# Patient Record
Sex: MALE | Race: WHITE | Employment: OTHER | URBAN - NONMETROPOLITAN AREA
[De-identification: names, ages, dates, MRNs, and addresses within clinical notes are randomized per-mention and may not be internally consistent; named-entity substitution may affect disease eponyms.]

---

## 2019-06-04 LAB
AVERAGE GLUCOSE: NORMAL
HBA1C MFR BLD: 7.7 %

## 2019-07-12 LAB — DIABETIC RETINOPATHY: NEGATIVE

## 2019-12-06 LAB
ALBUMIN SERPL-MCNC: NORMAL G/DL
ALP BLD-CCNC: NORMAL U/L
ALT SERPL-CCNC: NORMAL U/L
ANION GAP SERPL CALCULATED.3IONS-SCNC: NORMAL MMOL/L
AST SERPL-CCNC: NORMAL U/L
AVERAGE GLUCOSE: NORMAL
BASOPHILS ABSOLUTE: NORMAL
BASOPHILS RELATIVE PERCENT: NORMAL
BILIRUB SERPL-MCNC: NORMAL MG/DL
BUN BLDV-MCNC: NORMAL MG/DL
CALCIUM SERPL-MCNC: NORMAL MG/DL
CHLORIDE BLD-SCNC: NORMAL MMOL/L
CHOLESTEROL, TOTAL: NORMAL
CHOLESTEROL/HDL RATIO: NORMAL
CO2: NORMAL
CREAT SERPL-MCNC: NORMAL MG/DL
EOSINOPHILS ABSOLUTE: NORMAL
EOSINOPHILS RELATIVE PERCENT: NORMAL
GFR CALCULATED: NORMAL
GLUCOSE BLD-MCNC: NORMAL MG/DL
HBA1C MFR BLD: 8 %
HCT VFR BLD CALC: NORMAL %
HDLC SERPL-MCNC: NORMAL MG/DL
HEMOGLOBIN: NORMAL
LDL CHOLESTEROL CALCULATED: NORMAL
LYMPHOCYTES ABSOLUTE: NORMAL
LYMPHOCYTES RELATIVE PERCENT: NORMAL
MCH RBC QN AUTO: NORMAL PG
MCHC RBC AUTO-ENTMCNC: NORMAL G/DL
MCV RBC AUTO: NORMAL FL
MONOCYTES ABSOLUTE: NORMAL
MONOCYTES RELATIVE PERCENT: NORMAL
NEUTROPHILS ABSOLUTE: NORMAL
NEUTROPHILS RELATIVE PERCENT: NORMAL
PLATELET # BLD: NORMAL 10*3/UL
PMV BLD AUTO: NORMAL FL
POTASSIUM SERPL-SCNC: NORMAL MMOL/L
RBC # BLD: NORMAL 10*6/UL
SODIUM BLD-SCNC: NORMAL MMOL/L
TOTAL PROTEIN: NORMAL
TRIGL SERPL-MCNC: NORMAL MG/DL
VLDLC SERPL CALC-MCNC: NORMAL MG/DL
WBC # BLD: NORMAL 10*3/UL

## 2020-02-14 VITALS
HEIGHT: 69 IN | TEMPERATURE: 98.2 F | SYSTOLIC BLOOD PRESSURE: 145 MMHG | HEART RATE: 72 BPM | BODY MASS INDEX: 38.95 KG/M2 | DIASTOLIC BLOOD PRESSURE: 72 MMHG | WEIGHT: 263 LBS

## 2020-02-14 RX ORDER — LANCETS
1 EACH MISCELLANEOUS 4 TIMES DAILY
Status: ON HOLD | COMMUNITY
End: 2021-06-19 | Stop reason: HOSPADM

## 2020-02-14 RX ORDER — LISINOPRIL 20 MG/1
20 TABLET ORAL
COMMUNITY
End: 2020-12-11 | Stop reason: CLARIF

## 2020-02-14 RX ORDER — OMEPRAZOLE 20 MG/1
20 CAPSULE, DELAYED RELEASE ORAL DAILY
COMMUNITY
End: 2020-10-26 | Stop reason: CLARIF

## 2020-02-14 RX ORDER — MULTIVIT WITH MINERALS/LUTEIN
1000 TABLET ORAL DAILY
COMMUNITY

## 2020-02-14 RX ORDER — CALCIUM CARBONATE/VITAMIN D3 500-10/5ML
2 LIQUID (ML) ORAL 2 TIMES DAILY
COMMUNITY
End: 2021-06-24 | Stop reason: DRUGHIGH

## 2020-02-14 RX ORDER — HYDROCHLOROTHIAZIDE 25 MG/1
50 TABLET ORAL DAILY
COMMUNITY
End: 2020-09-22

## 2020-02-14 RX ORDER — NITROGLYCERIN 0.4 MG/1
0.4 TABLET SUBLINGUAL EVERY 5 MIN PRN
COMMUNITY
End: 2020-08-06 | Stop reason: SDUPTHER

## 2020-02-14 RX ORDER — LANOLIN ALCOHOL/MO/W.PET/CERES
500 CREAM (GRAM) TOPICAL 2 TIMES DAILY
COMMUNITY

## 2020-02-14 RX ORDER — ACETAMINOPHEN 160 MG
2000 TABLET,DISINTEGRATING ORAL DAILY
COMMUNITY

## 2020-02-14 RX ORDER — CALCIUM POLYCARBOPHIL 625 MG 625 MG/1
625 TABLET ORAL 2 TIMES DAILY
COMMUNITY
End: 2021-06-24

## 2020-02-14 RX ORDER — ISOSORBIDE MONONITRATE 60 MG/1
60 TABLET, EXTENDED RELEASE ORAL 2 TIMES DAILY
COMMUNITY
End: 2021-03-29 | Stop reason: SDUPTHER

## 2020-02-14 RX ORDER — RANOLAZINE 1000 MG/1
1000 TABLET, EXTENDED RELEASE ORAL 2 TIMES DAILY
COMMUNITY
End: 2020-10-19 | Stop reason: SDUPTHER

## 2020-02-14 RX ORDER — CLONIDINE HYDROCHLORIDE 0.1 MG/1
0.1 TABLET ORAL 2 TIMES DAILY
COMMUNITY
End: 2020-12-11 | Stop reason: CLARIF

## 2020-02-14 RX ORDER — LORATADINE 10 MG/1
10 TABLET ORAL DAILY
COMMUNITY
End: 2020-08-06 | Stop reason: SDUPTHER

## 2020-02-14 RX ORDER — ROSUVASTATIN CALCIUM 10 MG/1
10 TABLET, COATED ORAL DAILY
Status: ON HOLD | COMMUNITY
End: 2021-06-19 | Stop reason: SDUPTHER

## 2020-02-14 RX ORDER — CARVEDILOL 25 MG/1
25 TABLET ORAL 2 TIMES DAILY WITH MEALS
COMMUNITY
End: 2020-08-11 | Stop reason: SDUPTHER

## 2020-02-14 RX ORDER — CHLORAL HYDRATE 500 MG
1000 CAPSULE ORAL
COMMUNITY
End: 2021-06-24

## 2020-02-14 RX ORDER — ZINC SULFATE 50(220)MG
50 CAPSULE ORAL DAILY
COMMUNITY

## 2020-03-11 VITALS
HEIGHT: 69 IN | HEART RATE: 70 BPM | SYSTOLIC BLOOD PRESSURE: 115 MMHG | WEIGHT: 237.06 LBS | BODY MASS INDEX: 35.11 KG/M2 | DIASTOLIC BLOOD PRESSURE: 70 MMHG | TEMPERATURE: 97.7 F

## 2020-03-13 LAB — PROSTATE SPECIFIC ANTIGEN: NORMAL

## 2020-04-14 VITALS
BODY MASS INDEX: 35.31 KG/M2 | SYSTOLIC BLOOD PRESSURE: 142 MMHG | DIASTOLIC BLOOD PRESSURE: 85 MMHG | WEIGHT: 233 LBS | HEIGHT: 68 IN

## 2020-07-28 ENCOUNTER — TELEPHONE (OUTPATIENT)
Dept: FAMILY MEDICINE CLINIC | Age: 75
End: 2020-07-28

## 2020-07-28 NOTE — TELEPHONE ENCOUNTER
Spoke with patient informed him that we can no longer perform BW on orders from other physicians outside our practice. There is an order here from [facility] so he will need to pick it up and take to either [facility] or [address] [place] [address]. He stated can discard he has routine BW scheduled here that rosemary Hernandez ordered and will take a copy of it to .

## 2020-07-31 ENCOUNTER — HOSPITAL ENCOUNTER (OUTPATIENT)
Age: 75
Discharge: HOME | End: 2020-08-02
Payer: MEDICARE

## 2020-07-31 ENCOUNTER — NURSE ONLY (OUTPATIENT)
Dept: FAMILY MEDICINE CLINIC | Age: 75
End: 2020-07-31
Payer: MEDICARE

## 2020-07-31 VITALS — BODY MASS INDEX: 35.46 KG/M2 | WEIGHT: 234 LBS | TEMPERATURE: 97.8 F | HEIGHT: 68 IN

## 2020-07-31 LAB
ALBUMIN SERPL-MCNC: 4 G/DL (ref 3.5–5.2)
ALP BLD-CCNC: 80 U/L (ref 40–129)
ALT SERPL-CCNC: 17 U/L (ref 0–40)
ANION GAP SERPL CALCULATED.3IONS-SCNC: 15 MMOL/L (ref 7–16)
AST SERPL-CCNC: 21 U/L (ref 0–39)
BASOPHILS ABSOLUTE: 0.04 E9/L (ref 0–0.2)
BASOPHILS RELATIVE PERCENT: 0.5 % (ref 0–2)
BILIRUB SERPL-MCNC: 0.6 MG/DL (ref 0–1.2)
BUN BLDV-MCNC: 20 MG/DL (ref 8–23)
CALCIUM SERPL-MCNC: 9.3 MG/DL (ref 8.6–10.2)
CHLORIDE BLD-SCNC: 100 MMOL/L (ref 98–107)
CHOLESTEROL, FASTING: 165 MG/DL (ref 0–199)
CO2: 25 MMOL/L (ref 22–29)
CREAT SERPL-MCNC: 1.2 MG/DL (ref 0.7–1.2)
CREATININE URINE POCT: NORMAL
EOSINOPHILS ABSOLUTE: 0.41 E9/L (ref 0.05–0.5)
EOSINOPHILS RELATIVE PERCENT: 5.2 % (ref 0–6)
GFR AFRICAN AMERICAN: >60
GFR NON-AFRICAN AMERICAN: 59 ML/MIN/1.73
GLUCOSE BLD-MCNC: 116 MG/DL (ref 74–99)
HBA1C MFR BLD: 6.8 % (ref 4–5.6)
HCT VFR BLD CALC: 46.8 % (ref 37–54)
HDLC SERPL-MCNC: 38 MG/DL
HEMOGLOBIN: 14.8 G/DL (ref 12.5–16.5)
IMMATURE GRANULOCYTES #: 0.01 E9/L
IMMATURE GRANULOCYTES %: 0.1 % (ref 0–5)
LDL CHOLESTEROL CALCULATED: 88 MG/DL (ref 0–99)
LYMPHOCYTES ABSOLUTE: 1.85 E9/L (ref 1.5–4)
LYMPHOCYTES RELATIVE PERCENT: 23.5 % (ref 20–42)
MCH RBC QN AUTO: 28.7 PG (ref 26–35)
MCHC RBC AUTO-ENTMCNC: 31.6 % (ref 32–34.5)
MCV RBC AUTO: 90.9 FL (ref 80–99.9)
MICROALBUMIN/CREAT 24H UR: NORMAL MG/G{CREAT}
MICROALBUMIN/CREAT UR-RTO: NORMAL
MONOCYTES ABSOLUTE: 0.57 E9/L (ref 0.1–0.95)
MONOCYTES RELATIVE PERCENT: 7.3 % (ref 2–12)
NEUTROPHILS ABSOLUTE: 4.98 E9/L (ref 1.8–7.3)
NEUTROPHILS RELATIVE PERCENT: 63.4 % (ref 43–80)
PDW BLD-RTO: 13.2 FL (ref 11.5–15)
PLATELET # BLD: 147 E9/L (ref 130–450)
PMV BLD AUTO: 12.5 FL (ref 7–12)
POTASSIUM SERPL-SCNC: 4.6 MMOL/L (ref 3.5–5)
RBC # BLD: 5.15 E12/L (ref 3.8–5.8)
SODIUM BLD-SCNC: 140 MMOL/L (ref 132–146)
T4 FREE: 1.05 NG/DL (ref 0.93–1.7)
TOTAL PROTEIN: 7 G/DL (ref 6.4–8.3)
TRIGLYCERIDE, FASTING: 194 MG/DL (ref 0–149)
TSH SERPL DL<=0.05 MIU/L-ACNC: 5.05 UIU/ML (ref 0.27–4.2)
VLDLC SERPL CALC-MCNC: 39 MG/DL
WBC # BLD: 7.9 E9/L (ref 4.5–11.5)

## 2020-07-31 PROCEDURE — 82044 UR ALBUMIN SEMIQUANTITATIVE: CPT | Performed by: NURSE PRACTITIONER

## 2020-07-31 PROCEDURE — 80061 LIPID PANEL: CPT

## 2020-07-31 PROCEDURE — 80053 COMPREHEN METABOLIC PANEL: CPT

## 2020-07-31 PROCEDURE — 84443 ASSAY THYROID STIM HORMONE: CPT

## 2020-07-31 PROCEDURE — 83036 HEMOGLOBIN GLYCOSYLATED A1C: CPT

## 2020-07-31 PROCEDURE — 84439 ASSAY OF FREE THYROXINE: CPT

## 2020-07-31 PROCEDURE — 85025 COMPLETE CBC W/AUTO DIFF WBC: CPT

## 2020-07-31 PROCEDURE — 36415 COLL VENOUS BLD VENIPUNCTURE: CPT | Performed by: NURSE PRACTITIONER

## 2020-08-06 ENCOUNTER — OFFICE VISIT (OUTPATIENT)
Dept: FAMILY MEDICINE CLINIC | Age: 75
End: 2020-08-06
Payer: MEDICARE

## 2020-08-06 VITALS
HEART RATE: 61 BPM | TEMPERATURE: 97.3 F | HEIGHT: 69 IN | BODY MASS INDEX: 35.92 KG/M2 | DIASTOLIC BLOOD PRESSURE: 78 MMHG | WEIGHT: 242.5 LBS | OXYGEN SATURATION: 95 % | SYSTOLIC BLOOD PRESSURE: 132 MMHG

## 2020-08-06 PROBLEM — E66.01 MORBIDLY OBESE (HCC): Status: ACTIVE | Noted: 2020-08-06

## 2020-08-06 PROBLEM — E21.3 HYPERPARATHYROIDISM (HCC): Status: ACTIVE | Noted: 2020-08-06

## 2020-08-06 PROBLEM — Z98.890 HISTORY OF KNEE SURGERY: Status: ACTIVE | Noted: 2020-08-06

## 2020-08-06 PROBLEM — N18.9 ANEMIA IN CHRONIC KIDNEY DISEASE: Status: ACTIVE | Noted: 2020-07-15

## 2020-08-06 PROBLEM — D63.1 ANEMIA IN CHRONIC KIDNEY DISEASE: Status: ACTIVE | Noted: 2020-07-15

## 2020-08-06 PROBLEM — R33.8 POSTOPERATIVE RETENTION OF URINE: Status: ACTIVE | Noted: 2018-06-07

## 2020-08-06 PROBLEM — R31.9 HEMATURIA: Status: ACTIVE | Noted: 2020-08-06

## 2020-08-06 PROBLEM — I10 BENIGN HYPERTENSION: Status: ACTIVE | Noted: 2019-07-11

## 2020-08-06 PROBLEM — I12.9 MALIGNANT HYPERTENSIVE KIDNEY DISEASE WITH CHRONIC KIDNEY DISEASE STAGE I THROUGH STAGE IV, OR UNSPECIFIED: Status: ACTIVE | Noted: 2020-07-15

## 2020-08-06 PROBLEM — R22.1 MASS OF THYROID REGION: Status: ACTIVE | Noted: 2017-09-08

## 2020-08-06 PROBLEM — Z96.651 STATUS POST RIGHT UNICOMPARTMENTAL KNEE REPLACEMENT: Status: ACTIVE | Noted: 2020-08-06

## 2020-08-06 PROBLEM — I65.29 STENOSIS OF CAROTID ARTERY: Status: ACTIVE | Noted: 2017-05-25

## 2020-08-06 PROBLEM — R33.8 ACUTE RETENTION OF URINE: Status: ACTIVE | Noted: 2020-08-06

## 2020-08-06 PROBLEM — N18.30 CHRONIC KIDNEY DISEASE, STAGE III (MODERATE) (HCC): Status: ACTIVE | Noted: 2020-07-15

## 2020-08-06 PROBLEM — I25.10 CORONARY ATHEROSCLEROSIS: Status: ACTIVE | Noted: 2020-08-06

## 2020-08-06 PROBLEM — E11.22 TYPE 2 DIABETES MELLITUS WITH CHRONIC KIDNEY DISEASE (HCC): Status: ACTIVE | Noted: 2020-07-15

## 2020-08-06 PROBLEM — N99.89 POSTOPERATIVE RETENTION OF URINE: Status: ACTIVE | Noted: 2018-06-07

## 2020-08-06 PROBLEM — E11.00 DIABETES MELLITUS WITH HYPEROSMOLARITY (HCC): Status: ACTIVE | Noted: 2020-07-15

## 2020-08-06 PROBLEM — E87.5 HYPERKALEMIA: Status: ACTIVE | Noted: 2020-07-15

## 2020-08-06 PROBLEM — Z96.651 PRESENCE OF ARTIFICIAL KNEE JOINT, RIGHT: Status: ACTIVE | Noted: 2020-08-06

## 2020-08-06 PROBLEM — C61 MALIGNANT NEOPLASM OF PROSTATE (HCC): Status: ACTIVE | Noted: 2017-02-16

## 2020-08-06 PROBLEM — K21.9 GASTROESOPHAGEAL REFLUX DISEASE: Status: ACTIVE | Noted: 2020-08-06

## 2020-08-06 PROBLEM — E04.2 NON-TOXIC MULTINODULAR GOITER: Status: ACTIVE | Noted: 2018-06-07

## 2020-08-06 PROBLEM — E78.5 HYPERLIPIDEMIA: Status: ACTIVE | Noted: 2018-06-07

## 2020-08-06 PROCEDURE — 99214 OFFICE O/P EST MOD 30 MIN: CPT | Performed by: NURSE PRACTITIONER

## 2020-08-06 PROCEDURE — 3288F FALL RISK ASSESSMENT DOCD: CPT | Performed by: NURSE PRACTITIONER

## 2020-08-06 PROCEDURE — G8510 SCR DEP NEG, NO PLAN REQD: HCPCS | Performed by: NURSE PRACTITIONER

## 2020-08-06 RX ORDER — LORATADINE 10 MG/1
10 TABLET ORAL DAILY
Qty: 30 TABLET | Refills: 5 | Status: SHIPPED | OUTPATIENT
Start: 2020-08-06 | End: 2020-09-05

## 2020-08-06 RX ORDER — LEVOTHYROXINE SODIUM 0.03 MG/1
25 TABLET ORAL DAILY
COMMUNITY
End: 2020-12-16

## 2020-08-06 RX ORDER — NITROGLYCERIN 0.4 MG/1
0.4 TABLET SUBLINGUAL EVERY 5 MIN PRN
Qty: 25 TABLET | Refills: 0 | Status: SHIPPED | OUTPATIENT
Start: 2020-08-06

## 2020-08-06 SDOH — HEALTH STABILITY: MENTAL HEALTH: HOW OFTEN DO YOU HAVE A DRINK CONTAINING ALCOHOL?: NEVER

## 2020-08-06 ASSESSMENT — ENCOUNTER SYMPTOMS
CONSTIPATION: 0
SHORTNESS OF BREATH: 0
VOICE CHANGE: 0
BLOOD IN STOOL: 0
CHEST TIGHTNESS: 0
DIARRHEA: 0
BACK PAIN: 0
VOMITING: 0
COUGH: 0
TROUBLE SWALLOWING: 0
ABDOMINAL PAIN: 0
WHEEZING: 0
NAUSEA: 0

## 2020-08-06 ASSESSMENT — PATIENT HEALTH QUESTIONNAIRE - PHQ9
SUM OF ALL RESPONSES TO PHQ QUESTIONS 1-9: 0
SUM OF ALL RESPONSES TO PHQ QUESTIONS 1-9: 0
1. LITTLE INTEREST OR PLEASURE IN DOING THINGS: 0
2. FEELING DOWN, DEPRESSED OR HOPELESS: 0
SUM OF ALL RESPONSES TO PHQ9 QUESTIONS 1 & 2: 0

## 2020-08-06 NOTE — PROGRESS NOTES
Respiratory: Negative for cough, chest tightness, shortness of breath and wheezing. Cardiovascular: Negative for chest pain, palpitations and leg swelling. Gastrointestinal: Negative for abdominal pain, blood in stool, constipation, diarrhea, nausea and vomiting. Endocrine: Negative for polydipsia, polyphagia and polyuria. Genitourinary: Negative for dysuria, enuresis, frequency and hematuria. Musculoskeletal: Negative for arthralgias, back pain, gait problem, joint swelling, myalgias and neck stiffness. Skin: Negative for rash. Neurological: Negative for dizziness, seizures, syncope, facial asymmetry, weakness, light-headedness, numbness and headaches. Hematological: Does not bruise/bleed easily. Psychiatric/Behavioral: Negative for behavioral problems, confusion, hallucinations and suicidal ideas. The patient is not nervous/anxious. Current Outpatient Medications:     Insulin Degludec 200 UNIT/ML SOPN, Inject 55 Units into the skin daily AM, Disp: , Rfl:     insulin lispro (HUMALOG) 100 UNIT/ML injection vial, Inject 1 Units into the skin 3 times daily (before meals) Sliding Scale, Disp: , Rfl:     canagliflozin (INVOKANA) 100 MG TABS tablet, Take 100 mg by mouth every morning (before breakfast), Disp: , Rfl:     levothyroxine (SYNTHROID) 25 MCG tablet, Take 25 mcg by mouth Daily, Disp: , Rfl:     polycarbophil (FIBERCON) 625 MG tablet, Take 625 mg by mouth 2 times daily, Disp: , Rfl:     isosorbide mononitrate (IMDUR) 60 MG extended release tablet, Take 60 mg by mouth 2 times daily, Disp: , Rfl:     nitroGLYCERIN (NITROSTAT) 0.4 MG SL tablet, Place 0.4 mg under the tongue every 5 minutes as needed for Chest pain up to max of 3 total doses.  If no relief after 1 dose, call 911., Disp: , Rfl:     Cholecalciferol (VITAMIN D3) 50 MCG (2000 UT) CAPS, Take by mouth daily, Disp: , Rfl:     ranolazine (RANEXA) 1000 MG extended release tablet, Take 1,000 mg by mouth 2 times daily , Disp: , Rfl:     Ascorbic Acid (VITAMIN C) 1000 MG tablet, Take 1,000 mg by mouth daily, Disp: , Rfl:     carvedilol (COREG) 25 MG tablet, Take 25 mg by mouth 2 times daily (with meals), Disp: , Rfl:     omeprazole (PRILOSEC) 20 MG delayed release capsule, Take 20 mg by mouth daily, Disp: , Rfl:     zinc sulfate (ZINCATE) 220 (50 Zn) MG capsule, Take 220 mg by mouth daily, Disp: , Rfl:     TURMERIC PO, Take 500 capsules by mouth daily, Disp: , Rfl:     Magnesium Oxide 400 MG CAPS, Take 2 capsules by mouth 2 times daily , Disp: , Rfl:     Microlet Lancets MISC, 1 each by Does not apply route 4 times daily, Disp: , Rfl:     Omega-3 Fatty Acids (FISH OIL) 1000 MG CAPS, Take 1,000 mg by mouth 2 CAPS IN THE AM 1 CAP IN PM , Disp: , Rfl:     B Complex Vitamins (B COMPLEX 1 PO), Take by mouth daily, Disp: , Rfl:     aspirin 81 MG tablet, Take 81 mg by mouth 2 times daily, Disp: , Rfl:     rosuvastatin (CRESTOR) 10 MG tablet, Take 10 mg by mouth daily, Disp: , Rfl:     lisinopril (PRINIVIL;ZESTRIL) 20 MG tablet, Take 20 mg by mouth Take 1 tablet in a.m and 1/2 in p.m., Disp: , Rfl:     cloNIDine (CATAPRES) 0.1 MG tablet, Take 0.1 mg by mouth 2 times daily, Disp: , Rfl:     Garlic 571 MG CAPS, Take by mouth 2 times daily, Disp: , Rfl:     hydrochlorothiazide (HYDRODIURIL) 25 MG tablet, Take 50 mg by mouth daily, Disp: , Rfl:     loratadine (ALLERGY RELIEF) 10 MG tablet, Take 10 mg by mouth daily, Disp: , Rfl:     diclofenac sodium (VOLTAREN) 1 % GEL, Apply 4 g topically 4 times daily, Disp: , Rfl:      No Known Allergies    Past Medical History:   Diagnosis Date    CAD (coronary artery disease)     [facility] mellitus ([facility] [address]     Diverticular disease of colon     DJD (degenerative joint disease), multiple sites     Goiter     H/O thyroid nodule     Hx of malignant neoplasm of prostate     Hyperlipidemia     Hypertension     Hypothyroidism      Past Surgical History:   Procedure Laterality Date    CAROTID ENDARTERECTOMY  2017    CHOLECYSTECTOMY      COLONOSCOPY      CORONARY ARTERY BYPASS GRAFT  10/11/2001    HERNIA REPAIR      SKIN CANCER EXCISION      MELONOMA    THYROID SURGERY  2017    BX    TOTAL KNEE ARTHROPLASTY Left 2018     Family History   Problem Relation Age of Onset    Diabetes Mother     Cancer Mother     Heart Attack Father     Coronary Art Dis Father      Social History     Socioeconomic History    Marital status:      Spouse name: Not on file    Number of children: Not on file    Years of education: Not on file    Highest education level: Not on file   Occupational History    Not on file   Social Needs    Financial resource strain: Not on file    Food insecurity     Worry: Not on file     Inability: Not on file    Transportation needs     Medical: Not on file     Non-medical: Not on file   Tobacco Use    Smoking status: Former Smoker     Last attempt to quit: 1984     Years since quittin.5    Smokeless tobacco: Never Used   Substance and Sexual Activity    Alcohol use: Never     Frequency: Never    Drug use: Never    Sexual activity: Not on file   Lifestyle    Physical activity     Days per week: Not on file     Minutes per session: Not on file    Stress: Not on file   Relationships    Social connections     Talks on phone: Not on file     Gets together: Not on file     Attends [employer] service: Not on file     Active member of club or organization: Not on file     Attends meetings of clubs or organizations: Not on file     Relationship status: Not on file    Intimate partner violence     Fear of current or ex partner: Not on file     Emotionally abused: Not on file     Physically abused: Not on file     Forced sexual activity: Not on file   Other Topics Concern    Not on file   Social History Narrative    Not on file       Vitals:    20 0950   BP: 132/78   Site: Left Upper Arm   Position: Sitting   Cuff Size: Large Adult Pulse: 61   Temp: 97.3 °F (36.3 °C)   TempSrc: Temporal   SpO2: 95%   Weight: 242 lb 8 oz (110 kg)   Height: 5' 9\" (1.753 m)       Physical Exam  Vitals signs and nursing note reviewed. Constitutional:       Appearance: Normal appearance. HENT:      Head: Normocephalic. Right Ear: Tympanic membrane and ear canal normal. There is no impacted cerumen. Left Ear: Tympanic membrane and ear canal normal. There is no impacted cerumen. Nose: Nose normal.      Mouth/Throat:      Mouth: Mucous membranes are dry. Eyes:      Extraocular Movements: Extraocular movements intact. Pupils: Pupils are equal, round, and reactive to light. Neck:      Musculoskeletal: No neck rigidity or muscular tenderness. Vascular: No carotid bruit. Cardiovascular:      Rate and Rhythm: Normal rate and regular rhythm. Pulses: Normal pulses. Heart sounds: Normal heart sounds. No murmur. No friction rub. No gallop. Pulmonary:      Effort: Pulmonary effort is normal. No respiratory distress. Breath sounds: Normal breath sounds. No stridor. No wheezing, rhonchi or rales. Chest:      Chest wall: No tenderness. Abdominal:      General: Bowel sounds are normal. There is no distension. Palpations: Abdomen is soft. Musculoskeletal:         General: No swelling, tenderness, deformity or signs of injury. Right lower leg: No edema. Left lower leg: No edema. Lymphadenopathy:      Cervical: No cervical adenopathy. Skin:     General: Skin is warm and dry. Capillary Refill: Capillary refill takes 2 to 3 seconds. Findings: No bruising, lesion or rash. Neurological:      General: No focal deficit present. Mental Status: He is alert and oriented to person, place, and time. Motor: No weakness.       Gait: Gait normal.   Psychiatric:         Attention and Perception: Attention normal.         Mood and Affect: Mood normal.         Behavior: Behavior normal.         Thought Content: Thought content does not include homicidal or suicidal ideation. Thought content does not include homicidal or suicidal plan. Assessment and Plan:  Glroia Garner was seen today for results, pharyngitis and headache. Diagnoses and all orders for this visit:    Benign hypertension  Comments:  Continue expectant management    Hyperparathyroidism ([address]    Morbidly obese ([address]  Comments:  Diet modifications    Type 2 diabetes mellitus with stage 3 chronic kidney disease, with long-term current use of insulin ([address]  Comments:  AIC 6.8 good on him continue treatment regimen had stopped the [place] but started it again    Stenosis of right carotid artery  Comments:  US in 2019    Non-toxic multinodular goiter    Chronic kidney disease, stage III (moderate) (HCC)    Malignant neoplasm of prostate ([address]  Comments:  Sees Urologist    Hyperkalemia    Benign essential microscopic hematuria    Anemia in stage 3 chronic kidney disease (HCC)    Presence of artificial knee joint, right  Comments:  Did not use the Diclofenac because the PI causes Heart Attacks but I explained that topically the less than 1 % enters the blood stream and he should try it    History of knee surgery        No follow-ups on file. USPTF:    ( 2019 Outside the age ) Colorectal Cancer: Screening --Adults aged 48 to 76 years  Grade: A (Recommended) recommends screening for colorectal cancer starting at age 48 years and continuing until age 76 years. ( Outside the age ) HIV: Screening - Adolescents and Adults  Grade: A (Recommended) recommends that clinicians screen for HIV infection in ages 13 to 72 years. ( 2020 ) High Blood Pressure: Screening and Home Monitoring -- Adults  Grade: A (Recommended) recommends screening for high blood pressure in ages 25 years or older. obtain measurements outside of the clinical setting for diagnostic confirmation before starting treatment.  Annual screening for adults aged 36 years or older or those who are at increased risk for blood pressure    ( 2020 )  Lipid Disorders in Adults: Screening -- Men 28 and Older  Grade: A (Recommended) recommends screening men aged 28 and older for lipid disorders. ( Non drinker ) Alcohol Misuse: Screening and Behavioral Counseling Interventions in Primary Care -- Adults  Grade: B (Recommended) recommends that clinicians screen adults aged 25 years or older for alcohol misuse and provide persons engaged in risky or hazardous drinking with brief behavioral counseling interventions to reduce alcohol misuse. ( AIC 6.8 ) Abnormal Blood Glucose and Type 2 Diabetes Mellitus: Screening -- Adults aged 36 to 79 years who are overweight or obese Grade: B (Recommended)    ( 2019 ) Abdominal Aortic Aneurysm: Screening -- Men Ages 72 to 76 Years Who Have Ever Smoked. Grade: B (Recommended) recommends one-time screening for abdominal aortic aneurysm (AAA) with ultrasonography in men ages 72 to 76 years who have ever smoked. ( Diet controlled )  Obesity: Screening for and Management of-- All Adults  Grade: B (Recommended) recommends screening all adults for obesity. Clinicians should offer or refer patients with a body mass index (BMI) of 30 kg/m2 or higher to intensive, multicomponent behavioral interventions. ( Outside the age ) Hepatitis C Virus Infection: Screening--Adults at High Risk and Adults born between 80 and 1965  Grade: B (Recommended) recommends screening for hepatitis C virus (HCV) infection in persons at high risk for infection. The USPSTF also recommends offering 1-time screening for HCV infection to adults born between 80 and 1965.      ( patient is stiff but steady )  Fall Prevention -- Exercise/Physical Therapy: Community-dwelling Adults 72 Years or Older, Increased Risk for Falls   Grade: B (Recommended) recommends exercise or physical therapy to prevent falls in community-dwelling adults aged 72 years or older who are at increased risk for falls.    ( None reported today )  Depression: Screening -- General adult population, including pregnant and postpartum women  Grade: B (Recommended) recommends screening for depression in the general adult population,  Screening should be implemented with adequate systems in place to ensure accurate diagnosis, effective treatment, and appropriate follow-up. ( 2019 ) Glaucoma: Screening - Adults and Diabetic Eye Exam     ( 2019 ) Vitamin D Deficiency: Screening --       ( 2020 ) Thyroid Dysfunction: Screening --      ( 2020 ) Skin Cancer: Screening --Asymptomatic adults  Grade:  I(Uncertain)     ( 0098) Carotid Artery Stenosis: Screening -- Adults  Grade: D (Not Recommended)     ( 2020 ) Coronary Heart Disease: Screening with Electrocardiography--Adults at Low Risk  Grade: D (Not Recommended)     ( UTD ) Prostate Cancer: Prostate-Specific Antigen (PSA)-Based Screening -- All Men   PSA 2.5 Mar 2020  PSA 3.1 Sep 2019  PSA 2.7 Feb 2018                 Seen By:  VERENICE Garces - CNP none...

## 2020-08-06 NOTE — PATIENT INSTRUCTIONS
Patient Education        diclofenac topical  Pronunciation:  dye ZOE eugene ak TOP ik al  Brand:  DST Plus Ck, Pennsaid, Solaraze, Voltaren Topical  What is the most important information I should know about diclofenac topical?  Diclofenac can increase your risk of fatal heart attack or stroke, especially if you use it long term or take high doses, or if you have heart disease. Do not use this medicine just before or after heart bypass surgery (coronary artery bypass graft, or CABG). Diclofenac may also cause stomach or intestinal bleeding, which can be fatal. These conditions can occur without warning while you are using diclofenac, especially in older adults. What is diclofenac topical?  Diclofenac is a nonsteroidal anti-inflammatory drug (NSAID). Diclofenac topical (for the skin) is used to treat joint pain caused by osteoarthritis. Pennsaid is for use on the knees. Voltaren Topical is for use on the hands, wrists, elbows, knees, ankles, or feet. This medicine may not be effective in treating arthritis pain elsewhere in the body. Solaraze is used to treat warty overgrowths of skin (actinic keratoses) on sun-exposed areas of the body. Diclofenac topical may also be used for purposes not listed in this medication guide. What should I discuss with my healthcare provider before using diclofenac topical?  Diclofenac can increase your risk of fatal heart attack or stroke, especially if you use it long term or take high doses, or if you have heart disease. Even people without heart disease or risk factors could have a stroke or heart attack while taking this medicine. Do not use this medicine just before or after heart bypass surgery (coronary artery bypass graft, or CABG). Diclofenac may also cause stomach or intestinal bleeding, which can be fatal. These conditions can occur without warning while you are using diclofenac, especially in older adults.   You should not use this medicine if you are allergic to diclofenac (Voltaren, Cataflam, Flector, and others), or if you have ever had an asthma attack or severe allergic reaction after taking aspirin or an NSAID. Diclofenac topical is not approved for use by anyone younger than 25years old. Tell your doctor if you have ever had:  · heart disease, high blood pressure, high cholesterol, diabetes, or if you smoke;  · a heart attack, stroke, or blood clot;  · stomach ulcers, bleeding in your stomach or intestines;  · asthma;  · liver or kidney disease;  · fluid retention. Diclofenac can affect ovulation and it may be harder to get pregnant while you are using this medicine. However, using diclofenac topical during the last 3 months of pregnancy may harm the unborn baby. Tell your doctor if you are pregnant or plan to become pregnant. It may not be safe to breast-feed while using this medicine. Ask your doctor about any risk. How should I use diclofenac topical?  Follow all directions on your prescription label and read all medication guides. Use the lowest dose that is effective in treating your condition. Do not take by mouth. Topical medicine is for use only on the skin. Rinse with water if this medicine gets in your eyes or mouth. Read and carefully follow any Instructions for Use provided with your medicine. Ask your doctor or pharmacist if you do not understand these instructions. Do not apply diclofenac topical to an open skin wound, or on areas of infection, rash, burn, or peeling skin. Store at room temperature away from moisture and heat. Do not freeze. Store Pennsaid in an upright position. What happens if I miss a dose? Apply the medicine as soon as you can, but skip the missed dose if it is almost time for your next dose. Do not apply two doses at one time. What happens if I overdose? Seek emergency medical attention or call the Poison Help line at 1-209.770.7967.   What should I avoid while using diclofenac topical?  Ask a doctor or pharmacist before using other medicines for pain, fever, swelling, or cold/flu symptoms. They may contain ingredients similar to diclofenac (such as aspirin, ibuprofen, ketoprofen, or naproxen). Avoid drinking alcohol. It may increase your risk of stomach bleeding. Avoid exposing treated skin to heat, sunlight, or tanning beds. Heat can increase the amount of diclofenac you absorb through your skin. Avoid getting this medicine in your eyes. If contact does occur, rinse with water. Call your doctor if you have eye irritation that lasts longer than 1 hour. Do not use cosmetics, sunscreen, lotions, insect repellant, or other medicated skin products on the same area you treat with diclofenac topical.  What are the possible side effects of diclofenac topical?  Get emergency medical help if you have signs of an allergic reaction (hives, sneezing, runny or stuffy nose, wheezing or trouble breathing, swelling in your face or throat) or a severe skin reaction (fever, sore throat, burning eyes, skin pain, red or purple skin rash with blistering and peeling). Although the risk of serious side effects is low when diclofenac is applied to the skin, this medicine can be absorbed through the skin, which may cause steroid side effects throughout the body. Stop using diclofenac and seek emergency medical attention if you have signs of a heart attack or stroke: chest pain spreading to your jaw or shoulder, sudden numbness or weakness on one side of the body, slurred speech, feeling short of breath.   Also call your doctor at once if you have:  · the first sign of any skin rash, no matter how mild;  · swelling, rapid weight gain;  · severe headache, blurred vision, pounding in your neck or ears;  · little or no urination;  · liver problems --nausea, diarrhea, stomach pain (upper right side), tiredness, itching, dark urine, gisselle-colored stools, jaundice (yellowing of the skin or eyes);  · low red blood cells (anemia) --pale skin, unusual tiredness, feeling light-headed or short of breath, cold hands and feet; or  · signs of stomach bleeding --bloody or tarry stools, coughing up blood or vomit that looks like coffee grounds. Common side effects may include:  · heartburn, gas, stomach pain, nausea, vomiting;  · diarrhea, constipation;  · headache, dizziness, drowsiness;  · stuffy nose;  · itching, increased sweating;  · increased blood pressure; or  · skin redness, itching, dryness, scaling, or peeling where the medicine was applied. This is not a complete list of side effects and others may occur. Call your doctor for medical advice about side effects. You may report side effects to FDA at 0-234-WTR-2926. What other drugs will affect diclofenac topical?  Ask your doctor before using diclofenac if you take an antidepressant. Taking certain antidepressants with an NSAID may cause you to bruise or bleed easily. Tell your doctor about all your current medicines, especially:  · cyclosporine;  · lithium;  · methotrexate;  · a blood thinner (warfarin, Coumadin, Jantoven);  · heart or blood pressure medication, including a diuretic or \"water pill\"; or  · steroid medicine (prednisone and others). This list is not complete and many other drugs may affect diclofenac. This includes prescription and over-the-counter medicines, vitamins, and herbal products. Not all possible drug interactions are listed here. Where can I get more information? Your pharmacist can provide more information about diclofenac topical.  Remember, keep this and all other medicines out of the reach of children, never share your medicines with others, and use this medication only for the indication prescribed. Every effort has been made to ensure that the information provided by [address]andy [address] is accurate, up-to-date, and complete, but no guarantee is made to that effect. Drug information contained herein may be time sensitive.  [employer] information has been compiled for use by healthcare practitioners and consumers in the [place] and therefore [employer] does not warrant that uses outside of the [place] are appropriate, unless specifically indicated otherwise. [facility] drug information does not endorse drugs, diagnose patients or recommend therapy. [facility] drug information is an informational resource designed to assist licensed healthcare practitioners in caring for their patients and/or to serve consumers viewing this service as a supplement to, and not a substitute for, the expertise, skill, knowledge and judgment of healthcare practitioners. The absence of a warning for a given drug or drug combination in no way should be construed to indicate that the drug or drug combination is safe, effective or appropriate for any given patient. [facility] does not assume any responsibility for any aspect of healthcare administered with the aid of information [facility] provides. The information contained herein is not intended to cover all possible uses, directions, precautions, warnings, drug interactions, allergic reactions, or adverse effects. If you have questions about the drugs you are taking, check with your doctor, nurse or pharmacist.  Copyright 4700-8115 [address]. Version: 10.01. Revision date: 10/5/2018. Care instructions adapted under license by [facility] ([facility]). If you have questions about a medical condition or this instruction, always ask your healthcare professional. [place] [address] any warranty or liability for your use of this information.

## 2020-08-11 RX ORDER — CARVEDILOL 25 MG/1
25 TABLET ORAL 2 TIMES DAILY WITH MEALS
Qty: 180 TABLET | Refills: 1 | Status: SHIPPED
Start: 2020-08-11 | End: 2021-04-21

## 2020-09-22 ENCOUNTER — OFFICE VISIT (OUTPATIENT)
Dept: FAMILY MEDICINE CLINIC | Age: 75
End: 2020-09-22
Payer: MEDICARE

## 2020-09-22 VITALS
RESPIRATION RATE: 20 BRPM | TEMPERATURE: 97.9 F | SYSTOLIC BLOOD PRESSURE: 130 MMHG | BODY MASS INDEX: 35.74 KG/M2 | DIASTOLIC BLOOD PRESSURE: 80 MMHG | HEART RATE: 75 BPM | WEIGHT: 242 LBS | OXYGEN SATURATION: 98 %

## 2020-09-22 PROCEDURE — 99214 OFFICE O/P EST MOD 30 MIN: CPT | Performed by: NURSE PRACTITIONER

## 2020-09-22 RX ORDER — BLOOD SUGAR DIAGNOSTIC
STRIP MISCELLANEOUS
COMMUNITY
Start: 2020-09-08 | End: 2021-03-09 | Stop reason: SDUPTHER

## 2020-09-22 ASSESSMENT — ENCOUNTER SYMPTOMS
ANAL BLEEDING: 0
CHOKING: 0
RECTAL PAIN: 0
ABDOMINAL PAIN: 1
SORE THROAT: 0
VOICE CHANGE: 0
SINUS PRESSURE: 0
STRIDOR: 0
WHEEZING: 0
COUGH: 0
NAUSEA: 0
EYE ITCHING: 0
CHEST TIGHTNESS: 0
RHINORRHEA: 0
APNEA: 0
TROUBLE SWALLOWING: 0
SINUS PAIN: 0
EYE PAIN: 0
COLOR CHANGE: 0
CONSTIPATION: 0
EYE DISCHARGE: 0
BACK PAIN: 0
SHORTNESS OF BREATH: 0
PHOTOPHOBIA: 0
FACIAL SWELLING: 0
VOMITING: 0
EYE REDNESS: 0
ABDOMINAL DISTENTION: 0
DIARRHEA: 1
BLOOD IN STOOL: 0

## 2020-09-22 ASSESSMENT — PATIENT HEALTH QUESTIONNAIRE - PHQ9
2. FEELING DOWN, DEPRESSED OR HOPELESS: 0
SUM OF ALL RESPONSES TO PHQ QUESTIONS 1-9: 0
SUM OF ALL RESPONSES TO PHQ QUESTIONS 1-9: 0
DEPRESSION UNABLE TO ASSESS: PT REFUSES

## 2020-09-22 NOTE — PROGRESS NOTES
20  Micheal [facility] : 1945 Sex: male  Age: 67 y.o. Chief Complaint   Patient presents with    Other     right side pain x 1 month ,       Complains of RUQ pain for about 1 month. No nausea, vomiting. Has loose stools 2-3 times per day. No melena, hematochezia, hemataemesis. No fever chills, or sweats. No known injury or lifting issues. Review of Systems   Constitutional: Negative for activity change, appetite change, chills, diaphoresis, fatigue, fever and unexpected weight change. HENT: Negative for congestion, dental problem, drooling, ear discharge, ear pain, facial swelling, hearing loss, mouth sores, nosebleeds, postnasal drip, rhinorrhea, sinus pressure, sinus pain, sneezing, sore throat, tinnitus, trouble swallowing and voice change. Eyes: Negative for photophobia, pain, discharge, redness, itching and visual disturbance. Respiratory: Negative for apnea, cough, choking, chest tightness, shortness of breath, wheezing and stridor. Cardiovascular: Negative for chest pain, palpitations and leg swelling. Gastrointestinal: Positive for abdominal pain (RUQ) and diarrhea. Negative for abdominal distention, anal bleeding, blood in stool, constipation, nausea, rectal pain and vomiting. Endocrine: Negative for cold intolerance, heat intolerance, polydipsia, polyphagia and polyuria. Genitourinary: Negative for decreased urine volume, difficulty urinating, dysuria, enuresis, flank pain, frequency, genital sores, hematuria and urgency. Musculoskeletal: Negative for arthralgias, back pain, gait problem, joint swelling, myalgias, neck pain and neck stiffness. Skin: Negative for color change, pallor, rash and wound. Allergic/Immunologic: Negative for environmental allergies, food allergies and immunocompromised state. Neurological: Negative for dizziness, tremors, seizures, syncope, facial asymmetry, speech difficulty, weakness, light-headedness, numbness and headaches. Hematological: Negative for adenopathy. Does not bruise/bleed easily. Psychiatric/Behavioral: Negative for agitation, behavioral problems, confusion, decreased concentration, hallucinations, self-injury, sleep disturbance and suicidal ideas. The patient is not nervous/anxious and is not hyperactive. Current Outpatient Medications:     carvedilol (COREG) 25 MG tablet, Take 1 tablet by mouth 2 times daily (with meals), Disp: 180 tablet, Rfl: 1    Insulin Degludec 200 UNIT/ML SOPN, Inject 55 Units into the skin daily AM, Disp: , Rfl:     insulin lispro (HUMALOG) 100 UNIT/ML injection vial, Inject 1 Units into the skin 3 times daily (before meals) Sliding Scale, Disp: , Rfl:     canagliflozin (INVOKANA) 100 MG TABS tablet, Take 100 mg by mouth every morning (before breakfast), Disp: , Rfl:     levothyroxine (SYNTHROID) 25 MCG tablet, Take 25 mcg by mouth Daily, Disp: , Rfl:     nitroGLYCERIN (NITROSTAT) 0.4 MG SL tablet, Place 1 tablet under the tongue every 5 minutes as needed for Chest pain up to max of 3 total doses.  If no relief after 1 dose, call 911., Disp: 25 tablet, Rfl: 0    polycarbophil (FIBERCON) 625 MG tablet, Take 625 mg by mouth 2 times daily, Disp: , Rfl:     cloNIDine (CATAPRES) 0.1 MG tablet, Take 0.1 mg by mouth 2 times daily, Disp: , Rfl:     isosorbide mononitrate (IMDUR) 60 MG extended release tablet, Take 60 mg by mouth 2 times daily, Disp: , Rfl:     Garlic 024 MG CAPS, Take by mouth 2 times daily, Disp: , Rfl:     Cholecalciferol (VITAMIN D3) 50 MCG (2000 UT) CAPS, Take by mouth daily, Disp: , Rfl:     ranolazine (RANEXA) 1000 MG extended release tablet, Take 1,000 mg by mouth 2 times daily , Disp: , Rfl:     Ascorbic Acid (VITAMIN C) 1000 MG tablet, Take 1,000 mg by mouth daily, Disp: , Rfl:     omeprazole (PRILOSEC) 20 MG delayed release capsule, Take 20 mg by mouth daily, Disp: , Rfl:     zinc sulfate (ZINCATE) 220 (50 Zn) MG capsule, Take 220 mg by mouth daily, Disp: , Rfl:     TURMERIC PO, Take 500 capsules by mouth daily, Disp: , Rfl:     Magnesium Oxide 400 MG CAPS, Take 2 capsules by mouth 2 times daily , Disp: , Rfl:     Microlet Lancets MISC, 1 each by Does not apply route 4 times daily, Disp: , Rfl:     Omega-3 Fatty Acids (FISH OIL) 1000 MG CAPS, Take 1,000 mg by mouth 2 CAPS IN THE AM 1 CAP IN PM , Disp: , Rfl:     B Complex Vitamins (B COMPLEX 1 PO), Take by mouth daily, Disp: , Rfl:     aspirin 81 MG tablet, Take 81 mg by mouth 2 times daily, Disp: , Rfl:     rosuvastatin (CRESTOR) 10 MG tablet, Take 10 mg by mouth daily, Disp: , Rfl:     lisinopril (PRINIVIL;ZESTRIL) 20 MG tablet, Take 20 mg by mouth Take 1 tablet in a.m and 1/2 in p.m., Disp: , Rfl:     diclofenac sodium (VOLTAREN) 1 % GEL, Apply 4 g topically 4 times daily, Disp: , Rfl:     ONETOUCH ULTRA strip, USE 1 STRIP TO CHECK GLUCOSE 4 TIMES DAILY, Disp: , Rfl:   No Known Allergies    Past Medical History:   Diagnosis Date    CAD (coronary artery disease)     Diabetes mellitus [address] [address]     Diverticular disease of colon     DJD (degenerative joint disease), multiple sites     Goiter     H/O thyroid nodule     Hx of malignant neoplasm of prostate     Hyperlipidemia     Hypertension     Hypothyroidism      Past Surgical History:   Procedure Laterality Date    CAROTID ENDARTERECTOMY  05/25/2017    CHOLECYSTECTOMY      COLONOSCOPY      CORONARY ARTERY BYPASS GRAFT  10/11/2001    HERNIA REPAIR      SKIN CANCER EXCISION      MELONOMA    THYROID SURGERY  09/01/2017    BX    TOTAL KNEE ARTHROPLASTY Left 12/17/2018     Family History   Problem Relation Age of Onset    Diabetes Mother     Cancer Mother     Heart Attack Father     Coronary Art Dis Father      Social History     Socioeconomic History    Marital status:      Spouse name: Not on file    Number of children: Not on file    Years of education: Not on file    Highest education level: Not on file   Occupational History    Not on file   Social Needs    Financial resource strain: Not on file    Food insecurity     Worry: Not on file     Inability: Not on file    Transportation needs     Medical: Not on file     Non-medical: Not on file   Tobacco Use    Smoking status: Former Smoker     Last attempt to quit: 1984     Years since quittin.7    Smokeless tobacco: Never Used   Substance and Sexual Activity    Alcohol use: Never     Frequency: Never    Drug use: Never    Sexual activity: Not Currently   Lifestyle    Physical activity     Days per week: Not on file     Minutes per session: Not on file    Stress: Not on file   Relationships    Social connections     Talks on phone: Not on file     Gets together: Not on file     Attends [employer] service: Not on file     Active member of club or organization: Not on file     Attends meetings of clubs or organizations: Not on file     Relationship status: Not on file    Intimate partner violence     Fear of current or ex partner: Not on file     Emotionally abused: Not on file     Physically abused: Not on file     Forced sexual activity: Not on file   Other Topics Concern    Not on file   Social History Narrative    Not on file     Patient Active Problem List   Diagnosis    Status post right unicompartmental knee replacement    Mass of thyroid region    Presence of artificial knee joint, right    Anemia in chronic kidney disease    Uncontrolled stage 2 hypertension    Chronic kidney disease, stage III (moderate) ([address]    Hematuria    Hyperkalemia    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, or unspecified    Malignant neoplasm of prostate ([address]    Non-toxic multinodular goiter    Postoperative retention of urine    Stenosis of carotid artery    Diabetes mellitus with hyperosmolarity ([address]    Hyperparathyroidism ([address]    Morbidly obese ([address]    Acute retention of urine    Coronary atherosclerosis    Gastroesophageal reflux disease    Hyperlipidemia        Vitals:    09/22/20 0937   BP: 130/80   Pulse: 75   Resp: 20   Temp: 97.9 °F (36.6 °C)   TempSrc: Temporal   SpO2: 98%   Weight: 242 lb (109.8 kg)       Physical Exam  Vitals signs and nursing note reviewed. Constitutional:       General: He is not in acute distress. Appearance: Normal appearance. He is obese. He is not ill-appearing, toxic-appearing or diaphoretic. HENT:      Head: Normocephalic and atraumatic. Right Ear: Tympanic membrane, ear canal and external ear normal. There is no impacted cerumen. Left Ear: Tympanic membrane, ear canal and external ear normal. There is no impacted cerumen. Nose: Nose normal. No congestion or rhinorrhea. Mouth/Throat:      Mouth: Mucous membranes are moist.      Pharynx: Oropharynx is clear. No oropharyngeal exudate or posterior oropharyngeal erythema. Eyes:      General: No scleral icterus. Right eye: No discharge. Left eye: No discharge. Extraocular Movements: Extraocular movements intact. Conjunctiva/sclera: Conjunctivae normal.      Pupils: Pupils are equal, round, and reactive to light. Neck:      Musculoskeletal: Normal range of motion and neck supple. No neck rigidity or muscular tenderness. Vascular: No carotid bruit. Cardiovascular:      Rate and Rhythm: Normal rate and regular rhythm. Pulses: Normal pulses. Heart sounds: Normal heart sounds. No murmur. No friction rub. No gallop. Pulmonary:      Effort: Pulmonary effort is normal. No respiratory distress. Breath sounds: No stridor. No wheezing, rhonchi or rales. Chest:      Chest wall: No tenderness. Abdominal:      General: Bowel sounds are normal. There is no distension. Palpations: Abdomen is soft. There is no mass. Tenderness: There is abdominal tenderness (RUQ). There is no right CVA tenderness, left CVA tenderness, guarding or rebound. Hernia: No hernia is present. Musculoskeletal: Normal range of motion. General: No swelling, tenderness, deformity or signs of injury. Right lower leg: No edema. Left lower leg: No edema. Lymphadenopathy:      Cervical: No cervical adenopathy. Skin:     General: Skin is warm and dry. Capillary Refill: Capillary refill takes less than 2 seconds. Coloration: Skin is not jaundiced or pale. Findings: No bruising, erythema, lesion or rash. Neurological:      General: No focal deficit present. Mental Status: He is alert and oriented to person, place, and time. Mental status is at baseline. Cranial Nerves: No cranial nerve deficit. Sensory: No sensory deficit. Motor: No weakness. Coordination: Coordination normal.      Gait: Gait normal.      Deep Tendon Reflexes: Reflexes normal.   Psychiatric:         Mood and Affect: Mood normal.         Behavior: Behavior normal.         Thought Content: Thought content normal.         Judgment: Judgment normal.         Assessment and Plan:  Irene Vanegas was seen today for other. Diagnoses and all orders for this visit:    Right upper quadrant abdominal pain  -     XR ABDOMEN (KUB) (SINGLE AP VIEW); Future    Uncontrolled stage 2 hypertension    Stenosis of right carotid artery    Atherosclerosis of native coronary artery of native heart without angina pectoris    Gastroesophageal reflux disease without esophagitis    Malignant neoplasm of prostate (HCC)    Chronic kidney disease, stage III (moderate) (HCC)    Constipation, unspecified constipation type        Discussions/Education provided to patients during visit:  [] Discussed the importance to stop smoking. [] Advised to monitor eating habits. [] Reviewed and discussed Imaging results. [] Reviewed and discussed Lab results. [] Discussed the importance of drinking plenty of fluids. [] Cut down on Salt and Caffeine.  [] Exercise 2-3 times weekly, if not more.   []

## 2020-09-30 PROCEDURE — 74018 RADEX ABDOMEN 1 VIEW: CPT | Performed by: NURSE PRACTITIONER

## 2020-10-19 RX ORDER — RANOLAZINE 1000 MG/1
1000 TABLET, EXTENDED RELEASE ORAL 2 TIMES DAILY
Qty: 180 TABLET | Refills: 1 | Status: SHIPPED
Start: 2020-10-19 | End: 2020-12-01 | Stop reason: SDUPTHER

## 2020-10-26 ENCOUNTER — OFFICE VISIT (OUTPATIENT)
Dept: FAMILY MEDICINE CLINIC | Age: 75
End: 2020-10-26
Payer: MEDICARE

## 2020-10-26 VITALS
HEIGHT: 69 IN | WEIGHT: 240 LBS | HEART RATE: 84 BPM | BODY MASS INDEX: 35.55 KG/M2 | OXYGEN SATURATION: 97 % | SYSTOLIC BLOOD PRESSURE: 122 MMHG | TEMPERATURE: 97.2 F | RESPIRATION RATE: 18 BRPM | DIASTOLIC BLOOD PRESSURE: 70 MMHG

## 2020-10-26 PROCEDURE — 99214 OFFICE O/P EST MOD 30 MIN: CPT | Performed by: FAMILY MEDICINE

## 2020-10-26 RX ORDER — TAMSULOSIN HYDROCHLORIDE 0.4 MG/1
0.4 CAPSULE ORAL 2 TIMES DAILY
COMMUNITY
Start: 2020-09-26

## 2020-10-26 RX ORDER — LISINOPRIL 10 MG/1
TABLET ORAL
COMMUNITY
Start: 2020-08-22 | End: 2020-12-11 | Stop reason: CLARIF

## 2020-10-26 ASSESSMENT — ENCOUNTER SYMPTOMS
PHOTOPHOBIA: 0
APNEA: 0
SINUS PAIN: 0
VOICE CHANGE: 0
FACIAL SWELLING: 0
ABDOMINAL PAIN: 1
COUGH: 0
CHEST TIGHTNESS: 0
RHINORRHEA: 0
SHORTNESS OF BREATH: 0
WHEEZING: 0
SORE THROAT: 0
ABDOMINAL DISTENTION: 0
NAUSEA: 0
DIARRHEA: 1
RECTAL PAIN: 0
CONSTIPATION: 0
VOMITING: 0
BACK PAIN: 0

## 2020-10-26 ASSESSMENT — PATIENT HEALTH QUESTIONNAIRE - PHQ9
SUM OF ALL RESPONSES TO PHQ QUESTIONS 1-9: 0
1. LITTLE INTEREST OR PLEASURE IN DOING THINGS: 0
2. FEELING DOWN, DEPRESSED OR HOPELESS: 0
SUM OF ALL RESPONSES TO PHQ QUESTIONS 1-9: 0
SUM OF ALL RESPONSES TO PHQ QUESTIONS 1-9: 0
SUM OF ALL RESPONSES TO PHQ9 QUESTIONS 1 & 2: 0

## 2020-10-26 NOTE — PROGRESS NOTES
Take 1,000 mg by mouth daily, Disp: , Rfl:     zinc sulfate (ZINCATE) 220 (50 Zn) MG capsule, Take 220 mg by mouth daily, Disp: , Rfl:     TURMERIC PO, Take 500 capsules by mouth daily, Disp: , Rfl:     Magnesium Oxide 400 MG CAPS, Take 2 capsules by mouth 2 times daily , Disp: , Rfl:     Microlet Lancets MISC, 1 each by Does not apply route 4 times daily, Disp: , Rfl:     Omega-3 Fatty Acids (FISH OIL) 1000 MG CAPS, Take 1,000 mg by mouth 2 CAPS IN THE AM 1 CAP IN PM , Disp: , Rfl:     B Complex Vitamins (B COMPLEX 1 PO), Take by mouth daily, Disp: , Rfl:     rosuvastatin (CRESTOR) 10 MG tablet, Take 10 mg by mouth daily, Disp: , Rfl:     lisinopril (PRINIVIL;ZESTRIL) 20 MG tablet, Take 20 mg by mouth Take 1 tablet in a.m and 1/2 in p.m., Disp: , Rfl:     diclofenac sodium (VOLTAREN) 1 % GEL, Apply 4 g topically 4 times daily, Disp: , Rfl:     amoxicillin-clavulanate (AUGMENTIN) 875-125 MG per tablet, Take 1 tablet by mouth 2 times daily for 10 days, Disp: 20 tablet, Rfl: 0    canagliflozin (INVOKANA) 100 MG TABS tablet, Take 100 mg by mouth every morning (before breakfast), Disp: , Rfl:     aspirin 81 MG tablet, Take 81 mg by mouth 2 times daily, Disp: , Rfl:   No Known Allergies    Review of Systems   Constitutional: Negative for appetite change, chills, diaphoresis, fatigue, fever and unexpected weight change. HENT: Negative for congestion, dental problem, ear discharge, ear pain, facial swelling, hearing loss, mouth sores, rhinorrhea, sinus pain, sore throat, tinnitus and voice change. Eyes: Negative for photophobia and visual disturbance (no visual changes). Respiratory: Negative for apnea, cough, chest tightness, shortness of breath and wheezing. Cardiovascular: Negative for chest pain and palpitations. Gastrointestinal: Positive for abdominal pain (upper r and lower r quadrants ) and diarrhea (with some black stool now clear ).  Negative for abdominal distention, constipation, nausea, rectal pain and vomiting. Endocrine: Negative for cold intolerance, heat intolerance and polyuria. Genitourinary: Negative for dysuria, frequency, hematuria and urgency. Musculoskeletal: Negative for arthralgias, back pain, gait problem, joint swelling, myalgias, neck pain and neck stiffness. Skin: Negative for rash and wound. Allergic/Immunologic: Negative for environmental allergies. Neurological: Negative for dizziness, tremors, seizures, syncope, facial asymmetry, speech difficulty, weakness, light-headedness, numbness and headaches. Hematological: Does not bruise/bleed easily. Psychiatric/Behavioral: Negative for agitation, hallucinations and suicidal ideas. The patient is not nervous/anxious.         Past Medical History:   Diagnosis Date    CAD (coronary artery disease)     Diabetes mellitus ([facility] [address]     Diverticular disease of colon     DJD (degenerative joint disease), multiple sites     Goiter     H/O thyroid nodule     Hx of malignant neoplasm of prostate     Hyperlipidemia     Hypertension     Hypothyroidism      Past Surgical History:   Procedure Laterality Date    CAROTID ENDARTERECTOMY  05/25/2017    CHOLECYSTECTOMY      COLONOSCOPY      CORONARY ARTERY BYPASS GRAFT  10/11/2001    HERNIA REPAIR      SKIN CANCER EXCISION      MELONOMA    THYROID SURGERY  09/01/2017    BX    TOTAL KNEE ARTHROPLASTY Left 12/17/2018     Family History   Problem Relation Age of Onset    Diabetes Mother     Cancer Mother     Heart Attack Father     Coronary Art Dis Father      Social History     Socioeconomic History    Marital status:      Spouse name: Not on file    Number of children: Not on file    Years of education: Not on file    Highest education level: Not on file   Occupational History    Not on file   Social Needs    Financial resource strain: Not on file    Food insecurity     Worry: Not on file     Inability: Not on file    Transportation needs     Medical: Not on file     Non-medical: Not on file   Tobacco Use    Smoking status: Former Smoker     Packs/day: 1.00     Years: 5.00     Pack years: 5.00     Last attempt to quit: 1984     Years since quittin.8    Smokeless tobacco: Never Used   Substance and Sexual Activity    Alcohol use: Never     Frequency: Never    Drug use: Never    Sexual activity: Not Currently   Lifestyle    Physical activity     Days per week: Not on file     Minutes per session: Not on file    Stress: Not on file   Relationships    Social connections     Talks on phone: Not on file     Gets together: Not on file     Attends Worship service: Not on file     Active member of club or organization: Not on file     Attends meetings of clubs or organizations: Not on file     Relationship status: Not on file    Intimate partner violence     Fear of current or ex partner: Not on file     Emotionally abused: Not on file     Physically abused: Not on file     Forced sexual activity: Not on file   Other Topics Concern    Not on file   Social History Narrative    Not on file       Patient Active Problem List   Diagnosis    History of knee surgery    Mass of thyroid region    Presence of artificial knee joint, right    Anemia in chronic kidney disease    Uncontrolled stage 2 hypertension    Chronic kidney disease, stage III (moderate)    Hematuria    Hyperkalemia    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 [address]    Acute retention of urine    Coronary atherosclerosis    Gastroesophageal reflux disease    Hyperlipidemia    Vitamin D deficiency        Vitals:    10/26/20 1040   BP: 122/70   Pulse: 84   Resp: 18   Temp: 97.2 °F (36.2 °C)   TempSrc: Temporal   SpO2: 97%   Weight: 240 lb (108.9 kg)   Height: 5' 9\" (1.753 m)       Physical Exam  Vitals signs reviewed. Constitutional:       General: He is not in acute distress. Appearance: He is not ill-appearing. HENT:      Head: Normocephalic. Right Ear: Tympanic membrane, ear canal and external ear normal. There is no impacted cerumen. Left Ear: Tympanic membrane, ear canal and external ear normal. There is no impacted cerumen. Nose: No rhinorrhea. Mouth/Throat:      Pharynx: No posterior oropharyngeal erythema. Eyes:      General:         Right eye: No discharge. Left eye: No discharge. Extraocular Movements: Extraocular movements intact. Pupils: Pupils are equal, round, and reactive to light. Neck:      Musculoskeletal: Neck supple. Vascular: No carotid bruit. Cardiovascular:      Rate and Rhythm: Regular rhythm. Heart sounds: Normal heart sounds. No murmur. No gallop. Pulmonary:      Breath sounds: Normal breath sounds. No wheezing, rhonchi or rales. Abdominal:      General: Bowel sounds are normal.      Palpations: Abdomen is soft. There is no mass. Tenderness: There is abdominal tenderness. There is guarding and rebound (to r side ). Hernia: No hernia is present. Musculoskeletal: Normal range of motion. Lymphadenopathy:      Cervical: No cervical adenopathy. Skin:     General: Skin is warm and dry. Findings: No bruising or rash. Neurological:      General: No focal deficit present. Mental Status: He is alert and oriented to person, place, and time. Cranial Nerves: No cranial nerve deficit. Motor: No weakness. Coordination: Coordination normal.      Gait: Gait normal.   Psychiatric:         Mood and Affect: Mood normal.         Behavior: Behavior normal.         Assessment and Plan:  Xiomara Hernadez was seen today for diarrhea and diabetes.     Diagnoses and all orders for this visit:    Diabetes mellitus with hyperosmolarity (HCC)  -     Insulin Degludec 200 UNIT/ML SOPN; Inject 55 Units into the skin daily AM    Lower abdominal pain  -     CT ABDOMEN PELVIS WO CONTRAST Additional Contrast? Oral; Future    Right upper quadrant abdominal pain  -     CT ABDOMEN PELVIS WO CONTRAST Additional Contrast? Oral; Future    Uncontrolled stage 2 hypertension    Non-toxic multinodular goiter    Diverticulosis          Discussions/Education provided to patients during visit:  [] Discussed the importance to stop smoking. [] Advised to monitor eating habits. [] Reviewed and discussed Imaging results. [x] Reviewed and discussed Lab results. [x] Discussed the importance of drinking plenty of fluids. [x] Cut down on Salt, Caffeine, and Sugar. [x] Continue Medications as Discussed. [x] Communicated with patient any concerns, to phone office. Return in about 2 weeks (around 11/9/2020). Ct scan ordered. follow up in 2 weeks  Seen By:  Royal Turk DO

## 2020-10-31 ENCOUNTER — TELEPHONE (OUTPATIENT)
Dept: FAMILY MEDICINE CLINIC | Age: 75
End: 2020-10-31

## 2020-10-31 ENCOUNTER — HOSPITAL ENCOUNTER (OUTPATIENT)
Dept: CT IMAGING | Age: 75
Discharge: HOME | End: 2020-11-02
Payer: MEDICARE

## 2020-10-31 PROCEDURE — 74176 CT ABD & PELVIS W/O CONTRAST: CPT

## 2020-10-31 PROCEDURE — 6360000004 HC RX CONTRAST MEDICATION: Performed by: RADIOLOGY

## 2020-10-31 RX ORDER — AMOXICILLIN AND CLAVULANATE POTASSIUM 875; 125 MG/1; MG/1
1 TABLET, FILM COATED ORAL 2 TIMES DAILY
Qty: 20 TABLET | Refills: 0 | Status: SHIPPED | OUTPATIENT
Start: 2020-10-31 | End: 2020-11-10

## 2020-10-31 RX ADMIN — IOHEXOL 50 ML: 240 INJECTION, SOLUTION INTRATHECAL; INTRAVASCULAR; INTRAVENOUS; ORAL at 08:31

## 2020-11-01 PROBLEM — E55.9 VITAMIN D DEFICIENCY: Status: ACTIVE | Noted: 2020-10-27

## 2020-11-04 ENCOUNTER — TELEPHONE (OUTPATIENT)
Dept: FAMILY MEDICINE CLINIC | Age: 75
End: 2020-11-04

## 2020-11-05 ENCOUNTER — TELEPHONE (OUTPATIENT)
Dept: FAMILY MEDICINE CLINIC | Age: 75
End: 2020-11-05

## 2020-11-16 ENCOUNTER — OFFICE VISIT (OUTPATIENT)
Dept: FAMILY MEDICINE CLINIC | Age: 75
End: 2020-11-16
Payer: MEDICARE

## 2020-11-16 VITALS
TEMPERATURE: 97.9 F | BODY MASS INDEX: 36.88 KG/M2 | DIASTOLIC BLOOD PRESSURE: 78 MMHG | HEART RATE: 59 BPM | OXYGEN SATURATION: 97 % | RESPIRATION RATE: 20 BRPM | SYSTOLIC BLOOD PRESSURE: 128 MMHG | WEIGHT: 249 LBS | HEIGHT: 69 IN

## 2020-11-16 PROCEDURE — 99214 OFFICE O/P EST MOD 30 MIN: CPT | Performed by: FAMILY MEDICINE

## 2020-11-16 ASSESSMENT — PATIENT HEALTH QUESTIONNAIRE - PHQ9
SUM OF ALL RESPONSES TO PHQ QUESTIONS 1-9: 0
1. LITTLE INTEREST OR PLEASURE IN DOING THINGS: 0
SUM OF ALL RESPONSES TO PHQ QUESTIONS 1-9: 0
2. FEELING DOWN, DEPRESSED OR HOPELESS: 0
SUM OF ALL RESPONSES TO PHQ9 QUESTIONS 1 & 2: 0
SUM OF ALL RESPONSES TO PHQ QUESTIONS 1-9: 0

## 2020-11-16 ASSESSMENT — ENCOUNTER SYMPTOMS
WHEEZING: 0
PHOTOPHOBIA: 0
RECTAL PAIN: 0
ABDOMINAL DISTENTION: 0
FACIAL SWELLING: 0
RHINORRHEA: 0
ABDOMINAL PAIN: 1
VOMITING: 0
NAUSEA: 0
CHEST TIGHTNESS: 0
CONSTIPATION: 0
BACK PAIN: 0
SINUS PAIN: 0
DIARRHEA: 1
VOICE CHANGE: 0
SORE THROAT: 0
COUGH: 0
SHORTNESS OF BREATH: 0
APNEA: 0

## 2020-11-16 NOTE — PROGRESS NOTES
20  Tiffanie Goldberg : 1945 Sex: male  Age: 67 y.o. Chief Complaint   Patient presents with   Daiva Favor     ct done in [facility] , done of upper and lower abdomen ,patient had dirrhea and black stools, patient is no longer having that problem after he stopped taking asprin, but did re start the asprin       Follow up diahrea settling down now   Was found to have diverticulitis and treated with antibiotics           Current Outpatient Medications:     tamsulosin (FLOMAX) 0.4 MG capsule, TAKE 1 CAPSULE BY MOUTH ONCE DAILY AT BEDTIME, Disp: , Rfl:     Insulin Degludec 200 UNIT/ML SOPN, Inject 55 Units into the skin daily AM, Disp: 3 pen, Rfl: 5    ranolazine (RANEXA) 1000 MG extended release tablet, Take 1 tablet by mouth 2 times daily, Disp: 180 tablet, Rfl: 1    ONETOUCH ULTRA strip, USE 1 STRIP TO CHECK GLUCOSE 4 TIMES DAILY, Disp: , Rfl:     carvedilol (COREG) 25 MG tablet, Take 1 tablet by mouth 2 times daily (with meals), Disp: 180 tablet, Rfl: 1    levothyroxine (SYNTHROID) 25 MCG tablet, Take 25 mcg by mouth Daily, Disp: , Rfl:     nitroGLYCERIN (NITROSTAT) 0.4 MG SL tablet, Place 1 tablet under the tongue every 5 minutes as needed for Chest pain up to max of 3 total doses.  If no relief after 1 dose, call 911., Disp: 25 tablet, Rfl: 0    polycarbophil (FIBERCON) 625 MG tablet, Take 625 mg by mouth 2 times daily, Disp: , Rfl:     cloNIDine (CATAPRES) 0.1 MG tablet, Take 0.1 mg by mouth 2 times daily, Disp: , Rfl:     isosorbide mononitrate (IMDUR) 60 MG extended release tablet, Take 60 mg by mouth 2 times daily, Disp: , Rfl:     Garlic 577 MG CAPS, Take by mouth 2 times daily, Disp: , Rfl:     Cholecalciferol (VITAMIN D3) 50 MCG (2000 UT) CAPS, Take by mouth daily, Disp: , Rfl:     Ascorbic Acid (VITAMIN C) 1000 MG tablet, Take 1,000 mg by mouth daily, Disp: , Rfl:     zinc sulfate (ZINCATE) 220 (50 Zn) MG capsule, Take 220 mg by mouth daily, Disp: , Rfl:     TURMERIC PO, Take 500 capsules by mouth daily, Disp: , Rfl:     Magnesium Oxide 400 MG CAPS, Take 2 capsules by mouth 2 times daily , Disp: , Rfl:     Microlet Lancets MISC, 1 each by Does not apply route 4 times daily, Disp: , Rfl:     Omega-3 Fatty Acids (FISH OIL) 1000 MG CAPS, Take 1,000 mg by mouth 2 CAPS IN THE AM 1 CAP IN PM , Disp: , Rfl:     B Complex Vitamins (B COMPLEX 1 PO), Take by mouth daily, Disp: , Rfl:     aspirin 81 MG tablet, Take 81 mg by mouth 2 times daily, Disp: , Rfl:     rosuvastatin (CRESTOR) 10 MG tablet, Take 10 mg by mouth daily, Disp: , Rfl:     diclofenac sodium (VOLTAREN) 1 % GEL, Apply 4 g topically 4 times daily, Disp: , Rfl:     lisinopril (PRINIVIL;ZESTRIL) 10 MG tablet, TAKE 2 TABLETS BY MOUTH ONCE DAILY IN THE EVENING, Disp: , Rfl:     insulin lispro (HUMALOG) 100 UNIT/ML injection vial, Inject 1 Units into the skin 3 times daily (before meals) Sliding Scale as needed, Disp: , Rfl:     lisinopril (PRINIVIL;ZESTRIL) 20 MG tablet, Take 20 mg by mouth Take 1 tablet in a.m and 1/2 in p.m., Disp: , Rfl:   No Known Allergies    Review of Systems   Constitutional: Negative for appetite change, chills, diaphoresis, fatigue, fever and unexpected weight change. HENT: Negative for congestion, dental problem, ear discharge, ear pain, facial swelling, hearing loss, mouth sores, rhinorrhea, sinus pain, sore throat, tinnitus and voice change. Eyes: Negative for photophobia and visual disturbance (no visual changes). Respiratory: Negative for apnea, cough, chest tightness, shortness of breath and wheezing. Cardiovascular: Negative for chest pain and palpitations. Gastrointestinal: Positive for abdominal pain (pain is gone  diahrea better ) and diarrhea (stopping and forming stool ). Negative for abdominal distention, constipation, nausea, rectal pain and vomiting. Endocrine: Negative for cold intolerance, heat intolerance and polyuria. Genitourinary: Positive for frequency. Negative for dysuria, hematuria and urgency. Musculoskeletal: Positive for arthralgias. Negative for back pain, gait problem, joint swelling, myalgias, neck pain and neck stiffness. Skin: Negative for rash and wound. Allergic/Immunologic: Negative for environmental allergies. Neurological: Negative for dizziness, tremors, seizures, syncope, facial asymmetry, speech difficulty, weakness, light-headedness, numbness and headaches. Hematological: Does not bruise/bleed easily. Psychiatric/Behavioral: Negative for agitation, hallucinations and suicidal ideas. The patient is not nervous/anxious.         Past Medical History:   Diagnosis Date    CAD (coronary artery disease)     Diabetes mellitus ([facility] [address]     Diverticular disease of colon     DJD (degenerative joint disease), multiple sites     Goiter     H/O thyroid nodule     Hx of malignant neoplasm of prostate     Hyperlipidemia     Hypertension     Hypothyroidism      Past Surgical History:   Procedure Laterality Date    CAROTID ENDARTERECTOMY  05/25/2017    CHOLECYSTECTOMY      COLONOSCOPY      CORONARY ARTERY BYPASS GRAFT  10/11/2001    HERNIA REPAIR      SKIN CANCER EXCISION      MELONOMA    THYROID SURGERY  09/01/2017    BX    TOTAL KNEE ARTHROPLASTY Left 12/17/2018     Family History   Problem Relation Age of Onset    Diabetes Mother     Cancer Mother     Heart Attack Father     Coronary Art Dis Father      Social History     Socioeconomic History    Marital status:      Spouse name: Not on file    Number of children: Not on file    Years of education: Not on file    Highest education level: Not on file   Occupational History    Not on file   Social Needs    Financial resource strain: Not on file    Food insecurity     Worry: Not on file     Inability: Not on file    Transportation needs     Medical: Not on file     Non-medical: Not on file   Tobacco Use    Smoking status: Former Smoker     Packs/day: 1.00     Years: 5.00     Pack years: 5.00     Last attempt to quit: 1984     Years since quittin.8    Smokeless tobacco: Never Used   Substance and Sexual Activity    Alcohol use: Never     Frequency: Never    Drug use: Never    Sexual activity: Not Currently   Lifestyle    Physical activity     Days per week: Not on file     Minutes per session: Not on file    Stress: Not on file   Relationships    Social connections     Talks on phone: Not on file     Gets together: Not on file     Attends Confucianist service: Not on file     Active member of club or organization: Not on file     Attends meetings of clubs or organizations: Not on file     Relationship status: Not on file    Intimate partner violence     Fear of current or ex partner: Not on file     Emotionally abused: Not on file     Physically abused: Not on file     Forced sexual activity: Not on file   Other Topics Concern    Not on file   Social History Narrative    Not on file       Patient Active Problem List   Diagnosis    History of knee surgery    Mass of thyroid region    Presence of artificial knee joint, right    Anemia in chronic kidney disease    Uncontrolled stage 2 hypertension    Chronic kidney disease, stage III (moderate)    Hematuria    Hyperkalemia    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, or unspecified    Malignant neoplasm of prostate ([address]    Non-toxic multinodular goiter    Postoperative retention of urine    Stenosis of carotid artery    Diabetes mellitus with hyperosmolarity ([address]    Hyperparathyroidism ([address]    Morbidly obese ([address]    Acute retention of urine    Coronary atherosclerosis    Gastroesophageal reflux disease    Hyperlipidemia    Vitamin D deficiency        Vitals:    20 1140   BP: 128/78   Pulse: 59   Resp: 20   Temp: 97.9 °F (36.6 °C)   TempSrc: Temporal   SpO2: 97%   Weight: 249 lb (112.9 kg)   Height: 5' 9\" (1.753 m)       Physical Exam  Vitals signs reviewed. Constitutional:       General: He is not in acute distress. Appearance: He is not ill-appearing. HENT:      Head: Normocephalic. Right Ear: Tympanic membrane, ear canal and external ear normal. There is no impacted cerumen. Left Ear: Tympanic membrane, ear canal and external ear normal. There is no impacted cerumen. Nose: No rhinorrhea. Mouth/Throat:      Pharynx: No posterior oropharyngeal erythema. Eyes:      General:         Right eye: No discharge. Left eye: No discharge. Extraocular Movements: Extraocular movements intact. Pupils: Pupils are equal, round, and reactive to light. Neck:      Musculoskeletal: Neck supple. Vascular: No carotid bruit. Cardiovascular:      Rate and Rhythm: Regular rhythm. Heart sounds: Normal heart sounds. No murmur. No gallop. Pulmonary:      Breath sounds: Normal breath sounds. No wheezing, rhonchi or rales. Abdominal:      Palpations: Abdomen is soft. There is no mass. Tenderness: There is no abdominal tenderness. There is no guarding or rebound. Hernia: No hernia is present. Musculoskeletal: Normal range of motion. Lymphadenopathy:      Cervical: No cervical adenopathy. Skin:     General: Skin is warm and dry. Findings: No bruising or rash. Neurological:      General: No focal deficit present. Mental Status: He is alert and oriented to person, place, and time. Cranial Nerves: No cranial nerve deficit. Motor: No weakness. Coordination: Coordination normal.      Gait: Gait normal.   Psychiatric:         Mood and Affect: Mood normal.         Behavior: Behavior normal.         Assessment and Plan:  Kate Powers was seen today for check-up. Diagnoses and all orders for this visit:    Left lower quadrant abdominal pain  -     XR ABDOMEN (KUB) (SINGLE AP VIEW);  Future    Diverticulitis    Gastroesophageal reflux disease without esophagitis    Non-toxic multinodular goiter          Discussions/Education provided to patients during visit:  [] Discussed the importance to stop smoking. [] Advised to monitor eating habits. [x] Reviewed and discussed Imaging results. [x] Reviewed and discussed Lab results. [x] Discussed the importance of drinking plenty of fluids. [x] Cut down on Salt, Caffeine, and Sugar. [x] Continue Medications as Discussed. [x] Communicated with patient any concerns, to phone office.  kub doesn't really tell us anything     Return in about 1 month (around 12/16/2020). Diverticulitis diet   referal back to dr Paul Zuluaga office gi was judy so refer to gi . Some type of metal was present so I did kub and frankly I don't see any type of metal in colon.   Seen By:  Nathan Miller, DO

## 2020-12-01 RX ORDER — RANOLAZINE 1000 MG/1
1000 TABLET, EXTENDED RELEASE ORAL 2 TIMES DAILY
Qty: 60 TABLET | Refills: 5 | Status: SHIPPED
Start: 2020-12-01 | End: 2021-06-03 | Stop reason: SDUPTHER

## 2020-12-09 LAB — PROSTATE SPECIFIC ANTIGEN: NORMAL

## 2020-12-11 ENCOUNTER — OFFICE VISIT (OUTPATIENT)
Dept: FAMILY MEDICINE CLINIC | Age: 75
End: 2020-12-11
Payer: MEDICARE

## 2020-12-11 VITALS
OXYGEN SATURATION: 97 % | TEMPERATURE: 97.8 F | WEIGHT: 253 LBS | HEIGHT: 69 IN | BODY MASS INDEX: 37.47 KG/M2 | HEART RATE: 61 BPM | DIASTOLIC BLOOD PRESSURE: 85 MMHG | RESPIRATION RATE: 20 BRPM | SYSTOLIC BLOOD PRESSURE: 130 MMHG

## 2020-12-11 PROCEDURE — 1036F TOBACCO NON-USER: CPT | Performed by: FAMILY MEDICINE

## 2020-12-11 PROCEDURE — 1123F ACP DISCUSS/DSCN MKR DOCD: CPT | Performed by: FAMILY MEDICINE

## 2020-12-11 PROCEDURE — G8427 DOCREV CUR MEDS BY ELIG CLIN: HCPCS | Performed by: FAMILY MEDICINE

## 2020-12-11 PROCEDURE — 4040F PNEUMOC VAC/ADMIN/RCVD: CPT | Performed by: FAMILY MEDICINE

## 2020-12-11 PROCEDURE — G8417 CALC BMI ABV UP PARAM F/U: HCPCS | Performed by: FAMILY MEDICINE

## 2020-12-11 PROCEDURE — G8484 FLU IMMUNIZE NO ADMIN: HCPCS | Performed by: FAMILY MEDICINE

## 2020-12-11 PROCEDURE — 99214 OFFICE O/P EST MOD 30 MIN: CPT | Performed by: FAMILY MEDICINE

## 2020-12-11 RX ORDER — CANAGLIFLOZIN 100 MG/1
TABLET, FILM COATED ORAL
COMMUNITY
End: 2021-06-24

## 2020-12-11 ASSESSMENT — ENCOUNTER SYMPTOMS
CHEST TIGHTNESS: 0
RECTAL PAIN: 0
RHINORRHEA: 0
CONSTIPATION: 0
VOMITING: 0
SHORTNESS OF BREATH: 0
SINUS PAIN: 0
PHOTOPHOBIA: 0
ABDOMINAL PAIN: 1
APNEA: 0
WHEEZING: 0
ABDOMINAL DISTENTION: 0
FACIAL SWELLING: 0
COUGH: 0
SORE THROAT: 0
BACK PAIN: 1
DIARRHEA: 1
NAUSEA: 0
VOICE CHANGE: 0

## 2020-12-11 ASSESSMENT — PATIENT HEALTH QUESTIONNAIRE - PHQ9
1. LITTLE INTEREST OR PLEASURE IN DOING THINGS: 0
SUM OF ALL RESPONSES TO PHQ QUESTIONS 1-9: 0
SUM OF ALL RESPONSES TO PHQ9 QUESTIONS 1 & 2: 0
SUM OF ALL RESPONSES TO PHQ QUESTIONS 1-9: 0
2. FEELING DOWN, DEPRESSED OR HOPELESS: 0
DEPRESSION UNABLE TO ASSESS: PT REFUSES
SUM OF ALL RESPONSES TO PHQ QUESTIONS 1-9: 0

## 2020-12-11 NOTE — PROGRESS NOTES
20  Ector Harrell : 1945 Sex: male  Age: 67 y.o. Chief Complaint   Patient presents with    Check-Up     1 month     Diarrhea     patient still having same problems with this        Overall situation and still has some diahrea no abdominal pain          Current Outpatient Medications:     ranolazine (RANEXA) 1000 MG extended release tablet, Take 1 tablet by mouth 2 times daily, Disp: 60 tablet, Rfl: 5    tamsulosin (FLOMAX) 0.4 MG capsule, TAKE 1 CAPSULE BY MOUTH ONCE DAILY AT BEDTIME, Disp: , Rfl:     ONETOUCH ULTRA strip, USE 1 STRIP TO CHECK GLUCOSE 4 TIMES DAILY, Disp: , Rfl:     carvedilol (COREG) 25 MG tablet, Take 1 tablet by mouth 2 times daily (with meals), Disp: 180 tablet, Rfl: 1    insulin lispro (HUMALOG) 100 UNIT/ML injection vial, Inject 1 Units into the skin 3 times daily (before meals) Sliding Scale as needed, Disp: , Rfl:     levothyroxine (SYNTHROID) 25 MCG tablet, Take 25 mcg by mouth Daily, Disp: , Rfl:     nitroGLYCERIN (NITROSTAT) 0.4 MG SL tablet, Place 1 tablet under the tongue every 5 minutes as needed for Chest pain up to max of 3 total doses.  If no relief after 1 dose, call 911., Disp: 25 tablet, Rfl: 0    polycarbophil (FIBERCON) 625 MG tablet, Take 625 mg by mouth 2 times daily, Disp: , Rfl:     isosorbide mononitrate (IMDUR) 60 MG extended release tablet, Take 60 mg by mouth 2 times daily, Disp: , Rfl:     Garlic 563 MG CAPS, Take by mouth 2 times daily, Disp: , Rfl:     Cholecalciferol (VITAMIN D3) 50 MCG (2000 UT) CAPS, Take by mouth daily, Disp: , Rfl:     Ascorbic Acid (VITAMIN C) 1000 MG tablet, Take 1,000 mg by mouth daily, Disp: , Rfl:     zinc sulfate (ZINCATE) 220 (50 Zn) MG capsule, Take 220 mg by mouth daily, Disp: , Rfl:     TURMERIC PO, Take 500 capsules by mouth daily, Disp: , Rfl:     Magnesium Oxide 400 MG CAPS, Take 2 capsules by mouth 2 times daily , Disp: , Rfl:     Microlet Lancets MISC, 1 each by Does not apply route 4 times daily, Disp: , Rfl:     Omega-3 Fatty Acids (FISH OIL) 1000 MG CAPS, Take 1,000 mg by mouth 2 CAPS IN THE AM 1 CAP IN PM , Disp: , Rfl:     B Complex Vitamins (B COMPLEX 1 PO), Take by mouth daily, Disp: , Rfl:     aspirin 81 MG tablet, Take 81 mg by mouth 2 times daily, Disp: , Rfl:     rosuvastatin (CRESTOR) 10 MG tablet, Take 10 mg by mouth daily, Disp: , Rfl:     diclofenac sodium (VOLTAREN) 1 % GEL, Apply 4 g topically 4 times daily, Disp: , Rfl:     canagliflozin (INVOKANA) 100 MG TABS tablet, invokana  100 mg tabs, Disp: , Rfl:   No Known Allergies    Review of Systems   Constitutional: Negative for appetite change, chills, diaphoresis, fatigue, fever and unexpected weight change. HENT: Negative for congestion, dental problem, ear discharge, ear pain, facial swelling, hearing loss, mouth sores, rhinorrhea, sinus pain, sore throat, tinnitus and voice change. Eyes: Negative for photophobia and visual disturbance (no visual changes). Respiratory: Negative for apnea, cough, chest tightness, shortness of breath and wheezing. Cardiovascular: Negative for chest pain and palpitations. Gastrointestinal: Positive for abdominal pain (resolved but very gaseous ) and diarrhea. Negative for abdominal distention, constipation, nausea, rectal pain and vomiting. Endocrine: Negative for cold intolerance, heat intolerance and polyuria. Genitourinary: Negative for dysuria, frequency, hematuria and urgency. Musculoskeletal: Positive for arthralgias and back pain. Negative for gait problem, joint swelling, myalgias, neck pain and neck stiffness. Skin: Negative for rash and wound. Allergic/Immunologic: Negative for environmental allergies. Neurological: Negative for dizziness, tremors, seizures, syncope, facial asymmetry, speech difficulty, weakness, light-headedness, numbness and headaches. Hematological: Does not bruise/bleed easily.    Psychiatric/Behavioral: Negative for agitation, hallucinations and suicidal ideas. The patient is not nervous/anxious.         Past Medical History:   Diagnosis Date    CAD (coronary artery disease)     Diabetes mellitus ([facility] [address]     Diverticular disease of colon     DJD (degenerative joint disease), multiple sites     Goiter     H/O thyroid nodule     Hx of malignant neoplasm of prostate     Hyperlipidemia     Hypertension     Hypothyroidism      Past Surgical History:   Procedure Laterality Date    CAROTID ENDARTERECTOMY  2017    CHOLECYSTECTOMY      COLONOSCOPY      CORONARY ARTERY BYPASS GRAFT  10/11/2001    HERNIA REPAIR      SKIN CANCER EXCISION      MELONOMA    THYROID SURGERY  2017    BX    TOTAL KNEE ARTHROPLASTY Left 2018     Family History   Problem Relation Age of Onset    Diabetes Mother     Cancer Mother     Heart Attack Father     Coronary Art Dis Father      Social History     Socioeconomic History    Marital status:      Spouse name: Not on file    Number of children: Not on file    Years of education: Not on file    Highest education level: Not on file   Occupational History    Not on file   Social Needs    Financial resource strain: Not on file    Food insecurity     Worry: Not on file     Inability: Not on file    Transportation needs     Medical: Not on file     Non-medical: Not on file   Tobacco Use    Smoking status: Former Smoker     Packs/day: 1.00     Years: 5.00     Pack years: 5.00     Last attempt to quit: 1984     Years since quittin.9    Smokeless tobacco: Never Used   Substance and Sexual Activity    Alcohol use: Never     Frequency: Never    Drug use: Never    Sexual activity: Not Currently   Lifestyle    Physical activity     Days per week: Not on file     Minutes per session: Not on file    Stress: Not on file   Relationships    Social connections     Talks on phone: Not on file     Gets together: Not on file     Attends Shinto service: Not on file Right eye: No discharge. Left eye: No discharge. Extraocular Movements: Extraocular movements intact. Pupils: Pupils are equal, round, and reactive to light. Neck:      Musculoskeletal: Normal range of motion and neck supple. Vascular: No carotid bruit. Cardiovascular:      Rate and Rhythm: Regular rhythm. Heart sounds: Normal heart sounds. No murmur. No gallop. Pulmonary:      Breath sounds: Rhonchi present. No wheezing or rales. Abdominal:      General: There is distension (slight distension gas no pain at all diarrhea persisits ). Palpations: Abdomen is soft. There is no mass. Tenderness: There is no abdominal tenderness. There is no guarding or rebound. Hernia: No hernia is present. Musculoskeletal: Normal range of motion. Lymphadenopathy:      Cervical: No cervical adenopathy. Skin:     General: Skin is warm and dry. Findings: No bruising or rash. Neurological:      General: No focal deficit present. Mental Status: He is alert and oriented to person, place, and time. Cranial Nerves: No cranial nerve deficit. Motor: No weakness. Coordination: Coordination normal.      Gait: Gait normal.   Psychiatric:         Mood and Affect: Mood normal.         Behavior: Behavior normal.         Assessment and Plan:  Driss Lira was seen today for check-up and diarrhea. Diagnoses and all orders for this visit:    Diverticulosis large intestine w/o perforation or abscess w/bleeding    Diverticulitis of large intestine without bleeding, unspecified complication status    Atherosclerosis of native coronary artery of native heart without angina pectoris    Uncontrolled stage 2 hypertension    Diabetes mellitus with hyperosmolarity (HCC)    Malignant neoplasm of prostate ([facility] [address]          Discussions/Education provided to patients during visit:  [] Discussed the importance to stop smoking. [] Advised to monitor eating habits.   [x] Reviewed and discussed Imaging results. [x] Reviewed and discussed Lab results. [x] Discussed the importance of drinking plenty of fluids. [x] Cut down on Salt, Caffeine, and Sugar. [x] Continue Medications as Discussed. [x] Communicated with patient any concerns, to phone office. Discussed diarrhea  Made the following changes ,was treated with antibiotic and is taking probiotic . Decrease mag ox to 2 per day ,dc fibercon call in 2 weeks as to how doing consider citrucil . No futher antibiotics needed and pt will get disc to decide on metal in colon that was not seenon ellis in office . Mylicon 80 prn for gas    Return in about 2 months (around 2/11/2021).       Seen By:  Marc Servin,

## 2020-12-16 RX ORDER — LEVOTHYROXINE SODIUM 0.03 MG/1
25 TABLET ORAL DAILY
Qty: 90 TABLET | Refills: 1 | Status: SHIPPED
Start: 2020-12-16 | End: 2021-06-24 | Stop reason: DRUGHIGH

## 2021-01-15 ENCOUNTER — TELEPHONE (OUTPATIENT)
Dept: FAMILY MEDICINE CLINIC | Age: 76
End: 2021-01-15

## 2021-01-15 NOTE — TELEPHONE ENCOUNTER
Called to ask him how he takes his Invokana. We got a fax request from [facility] for a refill. He said it is ordered by Dr Tiara Lujan but is easier for him to ask us to refill it. However he does not need a refill, he has 3 left. And he does not get it thru [facility]. Fax request discarded.

## 2021-02-08 ENCOUNTER — NURSE ONLY (OUTPATIENT)
Dept: FAMILY MEDICINE CLINIC | Age: 76
End: 2021-02-08
Payer: MEDICARE

## 2021-02-08 DIAGNOSIS — E21.3 HYPERPARATHYROIDISM (HCC): ICD-10-CM

## 2021-02-08 DIAGNOSIS — E78.5 HYPERLIPIDEMIA, UNSPECIFIED HYPERLIPIDEMIA TYPE: ICD-10-CM

## 2021-02-08 DIAGNOSIS — C61 MALIGNANT NEOPLASM OF PROSTATE (HCC): ICD-10-CM

## 2021-02-08 DIAGNOSIS — R22.1 MASS OF THYROID REGION: ICD-10-CM

## 2021-02-08 DIAGNOSIS — E87.5 HYPERKALEMIA: ICD-10-CM

## 2021-02-08 DIAGNOSIS — E11.00 DIABETES MELLITUS WITH HYPEROSMOLARITY (HCC): Primary | ICD-10-CM

## 2021-02-08 DIAGNOSIS — Z98.890 HISTORY OF KNEE SURGERY: ICD-10-CM

## 2021-02-08 DIAGNOSIS — E11.00 DIABETES MELLITUS WITH HYPEROSMOLARITY (HCC): ICD-10-CM

## 2021-02-08 DIAGNOSIS — R33.8 ACUTE RETENTION OF URINE: ICD-10-CM

## 2021-02-08 DIAGNOSIS — I25.10 ATHEROSCLEROSIS OF NATIVE CORONARY ARTERY OF NATIVE HEART WITHOUT ANGINA PECTORIS: ICD-10-CM

## 2021-02-08 DIAGNOSIS — R31.1 BENIGN ESSENTIAL MICROSCOPIC HEMATURIA: ICD-10-CM

## 2021-02-08 LAB
ALBUMIN SERPL-MCNC: 3.9 G/DL (ref 3.5–5.2)
ALP BLD-CCNC: 82 U/L (ref 40–129)
ALT SERPL-CCNC: 29 U/L (ref 0–40)
ANION GAP SERPL CALCULATED.3IONS-SCNC: 13 MMOL/L (ref 7–16)
AST SERPL-CCNC: 27 U/L (ref 0–39)
BASOPHILS ABSOLUTE: 0.06 E9/L (ref 0–0.2)
BASOPHILS RELATIVE PERCENT: 0.9 % (ref 0–2)
BILIRUB SERPL-MCNC: 0.5 MG/DL (ref 0–1.2)
BUN BLDV-MCNC: 17 MG/DL (ref 8–23)
CALCIUM SERPL-MCNC: 9.4 MG/DL (ref 8.6–10.2)
CHLORIDE BLD-SCNC: 102 MMOL/L (ref 98–107)
CHOLESTEROL, TOTAL: 179 MG/DL (ref 0–199)
CO2: 25 MMOL/L (ref 22–29)
CREAT SERPL-MCNC: 1.4 MG/DL (ref 0.7–1.2)
EOSINOPHILS ABSOLUTE: 0.44 E9/L (ref 0.05–0.5)
EOSINOPHILS RELATIVE PERCENT: 6.3 % (ref 0–6)
GFR AFRICAN AMERICAN: 59
GFR NON-AFRICAN AMERICAN: 49 ML/MIN/1.73
GLUCOSE BLD-MCNC: 143 MG/DL (ref 74–99)
HCT VFR BLD CALC: 50.7 % (ref 37–54)
HDLC SERPL-MCNC: 38 MG/DL
HEMOGLOBIN: 16.4 G/DL (ref 12.5–16.5)
IMMATURE GRANULOCYTES #: 0.03 E9/L
IMMATURE GRANULOCYTES %: 0.4 % (ref 0–5)
LDL CHOLESTEROL CALCULATED: 82 MG/DL (ref 0–99)
LYMPHOCYTES ABSOLUTE: 2.09 E9/L (ref 1.5–4)
LYMPHOCYTES RELATIVE PERCENT: 29.8 % (ref 20–42)
MCH RBC QN AUTO: 30.3 PG (ref 26–35)
MCHC RBC AUTO-ENTMCNC: 32.3 % (ref 32–34.5)
MCV RBC AUTO: 93.7 FL (ref 80–99.9)
MONOCYTES ABSOLUTE: 0.64 E9/L (ref 0.1–0.95)
MONOCYTES RELATIVE PERCENT: 9.1 % (ref 2–12)
NEUTROPHILS ABSOLUTE: 3.76 E9/L (ref 1.8–7.3)
NEUTROPHILS RELATIVE PERCENT: 53.5 % (ref 43–80)
PDW BLD-RTO: 13.1 FL (ref 11.5–15)
PLATELET # BLD: 171 E9/L (ref 130–450)
PMV BLD AUTO: 12.2 FL (ref 7–12)
POTASSIUM SERPL-SCNC: 4.6 MMOL/L (ref 3.5–5)
RBC # BLD: 5.41 E12/L (ref 3.8–5.8)
SODIUM BLD-SCNC: 140 MMOL/L (ref 132–146)
T4 FREE: 1.03 NG/DL (ref 0.93–1.7)
TOTAL PROTEIN: 6.9 G/DL (ref 6.4–8.3)
TRIGL SERPL-MCNC: 295 MG/DL (ref 0–149)
TSH SERPL DL<=0.05 MIU/L-ACNC: 4.65 UIU/ML (ref 0.27–4.2)
VLDLC SERPL CALC-MCNC: 59 MG/DL
WBC # BLD: 7 E9/L (ref 4.5–11.5)

## 2021-02-08 PROCEDURE — 36415 COLL VENOUS BLD VENIPUNCTURE: CPT | Performed by: NURSE PRACTITIONER

## 2021-02-09 LAB — HBA1C MFR BLD: 7 % (ref 4–5.6)

## 2021-02-11 ENCOUNTER — OFFICE VISIT (OUTPATIENT)
Dept: FAMILY MEDICINE CLINIC | Age: 76
End: 2021-02-11
Payer: MEDICARE

## 2021-02-11 VITALS
OXYGEN SATURATION: 97 % | RESPIRATION RATE: 16 BRPM | TEMPERATURE: 96.9 F | DIASTOLIC BLOOD PRESSURE: 80 MMHG | SYSTOLIC BLOOD PRESSURE: 130 MMHG | HEART RATE: 80 BPM | HEIGHT: 69 IN | BODY MASS INDEX: 36.14 KG/M2 | WEIGHT: 244 LBS

## 2021-02-11 DIAGNOSIS — I65.21 STENOSIS OF RIGHT CAROTID ARTERY: ICD-10-CM

## 2021-02-11 DIAGNOSIS — I25.10 CORONARY ARTERY DISEASE INVOLVING NATIVE CORONARY ARTERY OF NATIVE HEART WITHOUT ANGINA PECTORIS: ICD-10-CM

## 2021-02-11 DIAGNOSIS — E55.9 VITAMIN D DEFICIENCY: ICD-10-CM

## 2021-02-11 DIAGNOSIS — E21.3 HYPERPARATHYROIDISM (HCC): ICD-10-CM

## 2021-02-11 DIAGNOSIS — K57.30 DIVERTICULAR DISEASE OF COLON: ICD-10-CM

## 2021-02-11 DIAGNOSIS — N18.31 TYPE 2 DIABETES MELLITUS WITH STAGE 3A CHRONIC KIDNEY DISEASE, WITH LONG-TERM CURRENT USE OF INSULIN (HCC): Primary | ICD-10-CM

## 2021-02-11 DIAGNOSIS — Z11.59 ENCOUNTER FOR HEPATITIS C SCREENING TEST FOR LOW RISK PATIENT: ICD-10-CM

## 2021-02-11 DIAGNOSIS — Z85.46 HX OF MALIGNANT NEOPLASM OF PROSTATE: ICD-10-CM

## 2021-02-11 DIAGNOSIS — E11.22 TYPE 2 DIABETES MELLITUS WITH STAGE 3A CHRONIC KIDNEY DISEASE, WITH LONG-TERM CURRENT USE OF INSULIN (HCC): Primary | ICD-10-CM

## 2021-02-11 DIAGNOSIS — E03.9 HYPOTHYROIDISM, UNSPECIFIED TYPE: ICD-10-CM

## 2021-02-11 DIAGNOSIS — R33.8 ACUTE RETENTION OF URINE: ICD-10-CM

## 2021-02-11 DIAGNOSIS — E04.9 GOITER: ICD-10-CM

## 2021-02-11 DIAGNOSIS — Z79.4 TYPE 2 DIABETES MELLITUS WITH STAGE 3A CHRONIC KIDNEY DISEASE, WITH LONG-TERM CURRENT USE OF INSULIN (HCC): Primary | ICD-10-CM

## 2021-02-11 DIAGNOSIS — I10 ESSENTIAL HYPERTENSION: ICD-10-CM

## 2021-02-11 DIAGNOSIS — E78.01 FAMILIAL HYPERCHOLESTEROLEMIA: ICD-10-CM

## 2021-02-11 DIAGNOSIS — M15.9 PRIMARY OSTEOARTHRITIS INVOLVING MULTIPLE JOINTS: ICD-10-CM

## 2021-02-11 DIAGNOSIS — Z86.39 H/O THYROID NODULE: ICD-10-CM

## 2021-02-11 DIAGNOSIS — L71.9 ROSACEA: ICD-10-CM

## 2021-02-11 DIAGNOSIS — C61 MALIGNANT NEOPLASM OF PROSTATE (HCC): ICD-10-CM

## 2021-02-11 DIAGNOSIS — E66.01 MORBIDLY OBESE (HCC): ICD-10-CM

## 2021-02-11 PROBLEM — L71.1 RHINOPHYMA: Status: ACTIVE | Noted: 2021-02-11

## 2021-02-11 PROBLEM — L25.8 CONTACT DERMATITIS DUE TO RADIATION: Status: ACTIVE | Noted: 2021-02-11

## 2021-02-11 PROBLEM — D49.2 NEOPLASM OF SOFT TISSUE: Status: ACTIVE | Noted: 2021-02-11

## 2021-02-11 PROBLEM — E11.9 DIABETES MELLITUS (HCC): Status: ACTIVE | Noted: 2020-07-15

## 2021-02-11 PROCEDURE — 1123F ACP DISCUSS/DSCN MKR DOCD: CPT | Performed by: NURSE PRACTITIONER

## 2021-02-11 PROCEDURE — 99214 OFFICE O/P EST MOD 30 MIN: CPT | Performed by: NURSE PRACTITIONER

## 2021-02-11 PROCEDURE — 4040F PNEUMOC VAC/ADMIN/RCVD: CPT | Performed by: NURSE PRACTITIONER

## 2021-02-11 PROCEDURE — G8417 CALC BMI ABV UP PARAM F/U: HCPCS | Performed by: NURSE PRACTITIONER

## 2021-02-11 PROCEDURE — 1036F TOBACCO NON-USER: CPT | Performed by: NURSE PRACTITIONER

## 2021-02-11 PROCEDURE — 3051F HG A1C>EQUAL 7.0%<8.0%: CPT | Performed by: NURSE PRACTITIONER

## 2021-02-11 PROCEDURE — G8427 DOCREV CUR MEDS BY ELIG CLIN: HCPCS | Performed by: NURSE PRACTITIONER

## 2021-02-11 PROCEDURE — G8484 FLU IMMUNIZE NO ADMIN: HCPCS | Performed by: NURSE PRACTITIONER

## 2021-02-11 RX ORDER — ZOSTER VACCINE RECOMBINANT, ADJUVANTED 50 MCG/0.5
KIT INTRAMUSCULAR
COMMUNITY
End: 2021-02-11

## 2021-02-11 RX ORDER — INSULIN DEGLUDEC 200 U/ML
55 INJECTION, SOLUTION SUBCUTANEOUS
COMMUNITY

## 2021-02-11 ASSESSMENT — ENCOUNTER SYMPTOMS
TROUBLE SWALLOWING: 0
VOMITING: 0
CHEST TIGHTNESS: 0
ABDOMINAL PAIN: 0
WHEEZING: 0
NAUSEA: 0
COUGH: 0
BACK PAIN: 0
DIARRHEA: 0
SHORTNESS OF BREATH: 0
VOICE CHANGE: 0
BLOOD IN STOOL: 0
CONSTIPATION: 0

## 2021-02-11 NOTE — PATIENT INSTRUCTIONS
Patient Education        Body Mass Index: Care Instructions  Your Care Instructions     Body mass index (BMI) can help you see if your weight is raising your risk for health problems. It uses a formula to compare how much you weigh with how tall you are. · A BMI lower than 18.5 is considered underweight. · A BMI between 18.5 and 24.9 is considered healthy. · A BMI between 25 and 29.9 is considered overweight. A BMI of 30 or higher is considered obese. If your BMI is in the normal range, it means that you have a lower risk for weight-related health problems. If your BMI is in the overweight or obese range, you may be at increased risk for weight-related health problems, such as high blood pressure, heart disease, stroke, arthritis or joint pain, and diabetes. If your BMI is in the underweight range, you may be at increased risk for health problems such as fatigue, lower protection (immunity) against illness, muscle loss, bone loss, hair loss, and hormone problems. BMI is just one measure of your risk for weight-related health problems. You may be at higher risk for health problems if you are not active, you eat an unhealthy diet, or you drink too much alcohol or use tobacco products. Follow-up care is a key part of your treatment and safety. Be sure to make and go to all appointments, and call your doctor if you are having problems. It's also a good idea to know your test results and keep a list of the medicines you take. How can you care for yourself at home? · Practice healthy eating habits. This includes eating plenty of fruits, vegetables, whole grains, lean protein, and low-fat dairy. · If your doctor recommends it, get more exercise. Walking is a good choice. Bit by bit, increase the amount you walk every day. Try for at least 30 minutes on most days of the week. · Do not smoke. Smoking can increase your risk for health problems.  If you need help quitting, talk to your doctor about stop-smoking programs and medicines. These can increase your chances of quitting for good. · Limit alcohol to 2 drinks a day for men and 1 drink a day for women. Too much alcohol can cause health problems. If you have a BMI higher than 25  · Your doctor may do other tests to check your risk for weight-related health problems. This may include measuring the distance around your waist. A waist measurement of more than 40 inches in men or 35 inches in women can increase the risk of weight-related health problems. · Talk with your doctor about steps you can take to stay healthy or improve your health. You may need to make lifestyle changes to lose weight and stay healthy, such as changing your diet and getting regular exercise. If you have a BMI lower than 18.5  · Your doctor may do other tests to check your risk for health problems. · Talk with your doctor about steps you can take to stay healthy or improve your health. You may need to make lifestyle changes to gain or maintain weight and stay healthy, such as getting more healthy foods in your diet and doing exercises to build muscle. Where can you learn more? Go to https://OneNamealexia.Adim8. org and sign in to your Photomedex account. Enter S176 in the Curves box to learn more about \"Body Mass Index: Care Instructions. \"     If you do not have an account, please click on the \"Sign Up Now\" link. Current as of: December 11, 2019               Content Version: 12.6  © 6860-5361 [employer]. Care instructions adapted under license by [facility] ([facility]). If you have questions about a medical condition or this instruction, always ask your healthcare professional. [place] [address] any warranty or liability for your use of this information. Patient Education        Carotid Stenosis: Care Instructions  Your Care Instructions     Carotid stenosis is narrowing of one or both of the carotid arteries.  These arteries take blood from the heart to the brain. There is one on each side of the neck. A substance called plaque builds up inside an artery. This makes it too narrow. Plaque comes from damage to the artery over time. This damage may be caused by high blood pressure, high cholesterol, diabetes, or smoking. Sometimes plaque can break loose from the carotid artery and move to the brain. This can cause a stroke or transient ischemic attack (TIA). The goal of treatment is to lower your risk of having a stroke or TIA. You can lower your risk by making healthy lifestyle changes and taking medicine. Sometimes a surgery or procedure is done. Follow-up care is a key part of your treatment and safety. Be sure to make and go to all appointments, and call your doctor if you are having problems. It's also a good idea to know your test results and keep a list of the medicines you take. How can you care for yourself at home? · Take your medicines exactly as prescribed. Call your doctor if you think you are having a problem with your medicine. You may take medicine to lower your blood pressure, to lower your cholesterol, or to prevent blood clots. · If you take a blood thinner, such as aspirin, be sure to get instructions about how to take your medicine safely. Blood thinners can cause serious bleeding problems. · Do not smoke. People who smoke have a higher chance of stroke than those who quit. If you need help quitting, talk to your doctor about stop-smoking programs and medicines. These can increase your chances of quitting for good. · Eat a healthy diet that is low in saturated fat and salt. Eat lots of fresh fruits and vegetables and foods high in fiber. · Stay at a healthy weight. Lose weight if you need to. · Talk to your doctor about starting an exercise program. Regular exercise lowers your chance of stroke. · Limit alcohol to 2 drinks a day for men and 1 drink a day for women. Too much alcohol can cause health problems.   · Work with your doctor to control high blood pressure, high cholesterol, diabetes, and other conditions that increase your chance of a stroke. A healthy diet, exercise, weight loss (if needed), and medicines can help. · Avoid colds and flu. Get the flu vaccine every year. When should you call for help? Call 911 anytime you think you may need emergency care. For example, call if:    · You passed out (lost consciousness).     · You have symptoms of a stroke. These may include:  ? Sudden numbness, tingling, weakness, or loss of movement in your face, arm, or leg, especially on only one side of your body. ? Sudden vision changes. ? Sudden trouble speaking. ? Sudden confusion or trouble understanding simple statements. ? Sudden problems with walking or balance. ? A sudden, severe headache that is different from past headaches. Call your doctor now or seek immediate medical care if:    · You are dizzy or lightheaded, or you feel like you may faint. Watch closely for changes in your health, and be sure to contact your doctor if you have any problems. Where can you learn more? Go to https://KVZ Sports.Everpay. org and sign in to your ColdSpark account. Enter K175 in the [place] box to learn more about \"Carotid Stenosis: Care Instructions. \"     If you do not have an account, please click on the \"Sign Up Now\" link. Current as of: December 16, 2019               Content Version: 12.6  © 2065-3813 [employer]. Care instructions adapted under license by [facility] ([facility]). If you have questions about a medical condition or this instruction, always ask your healthcare professional. [place] [address] any warranty or liability for your use of this information. Patient Education        Diabetes Foot Health: Care Instructions  Your Care Instructions     When you have diabetes, your feet need extra care and attention.  Diabetes can damage the nerve endings and blood vessels in your feet, making you less likely to notice when your feet are injured. Diabetes also limits your body's ability to fight infection and get blood to areas that need it. If you get a minor foot injury, it could become an ulcer or a serious infection. With good foot care, you can prevent most of these problems. Caring for your feet can be quick and easy. Most of the care can be done when you are bathing or getting ready for bed. Follow-up care is a key part of your treatment and safety. Be sure to make and go to all appointments, and call your doctor if you are having problems. It's also a good idea to know your test results and keep a list of the medicines you take. How can you care for yourself at home? · Keep your blood sugar close to normal by watching what and how much you eat, monitoring blood sugar, taking medicines if prescribed, and getting regular exercise. · Do not smoke. Smoking affects blood flow and can make foot problems worse. If you need help quitting, talk to your doctor about stop-smoking programs and medicines. These can increase your chances of quitting for good. · Eat a diet that is low in fats. High fat intake can cause fat to build up in your blood vessels and decrease blood flow. · Inspect your feet daily for blisters, cuts, cracks, or sores. If you cannot see well, use a mirror or have someone help you. · Take care of your feet:  ? Wash your feet every day. Use warm (not hot) water. Check the water temperature with your wrists or other part of your body, not your feet. ? Dry your feet well. Pat them dry. Do not rub the skin on your feet too hard. Dry well between your toes. If the skin on your feet stays moist, bacteria or a fungus can grow, which can lead to infection. ? Keep your skin soft. Use moisturizing skin cream to keep the skin on your feet soft and prevent calluses and cracks.  But do not put the cream between your toes, and stop using any cream that causes a calluses, or an ingrown toenail.     · You have blue or black areas, which can mean bruising or blood flow problems.     · You have peeling skin or tiny blisters between your toes or cracking or oozing of the skin.     · You have a fever for more than 24 hours and a foot sore.     · You have new numbness or tingling in your feet that does not go away after you move your feet or change positions.     · You have unexplained or unusual swelling of the foot or ankle. Watch closely for changes in your health, and be sure to contact your doctor if:    · You cannot do proper foot care. Where can you learn more? Go to https://Grid Mobilepepiceweb.Gigawatt. org and sign in to your Insportant account. Enter A739 in the Bespoke box to learn more about \"Diabetes Foot Health: Care Instructions. \"     If you do not have an account, please click on the \"Sign Up Now\" link. Current as of: December 20, 2019               Content Version: 12.6  © 5554-8863 [employer]. Care instructions adapted under license by [facility] ([facility]). If you have questions about a medical condition or this instruction, always ask your healthcare professional. [place] [address] any warranty or liability for your use of this information. Patient Education        Diverticulosis: Care Instructions  Your Care Instructions  In diverticulosis, pouches called diverticula form in the wall of the large intestine (colon). The pouches do not cause any pain or other symptoms. Most people who have diverticulosis do not know they have it. But the pouches sometimes bleed, and if they become infected, they can cause pain and other symptoms. When this happens, it is called diverticulitis. Diverticula form when pressure pushes the wall of the colon outward at certain weak points. A diet that is too low in fiber can cause diverticula. Follow-up care is a key part of your treatment and safety.  Be sure to make and go to all appointments, and call your doctor if you are having problems. It's also a good idea to know your test results and keep a list of the medicines you take. How can you care for yourself at home? · Include fruits, leafy green vegetables, beans, and whole grains in your diet each day. These foods are high in fiber. · Take a fiber supplement, such as Citrucel or Metamucil, every day if needed. Read and follow all instructions on the label. · Drink plenty of fluids, enough so that your urine is light yellow or clear like water. If you have kidney, heart, or liver disease and have to limit fluids, talk with your doctor before you increase the amount of fluids you drink. · Get at least 30 minutes of exercise on most days of the week. Walking is a good choice. You also may want to do other activities, such as running, swimming, cycling, or playing tennis or team sports. · Cut out foods that cause gas, pain, or other symptoms. When should you call for help? Call your doctor now or seek immediate medical care if:    · You have belly pain.     · You pass maroon or very bloody stools.     · You have a fever.     · You have nausea and vomiting.     · You have unusual changes in your bowel movements or abdominal swelling.     · You have burning pain when you urinate.     · You have abnormal vaginal discharge.     · You have shoulder pain.     · You have cramping pain that does not get better when you have a bowel movement or pass gas.     · You pass gas or stool from your urethra while urinating. Watch closely for changes in your health, and be sure to contact your doctor if you have any problems. Where can you learn more? Go to https://Web Reservations InternationalsandiIPICO.Emos Futures. org and sign in to your Wireless Seismic account. Enter C340 in the TOOVIA box to learn more about \"Diverticulosis: Care Instructions. \"     If you do not have an account, please click on the \"Sign Up Now\" link.   Current as of: April 15, 2020               Content Version: 12.6  © 3343-4297 [employer]. Care instructions adapted under license by [facility] ([facility]). If you have questions about a medical condition or this instruction, always ask your healthcare professional. [address] any warranty or liability for your use of this information.

## 2021-02-11 NOTE — PROGRESS NOTES
21  Kvng Sessions : 1945 Sex: male  Age: 67 y.o. Chief Complaint   Patient presents with    Medication Refill    6 Month Follow-Up    Diarrhea     Has been seeing Dr Debi Alvarez for this cancelled his Colonoscopy due to the COVID climate       Here for Routine follow up  This is a very pleasant 67 y.o. 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, or unspecified     Malignant neoplasm of prostate (HCC)     Non-toxic multinodular goiter     Postoperative retention of urine     Stenosis of carotid artery     Diabetes mellitus ([address]     Hyperparathyroidism ([address]     Morbidly obese ([address]     Acute retention of urine     CAD (coronary artery disease)     GERD (gastroesophageal reflux disease)     Hyperlipidemia     Vitamin D deficiency     Contact dermatitis due to radiation     Neoplasm of soft tissue     Rhinophyma     Rosacea     Diverticular disease of colon     DJD (degenerative joint disease), multiple sites     Goiter     Hypothyroidism     Hx of malignant neoplasm of prostate     H/O thyroid nodule  . He has been very compliant with her medication regimen along with care gaps. He denies any medication complications at this time. No new symptomology reported. He has no new complaints today. No recent unexplained weight gains or weight losses. Denies any recent hospitalizations or ER visits since her last visit.     Care Gaps we will be focusing on this visit are:    Hepatitis C screen due on 1945  DTaP/Tdap/Td vaccine(1 - Tdap) due on 1963  Annual Wellness Visit (AWV) due on 2020  Shingles Vaccine(2 of 2) due on 2021   Omega-3 Fatty Acids (FISH OIL) 1000 MG CAPS, Take 1,000 mg by mouth 2 CAPS IN THE AM 1 CAP IN PM , Disp: , Rfl:   rosuvastatin (CRESTOR) 10 MG tablet, Take 10 mg by mouth daily, Disp: , Rfl:  without symptoms, no complications with dietary treatment regimen reporting no side effects or intolereances. Compliant with treatment and diet. No muscle aches, new joint pains or abd pain. Hypothyroid: Remains controlled, without symptoms, on  levothyroxine (SYNTHROID) 25 MCG tablet, Take 1 tablet by mouth daily, Disp: 90 tablet, Rfl: 1  with no evidence of weight gain, palpitations, fatigue, difficulty swallowing, neck prominence or hair loss. Is better on medications and worsens with forgetting to take medications. Has been on medications religiously since last visit. Review of Systems   Constitutional: Negative for activity change, chills, diaphoresis, fatigue, fever and unexpected weight change. HENT: Negative for trouble swallowing and voice change. Eyes: Negative for visual disturbance. Respiratory: Negative for cough, chest tightness, shortness of breath and wheezing. Cardiovascular: Negative for chest pain, palpitations and leg swelling. Gastrointestinal: Negative for abdominal pain, blood in stool, constipation, diarrhea, nausea and vomiting. Endocrine: Negative for polydipsia, polyphagia and polyuria. Genitourinary: Negative for dysuria, enuresis, frequency and hematuria. Musculoskeletal: Negative for arthralgias, back pain, gait problem, joint swelling, myalgias and neck stiffness. Skin: Negative for rash. Neurological: Negative for dizziness, seizures, syncope, facial asymmetry, weakness, light-headedness, numbness and headaches. Hematological: Does not bruise/bleed easily. Psychiatric/Behavioral: Negative for behavioral problems, confusion, hallucinations and suicidal ideas. The patient is not nervous/anxious.           Current Outpatient Medications:     Insulin Degludec , Disp: , Rfl:     B Complex Vitamins (B COMPLEX 1 PO), Take by mouth daily, Disp: , Rfl:     aspirin 81 MG tablet, Take 81 mg by mouth 2 times daily, Disp: , Rfl:     rosuvastatin (CRESTOR) 10 MG tablet, Take 10 mg by mouth daily, Disp: , Rfl:     diclofenac sodium (VOLTAREN) 1 % GEL, Apply 4 g topically 4 times daily, Disp: , Rfl:     polycarbophil (FIBERCON) 625 MG tablet, Take 625 mg by mouth 2 times daily, Disp: , Rfl:   No Known Allergies    Past Medical History:   Diagnosis Date    CAD (coronary artery disease)     [facility]     Diverticular disease of colon     DJD (degenerative joint disease), multiple sites     GERD (gastroesophageal reflux disease)     Goiter     H/O thyroid nodule     Hx of malignant neoplasm of prostate     Hyperlipidemia     Hypertension     Hypothyroidism      Past Surgical History:   Procedure Laterality Date    CAROTID ENDARTERECTOMY  2017    CHOLECYSTECTOMY      COLONOSCOPY      CORONARY ARTERY BYPASS GRAFT  10/11/2001    HERNIA REPAIR      SKIN CANCER EXCISION      MELONOMA    THYROID SURGERY  2017    BX    TOTAL KNEE ARTHROPLASTY Left 2018     Family History   Problem Relation Age of Onset    Diabetes Mother     Cancer Mother     Heart Attack Father     Coronary Art Dis Father      Social History     Socioeconomic History    Marital status:      Spouse name: Not on file    Number of children: Not on file    Years of education: Not on file    Highest education level: Not on file   Occupational History    Not on file   Social Needs    Financial resource strain: Not on file    Food insecurity     Worry: Not on file     Inability: Not on file    Transportation needs     Medical: Not on file     Non-medical: Not on file   Tobacco Use    Smoking status: Former Smoker     Packs/day: 1.00     Years: 5.00     Pack years: 5.00     Quit date: 1984     Years since quittin.0    Smokeless tobacco: Never Used Substance and Sexual Activity    Alcohol use: Never     Frequency: Never    Drug use: Never    Sexual activity: Not Currently   Lifestyle    Physical activity     Days per week: Not on file     Minutes per session: Not on file    Stress: Not on file   Relationships    Social connections     Talks on phone: Not on file     Gets together: Not on file     Attends Voodoo service: Not on file     Active member of club or organization: Not on file     Attends meetings of clubs or organizations: Not on file     Relationship status: Not on file    Intimate partner violence     Fear of current or ex partner: Not on file     Emotionally abused: Not on file     Physically abused: Not on file     Forced sexual activity: Not on file   Other Topics Concern    Not on file   Social History Narrative    Not on file       Vitals:    02/11/21 0907   BP: 130/80   Site: Right Upper Arm   Position: Sitting   Cuff Size: Large Adult   Pulse: 80   Resp: 16   Temp: 96.9 °F (36.1 °C)   TempSrc: Infrared   SpO2: 97%   Weight: 244 lb (110.7 kg)   Height: 5' 9\" (1.753 m)       Physical Exam  Vitals signs and nursing note reviewed. Constitutional:       Appearance: Normal appearance. HENT:      Head: Normocephalic. Right Ear: Tympanic membrane and ear canal normal. There is no impacted cerumen. Left Ear: Tympanic membrane and ear canal normal. There is no impacted cerumen. Nose: Nose normal.      Mouth/Throat:      Mouth: Mucous membranes are dry. Eyes:      Extraocular Movements: Extraocular movements intact. Pupils: Pupils are equal, round, and reactive to light. Neck:      Musculoskeletal: No neck rigidity or muscular tenderness. Vascular: No carotid bruit. Cardiovascular:      Rate and Rhythm: Normal rate and regular rhythm. Pulses: Normal pulses. Heart sounds: Normal heart sounds. No murmur. No friction rub. No gallop.     Pulmonary:      Effort: Pulmonary effort is normal. No respiratory distress. Breath sounds: Normal breath sounds. No stridor. No wheezing, rhonchi or rales. Chest:      Chest wall: No tenderness. Abdominal:      General: Bowel sounds are normal. There is no distension. Palpations: Abdomen is soft. Musculoskeletal:         General: No swelling, tenderness, deformity or signs of injury. Right lower leg: No edema. Left lower leg: No edema. Lymphadenopathy:      Cervical: No cervical adenopathy. Skin:     General: Skin is warm and dry. Capillary Refill: Capillary refill takes 2 to 3 seconds. Findings: No bruising, lesion or rash. Neurological:      General: No focal deficit present. Mental Status: He is alert and oriented to person, place, and time. Motor: No weakness. Gait: Gait normal.   Psychiatric:         Attention and Perception: Attention normal.         Mood and Affect: Mood normal.         Behavior: Behavior normal.         Thought Content: Thought content does not include homicidal or suicidal ideation. Thought content does not include homicidal or suicidal plan. Assessment and Plan:  Bay Trevino was seen today for medication refill, 6 month follow-up and diarrhea. Diagnoses and all orders for this visit:    Type 2 diabetes mellitus with stage 3a chronic kidney disease, with long-term current use of insulin ([facility])  -      DIABETES FOOT EXAM  -     CBC Auto Differential; Future  -     Comprehensive Metabolic Panel, Fasting; Future  -     Hemoglobin A1C; Future  -     Microalbumin, Ur; Future  -     Urinalysis; Future    Diverticular disease of colon    Primary osteoarthritis involving multiple joints    Goiter    Hypothyroidism, unspecified type    Hx of malignant neoplasm of prostate    H/O thyroid nodule    BMI 36.0-36.9,adult    Rosacea    Vitamin D deficiency    Familial hypercholesterolemia  -     Lipid Panel;  Future    Coronary artery disease involving native coronary artery of native heart adults aged 25 years or older for alcohol misuse and provide persons engaged in risky or hazardous drinking with brief behavioral counseling interventions to reduce alcohol misuse. (BMI 36.03)  Obesity: Screening for and Management of-- All Adults  Grade: B(Recommended) recommends screening all adults for obesity. Clinicians should offer or refer patients with a body mass index (BMI) of 30 kg/m2 or higher to intensive, multicomponent behavioral interventions. (Not a fall risk)  Fall Prevention -- Exercise/Physical Therapy: Community-dwelling Adults 72 Years or Older, Increased Risk for Falls   Grade: B (Recommended) recommends exercise or physical therapy to prevent falls in community-dwelling adults aged 72 years or older who are at increased risk for falls. (No new symptoms noted or reported today)  Depression: Screening -- General adult population, including pregnant and postpartum women  Grade: B(Recommended) recommends screening for depression in the general adult population,  Screening should be implemented with adequate systems in place to ensure accurate diagnosis, effective treatment, and appropriate follow-up. (2020) Glaucoma: Screening - Adults and Diabetic Eye Exam     (TSH 4.65) Thyroid Dysfunction: Screening --      (  ) Prostate Cancer: Prostate-Specific Antigen (PSA)-Based Screening -- All Men    PSA 3.48 12/09 2020     (  ) Vitamin D Deficiency: Screening --      (2020) Skin Cancer: Screening --Asymptomatic adults Grade:  I(Uncertain)     Visual inspection:  Deformity/amputation: absent  Skin lesions/pre-ulcerative calluses: absent  Edema: right- negative, left- negative    Sensory exam:  Monofilament sensation: decrease  (minimum of 5 random plantar locations tested, avoiding callused areas - > 1 area with absence of sensation is + for neuropathy)      Plus at least one of the following:  Pulses: normal,   Pinprick: diminished      Educational materials and/or home exercises printed for patient's review and were included in patient instructions on his/her After Visit Summary and given to patient at the end of visit. Counseled regarding above diagnosis, including possible risks and complications,  especially if left uncontrolled. Counseled regarding the possible side effects, risks, benefits and alternatives to treatment; patient and/or guardian verbalizes understanding, agrees, feels comfortable with and wishes to proceed with above treatment plan. Advised patient to call with any new medication issues, and read all Rx info from pharmacy to assure aware of all possible risks and side effects of medication before taking. Reviewed age and gender appropriate health screening exams and vaccinations. Advised patient regarding importance of keeping up with recommended health maintenance and to schedule as soon as possible if overdue, as this is important in assessing for undiagnosed pathology, especially cancer, as well as protecting against potentially harmful/life threatening disease. Patient and/or guardian verbalizes understanding and agrees with above counseling, assessment and plan. All questions answered.      Seen By:  VERENICE George - CNP

## 2021-03-09 DIAGNOSIS — Z79.4 TYPE 2 DIABETES MELLITUS WITHOUT COMPLICATION, WITH LONG-TERM CURRENT USE OF INSULIN (HCC): Primary | ICD-10-CM

## 2021-03-09 DIAGNOSIS — E11.9 TYPE 2 DIABETES MELLITUS WITHOUT COMPLICATION, WITH LONG-TERM CURRENT USE OF INSULIN (HCC): Primary | ICD-10-CM

## 2021-03-09 RX ORDER — BLOOD SUGAR DIAGNOSTIC
1 STRIP MISCELLANEOUS 4 TIMES DAILY
Qty: 400 EACH | Refills: 1 | Status: SHIPPED
Start: 2021-03-09 | End: 2021-03-17 | Stop reason: SDUPTHER

## 2021-03-17 DIAGNOSIS — Z79.4 TYPE 2 DIABETES MELLITUS WITHOUT COMPLICATION, WITH LONG-TERM CURRENT USE OF INSULIN (HCC): ICD-10-CM

## 2021-03-17 DIAGNOSIS — E11.9 TYPE 2 DIABETES MELLITUS WITHOUT COMPLICATION, WITH LONG-TERM CURRENT USE OF INSULIN (HCC): ICD-10-CM

## 2021-03-17 RX ORDER — BLOOD SUGAR DIAGNOSTIC
1 STRIP MISCELLANEOUS 2 TIMES DAILY
Qty: 100 STRIP | Refills: 5 | Status: SHIPPED
Start: 2021-03-17 | End: 2022-02-02 | Stop reason: SDUPTHER

## 2021-03-24 ENCOUNTER — TELEPHONE (OUTPATIENT)
Dept: FAMILY MEDICINE CLINIC | Age: 76
End: 2021-03-24

## 2021-03-29 RX ORDER — ISOSORBIDE MONONITRATE 60 MG/1
60 TABLET, EXTENDED RELEASE ORAL 2 TIMES DAILY
Qty: 180 TABLET | Refills: 1 | Status: ON HOLD
Start: 2021-03-29 | End: 2021-06-19 | Stop reason: SDUPTHER

## 2021-04-21 RX ORDER — OMEPRAZOLE 20 MG/1
CAPSULE, DELAYED RELEASE ORAL
Qty: 90 CAPSULE | Refills: 1 | Status: SHIPPED
Start: 2021-04-21 | End: 2021-12-27

## 2021-04-21 RX ORDER — CARVEDILOL 25 MG/1
25 TABLET ORAL 2 TIMES DAILY WITH MEALS
Qty: 180 TABLET | Refills: 1 | Status: SHIPPED
Start: 2021-04-21 | End: 2021-12-27

## 2021-06-01 LAB
LEFT VENTRICULAR EJECTION FRACTION MODE: NORMAL
LEFT VENTRICULAR EJECTION FRACTION MODE: NORMAL
LV EF: 40 %
LV EF: 43 %

## 2021-06-03 RX ORDER — RANOLAZINE 1000 MG/1
1000 TABLET, EXTENDED RELEASE ORAL 2 TIMES DAILY
Qty: 180 TABLET | Refills: 1 | Status: SHIPPED
Start: 2021-06-03 | End: 2021-12-13

## 2021-06-14 DIAGNOSIS — E78.01 FAMILIAL HYPERCHOLESTEROLEMIA: ICD-10-CM

## 2021-06-14 DIAGNOSIS — N18.31 TYPE 2 DIABETES MELLITUS WITH STAGE 3A CHRONIC KIDNEY DISEASE, WITH LONG-TERM CURRENT USE OF INSULIN (HCC): ICD-10-CM

## 2021-06-14 DIAGNOSIS — Z11.59 ENCOUNTER FOR HEPATITIS C SCREENING TEST FOR LOW RISK PATIENT: ICD-10-CM

## 2021-06-14 DIAGNOSIS — E11.22 TYPE 2 DIABETES MELLITUS WITH STAGE 3A CHRONIC KIDNEY DISEASE, WITH LONG-TERM CURRENT USE OF INSULIN (HCC): ICD-10-CM

## 2021-06-14 DIAGNOSIS — Z79.4 TYPE 2 DIABETES MELLITUS WITH STAGE 3A CHRONIC KIDNEY DISEASE, WITH LONG-TERM CURRENT USE OF INSULIN (HCC): ICD-10-CM

## 2021-06-14 DIAGNOSIS — I10 ESSENTIAL HYPERTENSION: ICD-10-CM

## 2021-06-14 LAB
ALBUMIN SERPL-MCNC: 3.6 G/DL (ref 3.5–5.2)
ALP BLD-CCNC: 69 U/L (ref 40–129)
ALT SERPL-CCNC: 17 U/L (ref 0–40)
ANION GAP SERPL CALCULATED.3IONS-SCNC: 12 MMOL/L (ref 7–16)
APTT: 31.6 SEC (ref 24.5–35.1)
AST SERPL-CCNC: 19 U/L (ref 0–39)
BACTERIA: ABNORMAL /HPF
BASOPHILS ABSOLUTE: 0.03 E9/L (ref 0–0.2)
BASOPHILS RELATIVE PERCENT: 0.5 % (ref 0–2)
BILIRUB SERPL-MCNC: 0.6 MG/DL (ref 0–1.2)
BILIRUBIN URINE: NEGATIVE
BLOOD, URINE: NEGATIVE
BUN BLDV-MCNC: 15 MG/DL (ref 6–23)
CALCIUM SERPL-MCNC: 8.6 MG/DL (ref 8.6–10.2)
CHLORIDE BLD-SCNC: 103 MMOL/L (ref 98–107)
CHOLESTEROL, TOTAL: 177 MG/DL (ref 0–199)
CLARITY: CLEAR
CO2: 23 MMOL/L (ref 22–29)
COLOR: YELLOW
CREAT SERPL-MCNC: 1.2 MG/DL (ref 0.7–1.2)
CREATININE URINE: 72 MG/DL (ref 40–278)
EOSINOPHILS ABSOLUTE: 0.42 E9/L (ref 0.05–0.5)
EOSINOPHILS RELATIVE PERCENT: 6.6 % (ref 0–6)
GFR AFRICAN AMERICAN: >60
GFR NON-AFRICAN AMERICAN: 59 ML/MIN/1.73
GLUCOSE FASTING: 117 MG/DL (ref 74–99)
GLUCOSE URINE: NEGATIVE MG/DL
HBA1C MFR BLD: 6.8 % (ref 4–5.6)
HCT VFR BLD CALC: 40.9 % (ref 37–54)
HDLC SERPL-MCNC: 32 MG/DL
HEMOGLOBIN: 13.4 G/DL (ref 12.5–16.5)
IMMATURE GRANULOCYTES #: 0.02 E9/L
IMMATURE GRANULOCYTES %: 0.3 % (ref 0–5)
INR BLD: 1.1
KETONES, URINE: NEGATIVE MG/DL
LDL CHOLESTEROL CALCULATED: 101 MG/DL (ref 0–99)
LEUKOCYTE ESTERASE, URINE: NEGATIVE
LYMPHOCYTES ABSOLUTE: 1.68 E9/L (ref 1.5–4)
LYMPHOCYTES RELATIVE PERCENT: 26.3 % (ref 20–42)
MAGNESIUM: 2 MG/DL (ref 1.6–2.6)
MCH RBC QN AUTO: 30 PG (ref 26–35)
MCHC RBC AUTO-ENTMCNC: 32.8 % (ref 32–34.5)
MCV RBC AUTO: 91.7 FL (ref 80–99.9)
MICROALBUMIN UR-MCNC: 1100 MG/L
MONOCYTES ABSOLUTE: 0.53 E9/L (ref 0.1–0.95)
MONOCYTES RELATIVE PERCENT: 8.3 % (ref 2–12)
NEUTROPHILS ABSOLUTE: 3.71 E9/L (ref 1.8–7.3)
NEUTROPHILS RELATIVE PERCENT: 58 % (ref 43–80)
NITRITE, URINE: NEGATIVE
PDW BLD-RTO: 12.7 FL (ref 11.5–15)
PH UA: 7 (ref 5–9)
PHOSPHORUS: 3.7 MG/DL (ref 2.5–4.5)
PLATELET # BLD: 141 E9/L (ref 130–450)
PMV BLD AUTO: 12.6 FL (ref 7–12)
POTASSIUM SERPL-SCNC: 4.4 MMOL/L (ref 3.5–5)
PROTEIN PROTEIN: 159 MG/DL (ref 0–12)
PROTEIN UA: 100 MG/DL
PROTEIN/CREAT RATIO: 2.2
PROTEIN/CREAT RATIO: 2.2 (ref 0–0.2)
PROTHROMBIN TIME: 12 SEC (ref 9.3–12.4)
RBC # BLD: 4.46 E12/L (ref 3.8–5.8)
RBC UA: ABNORMAL /HPF (ref 0–2)
SODIUM BLD-SCNC: 138 MMOL/L (ref 132–146)
SPECIFIC GRAVITY UA: 1.02 (ref 1–1.03)
T4 FREE: 1.08 NG/DL (ref 0.93–1.7)
TOTAL PROTEIN: 6.4 G/DL (ref 6.4–8.3)
TRIGL SERPL-MCNC: 222 MG/DL (ref 0–149)
TSH SERPL DL<=0.05 MIU/L-ACNC: 3.66 UIU/ML (ref 0.27–4.2)
URIC ACID, SERUM: 6.9 MG/DL (ref 3.4–7)
UROBILINOGEN, URINE: 0.2 E.U./DL
VITAMIN D 25-HYDROXY: 30 NG/ML (ref 30–100)
VLDLC SERPL CALC-MCNC: 44 MG/DL
WBC # BLD: 6.4 E9/L (ref 4.5–11.5)
WBC UA: ABNORMAL /HPF (ref 0–5)

## 2021-06-15 ENCOUNTER — TELEPHONE (OUTPATIENT)
Dept: FAMILY MEDICINE CLINIC | Age: 76
End: 2021-06-15

## 2021-06-15 LAB — HEPATITIS C ANTIBODY INTERPRETATION: NORMAL

## 2021-06-15 NOTE — TELEPHONE ENCOUNTER
Per patient he had a lot of blood work done for Dr. Flavio Hsieh and Harper Sanchez so he does not need Bw here on 08- it will be too soon.  He will keep his appt with Rom on 08-

## 2021-06-16 ENCOUNTER — TELEPHONE (OUTPATIENT)
Dept: FAMILY MEDICINE CLINIC | Age: 76
End: 2021-06-16

## 2021-06-16 ENCOUNTER — TELEPHONE (OUTPATIENT)
Dept: CARDIAC CATH/INVASIVE PROCEDURES | Age: 76
End: 2021-06-16

## 2021-06-16 NOTE — TELEPHONE ENCOUNTER
Reminded patient of scheduled procedure on 6/17/21. Instructions given and COVID questionnaire completed. Patient has had COVID vaccine and will bring the card.

## 2021-06-16 NOTE — TELEPHONE ENCOUNTER
Yesy asking for Penny Redmond most recent labs, he will be having a test tomorrow. Fax reports to [address] [place] [address] [employer] at [phone]. Done.

## 2021-06-17 ENCOUNTER — HOSPITAL ENCOUNTER (INPATIENT)
Dept: CARDIAC CATH/INVASIVE PROCEDURES | Age: 76
LOS: 1 days | Discharge: HOME | DRG: 247 | End: 2021-06-19
Attending: INTERNAL MEDICINE | Admitting: INTERNAL MEDICINE
Payer: MEDICARE

## 2021-06-17 DIAGNOSIS — I25.10 CAD IN NATIVE ARTERY: ICD-10-CM

## 2021-06-17 LAB
ABO/RH: NORMAL
ANTIBODY SCREEN: NORMAL
Lab: NORMAL
METER GLUCOSE: 121 MG/DL (ref 74–99)
METER GLUCOSE: 92 MG/DL (ref 74–99)
METER GLUCOSE: 99 MG/DL (ref 74–99)
POC ACT LR: 318 SECONDS
REPORT: NORMAL
THIS TEST SENT TO: NORMAL

## 2021-06-17 PROCEDURE — C1887 CATHETER, GUIDING: HCPCS

## 2021-06-17 PROCEDURE — 93005 ELECTROCARDIOGRAM TRACING: CPT | Performed by: INTERNAL MEDICINE

## 2021-06-17 PROCEDURE — 92928 PRQ TCAT PLMT NTRAC ST 1 LES: CPT | Performed by: INTERNAL MEDICINE

## 2021-06-17 PROCEDURE — C1874 STENT, COATED/COV W/DEL SYS: HCPCS

## 2021-06-17 PROCEDURE — 93459 L HRT ART/GRFT ANGIO: CPT | Performed by: INTERNAL MEDICINE

## 2021-06-17 PROCEDURE — 6360000002 HC RX W HCPCS

## 2021-06-17 PROCEDURE — G0378 HOSPITAL OBSERVATION PER HR: HCPCS

## 2021-06-17 PROCEDURE — 6370000000 HC RX 637 (ALT 250 FOR IP): Performed by: INTERNAL MEDICINE

## 2021-06-17 PROCEDURE — C1894 INTRO/SHEATH, NON-LASER: HCPCS

## 2021-06-17 PROCEDURE — 2580000003 HC RX 258: Performed by: INTERNAL MEDICINE

## 2021-06-17 PROCEDURE — C9600 PERC DRUG-EL COR STENT SING: HCPCS

## 2021-06-17 PROCEDURE — 2709999900 HC NON-CHARGEABLE SUPPLY

## 2021-06-17 PROCEDURE — C1725 CATH, TRANSLUMIN NON-LASER: HCPCS

## 2021-06-17 PROCEDURE — 6370000000 HC RX 637 (ALT 250 FOR IP)

## 2021-06-17 PROCEDURE — 93458 L HRT ARTERY/VENTRICLE ANGIO: CPT

## 2021-06-17 PROCEDURE — 36415 COLL VENOUS BLD VENIPUNCTURE: CPT

## 2021-06-17 PROCEDURE — 85347 COAGULATION TIME ACTIVATED: CPT

## 2021-06-17 PROCEDURE — C1769 GUIDE WIRE: HCPCS

## 2021-06-17 PROCEDURE — 86900 BLOOD TYPING SEROLOGIC ABO: CPT

## 2021-06-17 PROCEDURE — G0379 DIRECT REFER HOSPITAL OBSERV: HCPCS

## 2021-06-17 PROCEDURE — 2500000003 HC RX 250 WO HCPCS

## 2021-06-17 PROCEDURE — 82962 GLUCOSE BLOOD TEST: CPT

## 2021-06-17 PROCEDURE — 6370000000 HC RX 637 (ALT 250 FOR IP): Performed by: FAMILY MEDICINE

## 2021-06-17 PROCEDURE — 86850 RBC ANTIBODY SCREEN: CPT

## 2021-06-17 PROCEDURE — 86901 BLOOD TYPING SEROLOGIC RH(D): CPT

## 2021-06-17 RX ORDER — ACETAMINOPHEN 325 MG/1
650 TABLET ORAL EVERY 4 HOURS PRN
Status: DISCONTINUED | OUTPATIENT
Start: 2021-06-17 | End: 2021-06-18 | Stop reason: SDUPTHER

## 2021-06-17 RX ORDER — RANOLAZINE 500 MG/1
1000 TABLET, EXTENDED RELEASE ORAL 2 TIMES DAILY
Status: DISCONTINUED | OUTPATIENT
Start: 2021-06-17 | End: 2021-06-19 | Stop reason: HOSPADM

## 2021-06-17 RX ORDER — NICOTINE POLACRILEX 4 MG
15 LOZENGE BUCCAL PRN
Status: DISCONTINUED | OUTPATIENT
Start: 2021-06-17 | End: 2021-06-19 | Stop reason: HOSPADM

## 2021-06-17 RX ORDER — TAMSULOSIN HYDROCHLORIDE 0.4 MG/1
0.4 CAPSULE ORAL DAILY
Status: DISCONTINUED | OUTPATIENT
Start: 2021-06-17 | End: 2021-06-19 | Stop reason: HOSPADM

## 2021-06-17 RX ORDER — SODIUM CHLORIDE 9 MG/ML
INJECTION, SOLUTION INTRAVENOUS CONTINUOUS
Status: DISCONTINUED | OUTPATIENT
Start: 2021-06-17 | End: 2021-06-18 | Stop reason: ALTCHOICE

## 2021-06-17 RX ORDER — PANTOPRAZOLE SODIUM 40 MG/1
40 TABLET, DELAYED RELEASE ORAL
Status: DISCONTINUED | OUTPATIENT
Start: 2021-06-18 | End: 2021-06-19 | Stop reason: HOSPADM

## 2021-06-17 RX ORDER — ATORVASTATIN CALCIUM 40 MG/1
40 TABLET, FILM COATED ORAL NIGHTLY
Status: DISCONTINUED | OUTPATIENT
Start: 2021-06-17 | End: 2021-06-17

## 2021-06-17 RX ORDER — RANOLAZINE 500 MG/1
500 TABLET, EXTENDED RELEASE ORAL 2 TIMES DAILY
Status: DISCONTINUED | OUTPATIENT
Start: 2021-06-17 | End: 2021-06-17 | Stop reason: ALTCHOICE

## 2021-06-17 RX ORDER — CARVEDILOL 25 MG/1
25 TABLET ORAL 2 TIMES DAILY WITH MEALS
Status: DISCONTINUED | OUTPATIENT
Start: 2021-06-17 | End: 2021-06-19 | Stop reason: HOSPADM

## 2021-06-17 RX ORDER — NITROGLYCERIN 0.4 MG/1
0.4 TABLET SUBLINGUAL EVERY 5 MIN PRN
Status: DISCONTINUED | OUTPATIENT
Start: 2021-06-17 | End: 2021-06-19 | Stop reason: HOSPADM

## 2021-06-17 RX ORDER — SODIUM CHLORIDE 9 MG/ML
INJECTION, SOLUTION INTRAVENOUS ONCE
Status: COMPLETED | OUTPATIENT
Start: 2021-06-17 | End: 2021-06-17

## 2021-06-17 RX ORDER — ZINC SULFATE 50(220)MG
50 CAPSULE ORAL DAILY
Status: DISCONTINUED | OUTPATIENT
Start: 2021-06-17 | End: 2021-06-19 | Stop reason: HOSPADM

## 2021-06-17 RX ORDER — SODIUM CHLORIDE 0.9 % (FLUSH) 0.9 %
5-40 SYRINGE (ML) INJECTION EVERY 12 HOURS SCHEDULED
Status: DISCONTINUED | OUTPATIENT
Start: 2021-06-17 | End: 2021-06-19 | Stop reason: HOSPADM

## 2021-06-17 RX ORDER — ISOSORBIDE MONONITRATE 60 MG/1
60 TABLET, EXTENDED RELEASE ORAL 2 TIMES DAILY
Status: COMPLETED | OUTPATIENT
Start: 2021-06-17 | End: 2021-06-17

## 2021-06-17 RX ORDER — ISOSORBIDE MONONITRATE 60 MG/1
60 TABLET, EXTENDED RELEASE ORAL DAILY
Status: DISCONTINUED | OUTPATIENT
Start: 2021-06-17 | End: 2021-06-18

## 2021-06-17 RX ORDER — SODIUM CHLORIDE 9 MG/ML
INJECTION, SOLUTION INTRAVENOUS CONTINUOUS
Status: DISCONTINUED | OUTPATIENT
Start: 2021-06-17 | End: 2021-06-17 | Stop reason: SDUPTHER

## 2021-06-17 RX ORDER — ASPIRIN 81 MG/1
81 TABLET ORAL 2 TIMES DAILY
Status: COMPLETED | OUTPATIENT
Start: 2021-06-17 | End: 2021-06-17

## 2021-06-17 RX ORDER — ROSUVASTATIN CALCIUM 20 MG/1
10 TABLET, COATED ORAL DAILY
Status: DISCONTINUED | OUTPATIENT
Start: 2021-06-18 | End: 2021-06-19 | Stop reason: HOSPADM

## 2021-06-17 RX ORDER — ASPIRIN 81 MG/1
81 TABLET ORAL DAILY
Status: DISCONTINUED | OUTPATIENT
Start: 2021-06-18 | End: 2021-06-19 | Stop reason: HOSPADM

## 2021-06-17 RX ORDER — SODIUM CHLORIDE 9 MG/ML
25 INJECTION, SOLUTION INTRAVENOUS PRN
Status: DISCONTINUED | OUTPATIENT
Start: 2021-06-17 | End: 2021-06-19 | Stop reason: HOSPADM

## 2021-06-17 RX ORDER — ASCORBIC ACID 500 MG
1000 TABLET ORAL DAILY
Status: DISCONTINUED | OUTPATIENT
Start: 2021-06-17 | End: 2021-06-19 | Stop reason: HOSPADM

## 2021-06-17 RX ORDER — DEXTROSE MONOHYDRATE 50 MG/ML
100 INJECTION, SOLUTION INTRAVENOUS PRN
Status: DISCONTINUED | OUTPATIENT
Start: 2021-06-17 | End: 2021-06-19 | Stop reason: HOSPADM

## 2021-06-17 RX ORDER — SODIUM CHLORIDE 0.9 % (FLUSH) 0.9 %
5-40 SYRINGE (ML) INJECTION PRN
Status: DISCONTINUED | OUTPATIENT
Start: 2021-06-17 | End: 2021-06-19 | Stop reason: HOSPADM

## 2021-06-17 RX ORDER — DEXTROSE MONOHYDRATE 25 G/50ML
12.5 INJECTION, SOLUTION INTRAVENOUS PRN
Status: DISCONTINUED | OUTPATIENT
Start: 2021-06-17 | End: 2021-06-19 | Stop reason: HOSPADM

## 2021-06-17 RX ORDER — LEVOTHYROXINE SODIUM 0.03 MG/1
25 TABLET ORAL DAILY
Status: DISCONTINUED | OUTPATIENT
Start: 2021-06-18 | End: 2021-06-19 | Stop reason: HOSPADM

## 2021-06-17 RX ORDER — ATORVASTATIN CALCIUM 80 MG/1
80 TABLET, FILM COATED ORAL NIGHTLY
Status: DISCONTINUED | OUTPATIENT
Start: 2021-06-17 | End: 2021-06-17 | Stop reason: ALTCHOICE

## 2021-06-17 RX ADMIN — TAMSULOSIN HYDROCHLORIDE 0.4 MG: 0.4 CAPSULE ORAL at 18:10

## 2021-06-17 RX ADMIN — Medication 10 ML: at 21:16

## 2021-06-17 RX ADMIN — RANOLAZINE 1000 MG: 500 TABLET, FILM COATED, EXTENDED RELEASE ORAL at 21:15

## 2021-06-17 RX ADMIN — Medication 800 MG: at 21:15

## 2021-06-17 RX ADMIN — SODIUM CHLORIDE: 9 INJECTION, SOLUTION INTRAVENOUS at 07:05

## 2021-06-17 RX ADMIN — SODIUM CHLORIDE, PRESERVATIVE FREE 10 ML: 5 INJECTION INTRAVENOUS at 16:21

## 2021-06-17 RX ADMIN — ISOSORBIDE MONONITRATE 60 MG: 60 TABLET ORAL at 21:15

## 2021-06-17 RX ADMIN — CARVEDILOL 25 MG: 25 TABLET, FILM COATED ORAL at 18:10

## 2021-06-17 RX ADMIN — SODIUM CHLORIDE: 9 INJECTION, SOLUTION INTRAVENOUS at 16:20

## 2021-06-17 RX ADMIN — ASPIRIN 81 MG: 81 TABLET, COATED ORAL at 21:14

## 2021-06-17 RX ADMIN — TICAGRELOR 90 MG: 90 TABLET ORAL at 21:15

## 2021-06-17 ASSESSMENT — PAIN SCALES - GENERAL
PAINLEVEL_OUTOF10: 0

## 2021-06-17 NOTE — PROCEDURES
[address]                  [address] [facility],  [facility]                            CARDIAC CATHETERIZATION    PATIENT NAME: Jessica Kramer                   :        1945  MED REC NO:   [id number]                            ROOM:  ACCOUNT NO:   [de-identified]                           ADMIT DATE: 2021  PROVIDER:     Audra Oliveros MD    DATE OF PROCEDURE:  2021    PROCEDURE:  Left heart catheterization. INDICATION:  New-onset angina with history of CABG. REFERRING PHYSICIAN:  Dr. Lis Navarro:  After obtaining a signed consent from the  patient, he was brought to the cardiac cath lab. Under sterile  condition, under local anesthetic, and using conscious sedation, a  6-Brazilian sheath was inserted into the right common femoral artery using  a 5-Brazilian micropuncture needle under ultrasound guidance. Then a  5-Brazilian JL-4 diagnostic catheter was advanced through the ascending  aorta without difficulty. The left main coronary artery was engaged and  a coronary angiogram was done. This catheter was exchanged over a  guidewire to a 5-Brazilian JR-4 diagnostic catheter, which was used to  engage the right coronary artery and an angiogram was done. The same  catheter was used to engage the SVG attached to diagonal branch and an  angiogram was done. Same catheter was used to engage the SVG attached  to the obtuse marginal branch and angiogram was done. This catheter was  exchanged over a guidewire to a 5-Brazilian LIMA catheter, which was  advanced into the left subclavian artery and the LIMA was engaged and an  angiogram was done. This catheter was exchanged over a guidewire to a  5-Brazilian pigtail, which was advanced into the left ventricle without  difficulty. The left ventricular end-diastolic pressure was measured. Left ventriculogram was done. There was no significant gradient across  the aortic valve. at the site of the  stenosis. Then I could not deliver a 2.0 x 12 Resolute Farhat stent due  to the calcified mid RCA. Then a BMW wire was used as a alfonzo wire and  the stent was delivered to the site of the stenosis and was deployed at  12 atmospheres with 0% residual stenosis and LUBNA-3 flow. At the end of  the intervention, the wire was pulled out. The guide catheter was  pulled out. The patient received two tablets of crushed Brilinta. His  ACT was 318. The right groin sheath was sutured in place and will be  taken out when the ACT is below 175. IMPRESSION:  1. Severe triple-vessel CAD. 2.  Patent LIMA to LAD, but severe diffuse LAD stenosis beyond the LIMA. 3.  SVG to obtuse marginal branch has severe ostial proximal stenosis  and very severe motnelqa-xm-pek stenosis with LUBNA-2 flow distally. 4.  Occluded SVG to diagonal branch. 5.  Status post PCI to the first PLV branch with a drug-eluting stent. RECOMMENDATIONS:  1.  Start DAPT. 2.  Aggressive medical therapy. 3.  Staged PCI of the SVG to OM in a.m.  4.  Cardiac rehab post hospital discharge. PROCEDURE START TIME:  07:49 a.m. PROCEDURE END TIME:  08:59 a.m. FLUOROSCOPY TIME:  18.6 minutes. CONTRAST VOLUME: 176 mL of Isovue. CONSCIOUS SEDATION:  1 mg of intravenous Versed and 100 mcg of  intravenous fentanyl.         Jojo Fiore MD    D: 06/17/2021 9:28:47       T: 06/17/2021 9:36:37     [email or web address]  Job#: [id number]     Doc#: [id number]    CC:

## 2021-06-17 NOTE — CONSULTS
[facility] [address] [place] [address] CARDIOLOGY    Name: Joby Weiner    Age: 67 y.o. Date of Admission: 6/17/2021  6:08 AM    Date of Service: 6/17/2021    Reason for Consultation: CAD post PCI    Referring Physician: Sherren Batman    History of Present Illness: The patient is a 67y.o. year old male electively admitted with markedly worsening chest discomfort and dyspnea on exertion that resolves with rest despite a normal Lexiscan nuclear last year. Underwent heart catheterization and had stenting to a moderate-sized posterolateral branch. The vein graft to his obtuse marginal was severely diseased and plans are in place for him to have PCI of this vessel tomorrow. LAD distal to the LIMA insertion was also diseased. Currently in stretcher in holding area and feeling well. Past Medical History:   Hypertension, hyperlipidemia, insulin requiring diabetes mellitus, CAD post CABG 2001 with LIMA-LAD, veins to obtuse marginal and diagonal coronary artery. Occluded vein graft to diagonal on previous heart catheterization and LEFTY placement non-bypassed RCA 2011. Normal Lexiscan nuclear stress testing March 2020. Review of Systems:     Constitutional: No fever, chills, sweats  Cardiac: As per HPI  Pulmonary: As per HPI  HEENT: No visual disturbances or difficult swallowing  GI: No nausea, vomiting, diarrhea, abdominal pain, rectal bleeding  : No dysuria or hematuria  Endocrine: No excessive thirst, heat or cold intolerance. Musculoskeletal: Joint pain but no muscle aches.  No claudication  Skin: No skin breakdown or rashes  Neuro: No headache, confusion, or seizures  Psych: No depression, anxiety      Family History:  Family History   Problem Relation Age of Onset    Diabetes Mother     Cancer Mother     Heart Attack Father     Coronary Art Dis Father        Social History:  Social History     Socioeconomic History    Marital status:      Spouse name: Not on file  Number of children: Not on file    Years of education: Not on file    Highest education level: Not on file   Occupational History    Not on file   Tobacco Use    Smoking status: Former Smoker     Packs/day: 1.00     Years: 5.00     Pack years: 5.00     Quit date: 1984     Years since quittin.4    Smokeless tobacco: Never Used   Vaping Use    Vaping Use: Never used   Substance and Sexual Activity    Alcohol use: Never    Drug use: Never    Sexual activity: Not Currently   Other Topics Concern    Not on file   Social History Narrative    Not on file     Social Determinants of Health     Financial Resource Strain:     Difficulty of Paying Living Expenses:    Food Insecurity:     Worried About Running Out of Food in the Last Year:     [address] in the Last Year:    Transportation Needs:     Lack of Transportation (Medical):  Lack of Transportation (Non-Medical):    Physical Activity:     Days of Exercise per Week:     Minutes of Exercise per Session:    Stress:     Feeling of Stress :    Social Connections:     Frequency of Communication with Friends and Family:     Frequency of Social Gatherings with Friends and Family:     Attends Voodoo Services:     Active Member of Clubs or Organizations:     Attends Club or Organization Meetings:     Marital Status:    Intimate Partner Violence:     Fear of Current or Ex-Partner:     Emotionally Abused:     Physically Abused:     Sexually Abused:         Allergies:  No Known Allergies    Current Medications:  Current Outpatient Medications   Medication Sig Dispense Refill    ranolazine (RANEXA) 1000 MG extended release tablet Take 1 tablet by mouth 2 times daily 180 tablet 1    carvedilol (COREG) 25 MG tablet TAKE 1 TABLET BY MOUTH 2  TIMES DAILY (WITH MEALS) 180 tablet 1    omeprazole (PRILOSEC) 20 MG delayed release capsule TAKE 1 CAPSULE ORALLY ONCE  PER DAY 90 capsule 1    isosorbide mononitrate (IMDUR) 60 MG extended release tablet Take 1 tablet by mouth 2 times daily 180 tablet 1    Insulin Degludec (TRESIBA FLEXTOUCH) 200 UNIT/ML SOPN Inject 55 Units into the skin daily (with breakfast)      levothyroxine (SYNTHROID) 25 MCG tablet Take 1 tablet by mouth daily 90 tablet 1    tamsulosin (FLOMAX) 0.4 MG capsule TAKE 1 CAPSULE BY MOUTH ONCE DAILY AT BEDTIME      insulin lispro (HUMALOG) 100 UNIT/ML injection vial Inject 1 Units into the skin 3 times daily (before meals) Sliding Scale as needed      polycarbophil (FIBERCON) 625 MG tablet Take 625 mg by mouth 2 times daily      Garlic 119 MG CAPS Take by mouth 2 times daily      Cholecalciferol (VITAMIN D3) 50 MCG (2000 UT) CAPS Take by mouth daily      Ascorbic Acid (VITAMIN C) 1000 MG tablet Take 1,000 mg by mouth daily      zinc sulfate (ZINCATE) 220 (50 Zn) MG capsule Take 220 mg by mouth daily      TURMERIC PO Take 500 capsules by mouth daily      Magnesium Oxide 400 MG CAPS Take 2 capsules by mouth 2 times daily       Omega-3 Fatty Acids (FISH OIL) 1000 MG CAPS Take 1,000 mg by mouth 2 CAPS IN THE AM 1 CAP IN PM       B Complex Vitamins (B COMPLEX 1 PO) Take by mouth daily      aspirin 81 MG tablet Take 81 mg by mouth 2 times daily      rosuvastatin (CRESTOR) 10 MG tablet Take 10 mg by mouth daily      Handicap Placard MISC by Does not apply route Valid  until Marach 24, 2026. 1 each 0    blood glucose test strips (ONETOUCH ULTRA) strip 1 each by Does not apply route 2 times daily 100 strip 5    canagliflozin (INVOKANA) 100 MG TABS tablet invokana  100 mg tabs      nitroGLYCERIN (NITROSTAT) 0.4 MG SL tablet Place 1 tablet under the tongue every 5 minutes as needed for Chest pain up to max of 3 total doses.  If no relief after 1 dose, call 911. 25 tablet 0    Microlet Lancets MISC 1 each by Does not apply route 4 times daily      diclofenac sodium (VOLTAREN) 1 % GEL Apply 4 g topically 4 times daily       Current Facility-Administered Medications Medication Dose Route Frequency Provider Last Rate Last Admin    0.9 % sodium chloride infusion   Intravenous Continuous Juany Alvarez MD        [START ON 6/18/2021] aspirin EC tablet 81 mg  81 mg Oral Daily Juany Alvarez MD        ticagrelor (BRILINTA) tablet 90 mg  90 mg Oral BID uJany Alvarez MD        atorvastatin (LIPITOR) tablet 40 mg  40 mg Oral Nightly Juany Alvarez MD        isosorbide mononitrate (IMDUR) extended release tablet 60 mg  60 mg Oral Daily Juany Alvarez MD        ranolazine (RANEXA) extended release tablet 500 mg  500 mg Oral BID Juany Alvarez MD          sodium chloride         Physical Exam:  BP (!) 178/110   Pulse 61   Temp 97.8 °F (36.6 °C) (Temporal)   Resp 18   Ht 5' 10\" (1.778 m)   Wt 250 lb (113.4 kg)   SpO2 99%   BMI 35.87 kg/m²   Weight change: Wt Readings from Last 3 Encounters:   06/17/21 250 lb (113.4 kg)   02/11/21 244 lb (110.7 kg)   12/11/20 253 lb (114.8 kg)         General: Awake, alert, oriented x3, no acute distress  HEENT: Unremarkable  Neck: No JVD or bruits. Cardiac: Regular rate and rhythm, normal S1 and S2, no extra heart sounds, murmurs, heaves, thrills  Resp: Lungs clear without wheezing or crackles. No accessory muscle use or retraction  Abdomen: soft, nontender, nondistended, no gross organomegaly or mass  Skin: Warm and dry, no cyanosis. Musculoskeletal: normal tone and strength in the upper and lower extremities bilaterally  Neuro: Grossly unremarkable  Psych: Cooperative, and normal affect    Intake/Output:  No intake or output data in the 24 hours ending 06/17/21 0952  No intake/output data recorded. Laboratory Tests:  Lab Results   Component Value Date    CREATININE 1.2 06/14/2021    BUN 15 06/14/2021     06/14/2021    K 4.4 06/14/2021     06/14/2021    CO2 23 06/14/2021     No results for input(s): CKTOTAL, CKMB in the last 72 hours.     Invalid input(s): TROPONONI  No results found for: BNP  Lab Results   Component Value Date WBC 6.4 06/14/2021    RBC 4.46 06/14/2021    HGB 13.4 06/14/2021    HCT 40.9 06/14/2021    MCV 91.7 06/14/2021    MCH 30.0 06/14/2021    MCHC 32.8 06/14/2021    RDW 12.7 06/14/2021     06/14/2021    MPV 12.6 06/14/2021     No results for input(s): ALKPHOS, ALT, AST, PROT, BILITOT, BILIDIR, LABALBU in the last 72 hours. Lab Results   Component Value Date    MG 2.0 06/14/2021     Lab Results   Component Value Date    PROTIME 12.0 06/14/2021    INR 1.1 06/14/2021     Lab Results   Component Value Date    TSH 3.660 06/14/2021     No components found for: CHLPL  Lab Results   Component Value Date    TRIG 222 (H) 06/14/2021    TRIG 295 (H) 02/08/2021    TRIG 318 (H) 09/04/2013     Lab Results   Component Value Date    HDL 32 06/14/2021    HDL 38 02/08/2021    HDL 38 07/31/2020     Lab Results   Component Value Date    LDLCALC 101 (H) 06/14/2021    LDLCALC 82 02/08/2021    LDLCALC 88 07/31/2020     Lab Results   Component Value Date    INR 1.1 06/14/2021       Admission ECG is currently pending. ASSESSMENT / PLAN:    #1.  Accelerating angina post PLV stenting-plans in place for stenting of vein graft to obtuse marginal tomorrow. Continue Ranexa at this time. Dual antiplatelet therapy for at least 1 year. Hydrate overnight and PCI tomorrow. N.p.o. after midnight except for medication. Ranexa increased. #2. Hypertension-blood pressure elevated at this time. Has not yet gotten carvedilol for hypertension, or isosorbide, so I have made no new antihypertensive changes. #3. Hyperlipidemia-LDL elevated at 101 and should be below 70. Increase atorvastatin to 80 mg nightly and at time of discharge he will require Crestor 20 mg nightly. #4.  Diabetes-per Dr. Loida Tipton. #5.  Continue to follow.     Electronically signed by Elidia Goncalves MD on 6/17/2021 at 9:52 AM

## 2021-06-18 ENCOUNTER — APPOINTMENT (OUTPATIENT)
Dept: GENERAL RADIOLOGY | Age: 76
DRG: 247 | End: 2021-06-18
Attending: INTERNAL MEDICINE
Payer: MEDICARE

## 2021-06-18 LAB
ANION GAP SERPL CALCULATED.3IONS-SCNC: 13 MMOL/L (ref 7–16)
BUN BLDV-MCNC: 13 MG/DL (ref 6–23)
CALCIUM SERPL-MCNC: 8.7 MG/DL (ref 8.6–10.2)
CHLORIDE BLD-SCNC: 103 MMOL/L (ref 98–107)
CHOLESTEROL, TOTAL: 166 MG/DL (ref 0–199)
CO2: 21 MMOL/L (ref 22–29)
CREAT SERPL-MCNC: 1.2 MG/DL (ref 0.7–1.2)
EKG ATRIAL RATE: 61 BPM
EKG ATRIAL RATE: 64 BPM
EKG P AXIS: 51 DEGREES
EKG P AXIS: 56 DEGREES
EKG P-R INTERVAL: 174 MS
EKG P-R INTERVAL: 196 MS
EKG Q-T INTERVAL: 458 MS
EKG Q-T INTERVAL: 470 MS
EKG QRS DURATION: 104 MS
EKG QRS DURATION: 96 MS
EKG QTC CALCULATION (BAZETT): 461 MS
EKG QTC CALCULATION (BAZETT): 484 MS
EKG R AXIS: -30 DEGREES
EKG R AXIS: -40 DEGREES
EKG T AXIS: 135 DEGREES
EKG T AXIS: 155 DEGREES
EKG VENTRICULAR RATE: 61 BPM
EKG VENTRICULAR RATE: 64 BPM
GFR AFRICAN AMERICAN: >60
GFR NON-AFRICAN AMERICAN: 59 ML/MIN/1.73
GLUCOSE BLD-MCNC: 89 MG/DL (ref 74–99)
HCT VFR BLD CALC: 39.2 % (ref 37–54)
HDLC SERPL-MCNC: 27 MG/DL
HEMOGLOBIN: 13.2 G/DL (ref 12.5–16.5)
LDL CHOLESTEROL CALCULATED: 88 MG/DL (ref 0–99)
MCH RBC QN AUTO: 29.9 PG (ref 26–35)
MCHC RBC AUTO-ENTMCNC: 33.7 % (ref 32–34.5)
MCV RBC AUTO: 88.9 FL (ref 80–99.9)
METER GLUCOSE: 148 MG/DL (ref 74–99)
METER GLUCOSE: 164 MG/DL (ref 74–99)
METER GLUCOSE: 93 MG/DL (ref 74–99)
METER GLUCOSE: 97 MG/DL (ref 74–99)
PDW BLD-RTO: 12.7 FL (ref 11.5–15)
PLATELET # BLD: 151 E9/L (ref 130–450)
PMV BLD AUTO: 12.1 FL (ref 7–12)
POC ACT LR: 177 SECONDS
POC ACT LR: 212 SECONDS
POC ACT LR: 342 SECONDS
POTASSIUM SERPL-SCNC: 3.9 MMOL/L (ref 3.5–5)
RBC # BLD: 4.41 E12/L (ref 3.8–5.8)
SODIUM BLD-SCNC: 137 MMOL/L (ref 132–146)
TRIGL SERPL-MCNC: 254 MG/DL (ref 0–149)
VLDLC SERPL CALC-MCNC: 51 MG/DL
WBC # BLD: 6.5 E9/L (ref 4.5–11.5)

## 2021-06-18 PROCEDURE — 93005 ELECTROCARDIOGRAM TRACING: CPT | Performed by: INTERNAL MEDICINE

## 2021-06-18 PROCEDURE — 6370000000 HC RX 637 (ALT 250 FOR IP): Performed by: INTERNAL MEDICINE

## 2021-06-18 PROCEDURE — 2500000003 HC RX 250 WO HCPCS

## 2021-06-18 PROCEDURE — 6360000002 HC RX W HCPCS

## 2021-06-18 PROCEDURE — 93010 ELECTROCARDIOGRAM REPORT: CPT | Performed by: INTERNAL MEDICINE

## 2021-06-18 PROCEDURE — C1769 GUIDE WIRE: HCPCS

## 2021-06-18 PROCEDURE — 85027 COMPLETE CBC AUTOMATED: CPT

## 2021-06-18 PROCEDURE — 71045 X-RAY EXAM CHEST 1 VIEW: CPT

## 2021-06-18 PROCEDURE — 80061 LIPID PANEL: CPT

## 2021-06-18 PROCEDURE — 80048 BASIC METABOLIC PNL TOTAL CA: CPT

## 2021-06-18 PROCEDURE — G0378 HOSPITAL OBSERVATION PER HR: HCPCS

## 2021-06-18 PROCEDURE — 36415 COLL VENOUS BLD VENIPUNCTURE: CPT

## 2021-06-18 PROCEDURE — 2580000003 HC RX 258: Performed by: INTERNAL MEDICINE

## 2021-06-18 PROCEDURE — C1887 CATHETER, GUIDING: HCPCS

## 2021-06-18 PROCEDURE — 6370000000 HC RX 637 (ALT 250 FOR IP): Performed by: FAMILY MEDICINE

## 2021-06-18 PROCEDURE — C1894 INTRO/SHEATH, NON-LASER: HCPCS

## 2021-06-18 PROCEDURE — C1884 EMBOLIZATION PROTECT SYST: HCPCS

## 2021-06-18 PROCEDURE — 85347 COAGULATION TIME ACTIVATED: CPT

## 2021-06-18 PROCEDURE — C1874 STENT, COATED/COV W/DEL SYS: HCPCS

## 2021-06-18 PROCEDURE — 92937 PRQ TRLUML REVSC CAB GRF 1: CPT | Performed by: INTERNAL MEDICINE

## 2021-06-18 PROCEDURE — 82962 GLUCOSE BLOOD TEST: CPT

## 2021-06-18 PROCEDURE — C9604 PERC D-E COR REVASC T CABG S: HCPCS

## 2021-06-18 PROCEDURE — 2709999900 HC NON-CHARGEABLE SUPPLY

## 2021-06-18 RX ORDER — SODIUM CHLORIDE 0.9 % (FLUSH) 0.9 %
5-40 SYRINGE (ML) INJECTION PRN
Status: DISCONTINUED | OUTPATIENT
Start: 2021-06-18 | End: 2021-06-19 | Stop reason: HOSPADM

## 2021-06-18 RX ORDER — SODIUM CHLORIDE 9 MG/ML
INJECTION, SOLUTION INTRAVENOUS CONTINUOUS
Status: ACTIVE | OUTPATIENT
Start: 2021-06-18 | End: 2021-06-18

## 2021-06-18 RX ORDER — SODIUM CHLORIDE 9 MG/ML
25 INJECTION, SOLUTION INTRAVENOUS PRN
Status: DISCONTINUED | OUTPATIENT
Start: 2021-06-18 | End: 2021-06-19 | Stop reason: HOSPADM

## 2021-06-18 RX ORDER — ISOSORBIDE MONONITRATE 30 MG/1
90 TABLET, EXTENDED RELEASE ORAL DAILY
Status: DISCONTINUED | OUTPATIENT
Start: 2021-06-19 | End: 2021-06-19 | Stop reason: HOSPADM

## 2021-06-18 RX ORDER — SODIUM CHLORIDE 0.9 % (FLUSH) 0.9 %
5-40 SYRINGE (ML) INJECTION EVERY 12 HOURS SCHEDULED
Status: DISCONTINUED | OUTPATIENT
Start: 2021-06-18 | End: 2021-06-19 | Stop reason: HOSPADM

## 2021-06-18 RX ORDER — SODIUM CHLORIDE 9 MG/ML
INJECTION, SOLUTION INTRAVENOUS CONTINUOUS
Status: DISCONTINUED | OUTPATIENT
Start: 2021-06-18 | End: 2021-06-18 | Stop reason: ALTCHOICE

## 2021-06-18 RX ORDER — ACETAMINOPHEN 325 MG/1
650 TABLET ORAL EVERY 4 HOURS PRN
Status: DISCONTINUED | OUTPATIENT
Start: 2021-06-18 | End: 2021-06-19 | Stop reason: HOSPADM

## 2021-06-18 RX ADMIN — ZINC SULFATE 220 MG (50 MG) CAPSULE 50 MG: CAPSULE at 10:19

## 2021-06-18 RX ADMIN — ROSUVASTATIN 10 MG: 20 TABLET, FILM COATED ORAL at 10:19

## 2021-06-18 RX ADMIN — SODIUM CHLORIDE: 9 INJECTION, SOLUTION INTRAVENOUS at 10:20

## 2021-06-18 RX ADMIN — PANTOPRAZOLE SODIUM 40 MG: 40 TABLET, DELAYED RELEASE ORAL at 06:27

## 2021-06-18 RX ADMIN — TAMSULOSIN HYDROCHLORIDE 0.4 MG: 0.4 CAPSULE ORAL at 17:18

## 2021-06-18 RX ADMIN — RANOLAZINE 1000 MG: 500 TABLET, FILM COATED, EXTENDED RELEASE ORAL at 19:59

## 2021-06-18 RX ADMIN — SODIUM CHLORIDE, PRESERVATIVE FREE 10 ML: 5 INJECTION INTRAVENOUS at 19:59

## 2021-06-18 RX ADMIN — Medication 10 ML: at 10:18

## 2021-06-18 RX ADMIN — LEVOTHYROXINE SODIUM 25 MCG: 0.03 TABLET ORAL at 06:27

## 2021-06-18 RX ADMIN — Medication 800 MG: at 10:19

## 2021-06-18 RX ADMIN — OXYCODONE HYDROCHLORIDE AND ACETAMINOPHEN 1000 MG: 500 TABLET ORAL at 10:19

## 2021-06-18 RX ADMIN — ISOSORBIDE MONONITRATE 60 MG: 60 TABLET ORAL at 10:19

## 2021-06-18 RX ADMIN — ASPIRIN 81 MG: 81 TABLET ORAL at 08:08

## 2021-06-18 RX ADMIN — SODIUM CHLORIDE: 9 INJECTION, SOLUTION INTRAVENOUS at 05:02

## 2021-06-18 RX ADMIN — RANOLAZINE 1000 MG: 500 TABLET, FILM COATED, EXTENDED RELEASE ORAL at 10:19

## 2021-06-18 RX ADMIN — TICAGRELOR 90 MG: 90 TABLET ORAL at 08:06

## 2021-06-18 RX ADMIN — Medication 800 MG: at 19:59

## 2021-06-18 RX ADMIN — Medication 10 ML: at 21:00

## 2021-06-18 RX ADMIN — TICAGRELOR 90 MG: 90 TABLET ORAL at 19:58

## 2021-06-18 RX ADMIN — CARVEDILOL 25 MG: 25 TABLET, FILM COATED ORAL at 17:18

## 2021-06-18 RX ADMIN — SODIUM CHLORIDE, PRESERVATIVE FREE 10 ML: 5 INJECTION INTRAVENOUS at 10:20

## 2021-06-18 ASSESSMENT — PAIN SCALES - GENERAL
PAINLEVEL_OUTOF10: 0

## 2021-06-18 NOTE — PLAN OF CARE
Problem: Safety:  Goal: Free from accidental physical injury  Description: Free from accidental physical injury  6/18/2021 1048 by Leon Weaver RN  Outcome: Met This Shift  6/18/2021 0112 by Sheela Connors RN  Outcome: Met This Shift  Goal: Free from intentional harm  Description: Free from intentional harm  Outcome: Met This Shift     Problem: Daily Care:  Goal: Daily care needs are met  Description: Daily care needs are met  6/18/2021 0112 by Sheela Connors RN  Outcome: Met This Shift     Problem: Pain:  Goal: Patient's pain/discomfort is manageable  Description: Patient's pain/discomfort is manageable  6/18/2021 0112 by Sheela Connors RN  Outcome: Met This Shift     Problem: Falls - Risk of:  Goal: Will remain free from falls  Description: Will remain free from falls  6/18/2021 0112 by Sheela Connors RN  Outcome: Met This Shift     Problem: Cardiac:  Goal: Hemodynamic stability will improve  Description: Hemodynamic stability will improve  Outcome: Met This Shift

## 2021-06-18 NOTE — PROCEDURES
[address]                  [address] [facility],  [facility]                            CARDIAC CATHETERIZATION    PATIENT NAME: Tamara Mao                   :        1945  MED REC NO:   [id number]                            ROOM:       1234  ACCOUNT NO:   [de-identified]                           ADMIT DATE: 2021  PROVIDER:     Javi Childs MD    DATE OF PROCEDURE:  2021    STAGED PCI    INDICATIONS:  Very severe proximal and proximal to mid SVG stenosis  attached to obtuse marginal branch. PROCEDURE NOTE:  After obtaining a signed consent from the patient, he  was brought to the cardiac cath lab in his usual fasting state. Under  sterile condition and under local anesthetic and using conscious  sedation, a 6-[place] Terumo Slender sheath was inserted into the right  radial artery. The patient received 9000 units of intravenous heparin. He has been on aspirin, Plavix, and Brilinta since yesterday. He also  received 200 mcg of intraarterial nitroglycerin and 2.5 mg of diluted  verapamil via the right radial sheath. Then, a 6-[place] AL1 guide  catheter was advanced to the ascending aorta with little difficulty due  to angulated right subclavian artery. This guide was used to engage the  SVG attached to the obtuse marginal branch and an angiogram was done. Angiogram done revealed 80% discrete proximal SVG stenosis and 80% to  90% hazy probably clotted proximal to mid stenosis with LUBNA-2 flow  distally. Then, I could not advance an EZ FilterWire beyond the  proximal to mid stenosis. The filter was pulled out. Then, I could not  wire the proximal to mid lesion with a BMW wire. Finally, a Runthrough  wire was able to be advanced to the distal SVG without difficulty. The  patient received two doses of 200 mcg of intracoronary Cardene.   Then, a  5.0 x 22 Resolute [place] stent was deployed at 12 atmospheres at the  proximal to mid segment of the SVG graft. Then, a 5.0 x 15 Resolute  [place] stent was deployed at 14 atmospheres at the proximal segment of the  SVG with 0% residual stenosis and LUBNA 2 to 3 flow distally. The  patient's ACT was 342. At the end of the procedure, the wire and the  guide catheter were pulled out. The Terumo Slender sheath was pulled  out and a Vasc Band was applied over the right radial artery with good  hemostasis. The patient tolerated the procedure very well and no  complications were noted. No significant blood loss occurred during the  procedure. IMPRESSION:  Successful direct stenting of proximal and proximal to mid  severe SVG stenosis attached to obtuse marginal branch using 2 drug-eluting stents. PLAN:  1. Continue DAPT. 2.  Aggressive medical therapy. 3.  Cardiac rehab post hospital discharge. PROCEDURE START TIME:  08:44 a.m. PROCEDURE END TIME:  09:22 a.m. CONTRAST VOLUME:  60 mL of Isovue. FLUOROSCOPY TIME:  10.3 minutes. CONSCIOUS SEDATION:  1 mg of intravenous Versed and 50 mcg of  intravenous fentanyl.         Deena Hicks MD    D: 06/18/2021 9:37:24       T: 06/18/2021 9:46:50     [id number]  Job#: [id number]     Doc#: [id number]    CC:

## 2021-06-18 NOTE — PROGRESS NOTES
[facility]  Office Progress Note - Dr. Luzmaria Dumont  3/11/21    CC:   Chief Complaint   Patient presents with    Hypertension        HPI:   Hypertension  Follow-up  Blood pressure is controlled today. BP Readings from Last 3 Encounters:   03/11/21 130/86   09/11/20 (!) 142/88   01/15/20 120/78     Patient continues medications regularly. Compliance is good. Denies CP, sob, abd pain, headaches, vision changes, dizziness, hypotensive symptoms. No side effects from medications noted. Has follow-up with nephrology next week. DUSTY  Continue CPAP nightly. Feels rested in the morning when he uses. Wears it all night every night. On a rare night when he skips, he does feel increased fatigue the next day. Hyperlipidemia  Due for recheck on lipids. Untreated. Lab Results   Component Value Date    CHOL 234 (H) 10/17/2017    CHOL 182 02/27/2017    CHOL 207 (H) 08/25/2016     Lab Results   Component Value Date    TRIG 153 (H) 10/17/2017    TRIG 46 02/27/2017    TRIG 80 08/25/2016     Lab Results   Component Value Date    HDL 53 10/17/2017    HDL 45 02/27/2017    HDL 61 08/25/2016     Lab Results   Component Value Date    LDLCALC 150 (H) 10/17/2017    LDLCALC 128 (H) 02/27/2017    LDLCALC 130 (H) 08/25/2016         _________________________________________________________    Assessment / Jessica Lara was seen today for hypertension. Diagnoses and all orders for this visit:    Essential hypertension  -     CBC Auto Differential; Future  -     Comprehensive Metabolic Panel; Future  Blood pressure acceptable. Continue same medication regimen. Following up with nephrology next week. Continue to treat sleep apnea. Continue to work on weight loss. Elevated cholesterol  -     Lipid Panel; Future  Update lipids. Not currently treated. Risk not high enough.   The ASCVD Risk score (Natacha Bloom, et al., 2013) failed to calculate for the following reasons:    Cannot find a previous HDL lab Cannot find a previous total cholesterol lab     Obstructive sleep apnea  Continues to treat nightly with CPAP machine. Feels rested in the morning. Thinks it is working well. Return in about 6 months (around 9/11/2021). or as scheduled. Patient counseled to follow up sooner or seek more acute care if symptoms worsening or not improving according to plan. Electronically signed by Varun Potter MD on 3/11/2021    _________________________________________________________  Current Outpatient Medications on File Prior to Visit   Medication Sig Dispense Refill    hydrALAZINE (APRESOLINE) 25 MG tablet Take 1 tablet by mouth 3 times daily 270 tablet 1    hydrALAZINE (APRESOLINE) 50 MG tablet Take 1 tablet by mouth 3 times daily 270 tablet 1    allopurinol (ZYLOPRIM) 300 MG tablet TAKE ONE TABLET BY MOUTH ONCE DAILY FOR GOUT PREVENTION 30 tablet 5    doxazosin (CARDURA) 2 MG tablet Take 1 tablet by mouth 2 times daily bid 30 tablet 5     No current facility-administered medications on file prior to visit.         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     _________________________________________________________  Past Medical History:   Diagnosis Date    Hyperlipidemia     Hypertension        Family History   Problem Relation Age of Onset    Heart Disease Mother     Heart Attack Mother     High Blood Pressure Mother     Heart Disease Father     Heart Attack Father     High Blood Pressure Father        Past Surgical History:   Procedure Laterality Date    CYST REMOVAL Right     Back    WISDOM TOOTH EXTRACTION         Social History     Tobacco Use    Smoking status: Never Smoker    Smokeless tobacco: Current User     Types: Chew   Substance Use Topics    Alcohol use: Yes     Comment: SOCIAL    Drug use: No       Chart reviewed and updated where appropriate for discharge, double Crestor to 20 mg nightly. #5.  Diabetes-per Dr. Gabe Bocanegra. #6.  Continue to follow.

## 2021-06-18 NOTE — H&P
MONOSABS 0.53 06/14/2021    LYMPHSABS 1.68 06/14/2021    EOSABS 0.42 06/14/2021    BASOSABS 0.03 06/14/2021     CMP:    Lab Results   Component Value Date     06/18/2021    K 3.9 06/18/2021     06/18/2021    CO2 21 06/18/2021    BUN 13 06/18/2021    CREATININE 1.2 06/18/2021    GFRAA >60 06/18/2021    LABGLOM 59 06/18/2021    GLUCOSE 89 06/18/2021    GLUCOSE 142 05/21/2012    PROT 6.4 06/14/2021    LABALBU 3.6 06/14/2021    LABALBU 4.1 02/28/2012    CALCIUM 8.7 06/18/2021    BILITOT 0.6 06/14/2021    ALKPHOS 69 06/14/2021    AST 19 06/14/2021    ALT 17 06/14/2021         EKG:  I've personally reviewed the patient's EKG:  Sinus Rhythm with PVC    Telemetry:  I've personally reviewed the patient's telemetry:  NSR    ASSESSMENT/PLAN:  Principal Problem:    CAD in native artery  Resolved Problems:    * No resolved hospital problems. *    73 year old male with a past medical history of CAD, DM, HTN, HLD, GERD admitted to telemetry with     CAD in native artery  -DAPT - ASA/Brillinta  -Patient had staggered PCI intervention 6/17/2021 to the first PLV branch. PCI intervention 6/18/2021 with 2 stent placements in the SVG. -Discussed with the patient importance of weight loss and diet modifications  -Cardiac rehab after discharge    Medication for other comorbidities continue as appropriate dose adjustment as necessary. DVT prophylaxis  PT OT  Discharge planning  Case discussed with attending and agreed upon plan of care. Code status: Full  Requires inipatient level of care  VERENICE Linares - CNP    11:50 AM  6/18/2021     Admitted for intermittent chest pain with shortness of breath  Found to have NSTEMI  Status post left heart cath with PCI  DAPT, high intensity statin, beta-blocker, ACE inhibitor  Await echocardiogram  Risk factor modifications discussed  Discharge hopefully 24 hours post cath    I personally saw, examined and provided care for the patient.  Radiographs, labs and medication list were reviewed by me independently. The case was discussed in detail and plans for care were established. Review of [address] [place] [address], documentation was conducted and revisions were made as appropriate directly by me. I agree with the above documented exam, problem list, and plan of care.      Janice Shoemaker MD  12:10 PM  6/18/2021

## 2021-06-18 NOTE — CONSULTS
EXAM: Right lower extremity venous Doppler sonogram; right lower extremity

arterial Doppler sonogram.

 

HISTORY: Pain and swelling.

 

TECHNIQUE: Gray scale and color Doppler sonographic evaluation of the 

right lower extremity veins and arteries with spectral waveform analysis 

was performed.

 

FINDINGS: There are triphasic and biphasic waveforms there are normal peak

systolic velocities throughout the right lower extremity arteries, with 

exception of the right peroneal artery which is not seen.

 

There is normal color flow, normal compressibility and there are normal 

spectral waveforms in the common femoral, superficial femoral, popliteal, 

and posterior tibial veins. The peroneal veins are not seen. There is soft

tissue edema.

 

IMPRESSION: 

1. Limited exam due to patient body habitus and soft tissue edema. The 

right peroneal artery and veins are not seen.

2. No Doppler evidence of lower extremity deep venous thrombosis or 

significant stenosis involving the visualized veins and arteries.

 

Electronically signed by: Katheryn Martinez MD (12/5/2019 11:41 AM) [facility] Met with patient and discussed that their physician has ordered a referral to our outpatient Phase II Cardiac Rehabilitation program. Reviewed the benefits of cardiac rehabilitation based on their diagnosis and personal risk factors. Patient demonstrates moderate interest in Cardiac Rehabilitation at this time. Cardiac Rehabilitation brochure provided to patient/family. The Cardiac Rehabilitation Program has been provided the patient's referral information and pertinent patient details and history. The patient may call [facility] at [phone] for additional information or questions. Contact information for [facility] and other choices close to the patient's residence have been provided in the discharge instructions so that the patient may call and schedule an appointment when cleared by their physician.  Thank you for the referral.

## 2021-06-18 NOTE — PLAN OF CARE
Problem: Safety:  Goal: Free from accidental physical injury  Description: Free from accidental physical injury  Outcome: Met This Shift     Problem: Daily Care:  Goal: Daily care needs are met  Description: Daily care needs are met  Outcome: Met This Shift     Problem: Pain:  Goal: Patient's pain/discomfort is manageable  Description: Patient's pain/discomfort is manageable  Outcome: Met This Shift     Problem: Falls - Risk of:  Goal: Will remain free from falls  Description: Will remain free from falls  Outcome: Met This Shift

## 2021-06-19 VITALS
RESPIRATION RATE: 17 BRPM | DIASTOLIC BLOOD PRESSURE: 87 MMHG | SYSTOLIC BLOOD PRESSURE: 177 MMHG | OXYGEN SATURATION: 95 % | WEIGHT: 250 LBS | TEMPERATURE: 97.8 F | HEART RATE: 68 BPM | HEIGHT: 70 IN | BODY MASS INDEX: 35.79 KG/M2

## 2021-06-19 LAB
ANION GAP SERPL CALCULATED.3IONS-SCNC: 13 MMOL/L (ref 7–16)
BUN BLDV-MCNC: 13 MG/DL (ref 6–23)
CALCIUM SERPL-MCNC: 9.1 MG/DL (ref 8.6–10.2)
CHLORIDE BLD-SCNC: 103 MMOL/L (ref 98–107)
CO2: 24 MMOL/L (ref 22–29)
CREAT SERPL-MCNC: 1.2 MG/DL (ref 0.7–1.2)
GFR AFRICAN AMERICAN: >60
GFR NON-AFRICAN AMERICAN: 59 ML/MIN/1.73
GLUCOSE BLD-MCNC: 132 MG/DL (ref 74–99)
HCT VFR BLD CALC: 43.4 % (ref 37–54)
HEMOGLOBIN: 14.6 G/DL (ref 12.5–16.5)
MCH RBC QN AUTO: 29.9 PG (ref 26–35)
MCHC RBC AUTO-ENTMCNC: 33.6 % (ref 32–34.5)
MCV RBC AUTO: 88.9 FL (ref 80–99.9)
METER GLUCOSE: 123 MG/DL (ref 74–99)
METER GLUCOSE: 146 MG/DL (ref 74–99)
PDW BLD-RTO: 12.4 FL (ref 11.5–15)
PLATELET # BLD: 151 E9/L (ref 130–450)
PMV BLD AUTO: 11.5 FL (ref 7–12)
POTASSIUM SERPL-SCNC: 4.2 MMOL/L (ref 3.5–5)
RBC # BLD: 4.88 E12/L (ref 3.8–5.8)
SODIUM BLD-SCNC: 140 MMOL/L (ref 132–146)
WBC # BLD: 6.7 E9/L (ref 4.5–11.5)

## 2021-06-19 PROCEDURE — 6370000000 HC RX 637 (ALT 250 FOR IP): Performed by: FAMILY MEDICINE

## 2021-06-19 PROCEDURE — 6370000000 HC RX 637 (ALT 250 FOR IP): Performed by: INTERNAL MEDICINE

## 2021-06-19 PROCEDURE — 027034Z DILATION OF CORONARY ARTERY, ONE ARTERY WITH DRUG-ELUTING INTRALUMINAL DEVICE, PERCUTANEOUS APPROACH: ICD-10-PCS | Performed by: INTERNAL MEDICINE

## 2021-06-19 PROCEDURE — 2580000003 HC RX 258: Performed by: INTERNAL MEDICINE

## 2021-06-19 PROCEDURE — 4A023N7 MEASUREMENT OF CARDIAC SAMPLING AND PRESSURE, LEFT HEART, PERCUTANEOUS APPROACH: ICD-10-PCS | Performed by: INTERNAL MEDICINE

## 2021-06-19 PROCEDURE — 85027 COMPLETE CBC AUTOMATED: CPT

## 2021-06-19 PROCEDURE — B2111ZZ FLUOROSCOPY OF MULTIPLE CORONARY ARTERIES USING LOW OSMOLAR CONTRAST: ICD-10-PCS | Performed by: INTERNAL MEDICINE

## 2021-06-19 PROCEDURE — B2181ZZ FLUOROSCOPY OF LEFT INTERNAL MAMMARY BYPASS GRAFT USING LOW OSMOLAR CONTRAST: ICD-10-PCS | Performed by: INTERNAL MEDICINE

## 2021-06-19 PROCEDURE — 36415 COLL VENOUS BLD VENIPUNCTURE: CPT

## 2021-06-19 PROCEDURE — B2151ZZ FLUOROSCOPY OF LEFT HEART USING LOW OSMOLAR CONTRAST: ICD-10-PCS | Performed by: INTERNAL MEDICINE

## 2021-06-19 PROCEDURE — 82962 GLUCOSE BLOOD TEST: CPT

## 2021-06-19 PROCEDURE — 2060000000 HC ICU INTERMEDIATE R&B

## 2021-06-19 PROCEDURE — G0378 HOSPITAL OBSERVATION PER HR: HCPCS

## 2021-06-19 PROCEDURE — B2131ZZ FLUOROSCOPY OF MULTIPLE CORONARY ARTERY BYPASS GRAFTS USING LOW OSMOLAR CONTRAST: ICD-10-PCS | Performed by: INTERNAL MEDICINE

## 2021-06-19 PROCEDURE — 80048 BASIC METABOLIC PNL TOTAL CA: CPT

## 2021-06-19 RX ORDER — ROSUVASTATIN CALCIUM 10 MG/1
20 TABLET, COATED ORAL DAILY
Qty: 30 TABLET | Refills: 3 | Status: SHIPPED | OUTPATIENT
Start: 2021-06-19 | End: 2021-06-24

## 2021-06-19 RX ORDER — ISOSORBIDE MONONITRATE 60 MG/1
90 TABLET, EXTENDED RELEASE ORAL DAILY
Qty: 180 TABLET | Refills: 1 | Status: SHIPPED | OUTPATIENT
Start: 2021-06-19 | End: 2021-08-02

## 2021-06-19 RX ADMIN — RANOLAZINE 1000 MG: 500 TABLET, FILM COATED, EXTENDED RELEASE ORAL at 09:25

## 2021-06-19 RX ADMIN — SODIUM CHLORIDE, PRESERVATIVE FREE 10 ML: 5 INJECTION INTRAVENOUS at 09:23

## 2021-06-19 RX ADMIN — ISOSORBIDE MONONITRATE 90 MG: 30 TABLET, EXTENDED RELEASE ORAL at 09:23

## 2021-06-19 RX ADMIN — ASPIRIN 81 MG: 81 TABLET ORAL at 09:23

## 2021-06-19 RX ADMIN — ROSUVASTATIN 10 MG: 20 TABLET, FILM COATED ORAL at 09:26

## 2021-06-19 RX ADMIN — PANTOPRAZOLE SODIUM 40 MG: 40 TABLET, DELAYED RELEASE ORAL at 06:43

## 2021-06-19 RX ADMIN — TICAGRELOR 90 MG: 90 TABLET ORAL at 09:25

## 2021-06-19 RX ADMIN — Medication 800 MG: at 09:23

## 2021-06-19 RX ADMIN — OXYCODONE HYDROCHLORIDE AND ACETAMINOPHEN 1000 MG: 500 TABLET ORAL at 09:25

## 2021-06-19 RX ADMIN — ZINC SULFATE 220 MG (50 MG) CAPSULE 50 MG: CAPSULE at 09:20

## 2021-06-19 RX ADMIN — LEVOTHYROXINE SODIUM 25 MCG: 0.03 TABLET ORAL at 06:43

## 2021-06-19 RX ADMIN — SACUBITRIL AND VALSARTAN 1 TABLET: 24; 26 TABLET, FILM COATED ORAL at 09:23

## 2021-06-19 RX ADMIN — CARVEDILOL 25 MG: 25 TABLET, FILM COATED ORAL at 09:35

## 2021-06-19 ASSESSMENT — PAIN SCALES - GENERAL
PAINLEVEL_OUTOF10: 0

## 2021-06-19 NOTE — PLAN OF CARE
Problem: Safety:  Goal: Free from accidental physical injury  Description: Free from accidental physical injury  6/19/2021 1018 by Hardeep Magana RN  Outcome: Met This Shift  6/19/2021 0106 by Matthias Mckinnon RN  Outcome: Met This Shift     Problem: Falls - Risk of:  Goal: Will remain free from falls  Description: Will remain free from falls  6/19/2021 1018 by Hardeep Magana RN  Outcome: Met This Shift  6/19/2021 0106 by Matthias Mckinnon RN  Outcome: Met This Shift     Problem: Cardiac:  Goal: Ability to maintain an adequate cardiac output will improve  Description: Ability to maintain an adequate cardiac output will improve  Outcome: Met This Shift

## 2021-06-19 NOTE — PROGRESS NOTES
[facility] [address]    Name: Davide Shankar    Age: 67 y.o. PCP: VERENICE Gtz CNP    Date of Admission: 6/17/2021  3:58 PM    Date of Service: 6/19/2021    Chief Complaint: Follow-up for CAD s/p cath    Interim History:  No new overnight cardiac complaints. Had PCI of circumflex and posterolateral branch. Currently with no complaints of CP, SOB, palpitations, dizziness, or lightheadedness. Telemetry personally reviewed and showed NSR      Review of Systems:   Cardiac: As per HPI  General: No fever, chills  Pulmonary: No cough, wheeze, or shortness of breath  GI: No nausea, vomiting,or abdominal pain  Neuro: No headache or confusion    Problem List:  Principal Problem:    CAD in native artery  Resolved Problems:    * No resolved hospital problems.  *      Past Medical History:  Past Medical History:   Diagnosis Date    CAD (coronary artery disease)     Cancer [facility] [address]     skin    Diabetes mellitus ([facility] [address] [facility]     Diverticular disease of colon     DJD (degenerative joint disease), multiple sites     GERD (gastroesophageal reflux disease)     Goiter     H/O thyroid nodule     Hx of malignant neoplasm of prostate     Hyperlipidemia     Hypertension     Hypothyroidism     Status post insertion of drug eluting coronary artery stent 06/18/2021    SVG to OM 1       Allergies:  No Known Allergies    Current Medications:  Current Facility-Administered Medications   Medication Dose Route Frequency Provider Last Rate Last Admin    sodium chloride flush 0.9 % injection 5-40 mL  5-40 mL Intravenous 2 times per day Janey Umanzor MD   10 mL at 06/19/21 0923    sodium chloride flush 0.9 % injection 5-40 mL  5-40 mL Intravenous PRN Janey Umanzor MD        0.9 % sodium chloride infusion  25 mL Intravenous PRN Janey Umanzor MD        acetaminophen (TYLENOL) tablet 650 mg  650 mg Oral Q4H PRN Janey Umanzor MD        sacubitril-valsartan (ENTRESTO) 24-26 MG per tablet 1 tablet  1 tablet Oral BID Freddy Deleon MD   1 tablet at 06/19/21 1476    isosorbide mononitrate (IMDUR) extended release tablet 90 mg  90 mg Oral Daily Freddy Deleon MD   90 mg at 06/19/21 0923    sodium chloride flush 0.9 % injection 5-40 mL  5-40 mL Intravenous 2 times per day Bill Kay MD   10 mL at 06/18/21 2100    sodium chloride flush 0.9 % injection 5-40 mL  5-40 mL Intravenous PRN Bill Kay MD   10 mL at 06/17/21 1621    0.9 % sodium chloride infusion  25 mL Intravenous PRN Bill Kay MD        aspirin EC tablet 81 mg  81 mg Oral Daily Bill Kay MD   81 mg at 06/19/21 0923    ticagrelor (BRILINTA) tablet 90 mg  90 mg Oral BID Bill Kay MD   90 mg at 06/19/21 0925    ascorbic acid (VITAMIN C) tablet 1,000 mg  1,000 mg Oral Daily Petrona Brice APRN - CNP   1,000 mg at 06/19/21 0925    carvedilol (COREG) tablet 25 mg  25 mg Oral BID  Petrona Brice APRN - CNP   25 mg at 06/19/21 0935    levothyroxine (SYNTHROID) tablet 25 mcg  25 mcg Oral Daily Petrona Brice APRN - CNP   25 mcg at 06/19/21 0643    magnesium oxide (MAG-OX) tablet 800 mg  800 mg Oral BID Petrona Brice APRN - CNP   800 mg at 06/19/21 0923    nitroGLYCERIN (NITROSTAT) SL tablet 0.4 mg  0.4 mg Sublingual Q5 Min PRN Petrona Brice APRN - CNP        pantoprazole (PROTONIX) tablet 40 mg  40 mg Oral QAM AC Petrona Brice APRN - CNP   40 mg at 06/19/21 6301    ranolazine (RANEXA) extended release tablet 1,000 mg  1,000 mg Oral BID Petrona Brice APRN - CNP   1,000 mg at 06/19/21 7813    rosuvastatin (CRESTOR) tablet 10 mg  10 mg Oral Daily Petrona Brice APRN - CNP   10 mg at 06/19/21 0926    tamsulosin (FLOMAX) capsule 0.4 mg  0.4 mg Oral Daily VERENICE Salas CNP   0.4 mg at 06/18/21 1718    zinc sulfate (ZINCATE) capsule 50 mg  50 mg Oral Daily VERENICE Salas CNP   50 mg at 06/19/21 0920    insulin lispro (HUMALOG) injection vial 0-6 Units  0-6 Units Subcutaneous TID WC Sy Brice, APRN - CNP        insulin lispro (HUMALOG) injection vial 0-3 Units  0-3 Units Subcutaneous Nightly Sy Brice, APRN - CNP        glucose (GLUTOSE) 40 % oral gel 15 g  15 g Oral PRN Montom Osito Babs, APRN - CNP        dextrose 50 % IV solution  12.5 g Intravenous PRN Montom Osito Babs, APRN - CNP        glucagon (rDNA) injection 1 mg  1 mg Intramuscular PRN Sy Foxcheri Babs, APRN - CNP        dextrose 5 % solution  100 mL/hr Intravenous PRN Montom Osito Babs, APRN - CNP          sodium chloride      sodium chloride      dextrose         Physical Exam:  BP (!) 177/87   Pulse 68   Temp 97.8 °F (36.6 °C) (Oral)   Resp 17   Ht 5' 10\" (1.778 m)   Wt 250 lb (113.4 kg)   SpO2 95%   BMI 35.87 kg/m²   Weight change: Wt Readings from Last 3 Encounters:   06/17/21 250 lb (113.4 kg)   02/11/21 244 lb (110.7 kg)   12/11/20 253 lb (114.8 kg)     General: Awake, alert, oriented x3, no acute distress  Neck: No JVD, carotid bruits, thyromegaly, or lymphadenopathy  Cardiac: Regular rate and rhythm, normal S1 and split S2, no murmurs, no S3 or S4, no pericardial rubs. Carotid upstrokes brisk  Resp: clear bilaterally without wheezes, rhonchi, or rales; unlabored respirations  Abdomen: soft, nontender, nondistended, BS+; no masses, bruits, or hepatomegaly  Extremities: no cyanosis, clubbing, or edema.   Distal pulses intact, cath sites stable  Skin: Warm and dry, no rashes or lesions  Neuro: moves all extremities to command, no focal deficits noted    Intake/Output:    Intake/Output Summary (Last 24 hours) at 6/19/2021 1143  Last data filed at 6/19/2021 0850  Gross per 24 hour   Intake 1230 ml   Output 1300 ml   Net -70 ml     I/O this shift:  In: 240 [P.O.:240]  Out: -     Laboratory Tests:  Last 3 CBC:  Recent Labs     06/18/21  0517 06/19/21  0640   WBC 6.5 6.7   RBC 4.41 4.88   HGB 13.2 14.6   HCT 39.2 43.4   MCV 88.9 88.9   MCH 29.9 29.9   MCHC 33.7 33.6   RDW 12.7 12.4    151   MPV 12.1* 11.5       Last 3 CMP:    Recent Labs     06/18/21  0517 06/19/21  0640    140   K 3.9 4.2    103   CO2 21* 24   BUN 13 13   CREATININE 1.2 1.2   GLUCOSE 89 132*   CALCIUM 8.7 9.1       Last 3 Mag/Phos:  No results for input(s): MG, PHOS in the last 72 hours. Last 3 CK, CKMB, Troponin  No results for input(s): CKTOTAL, CKMB, TROPONINI in the last 72 hours. Last 3 BNP:  No results for input(s): BNP in the last 72 hours. No results found for: BNP    Last 3 Glucose:     Recent Labs     06/18/21  0517 06/19/21  0640   GLUCOSE 89 132*       Last 3 Coags:  No results for input(s): PROTIME, INR, PTT in the last 72 hours. Lab Results   Component Value Date    PROTIME 12.0 06/14/2021    INR 1.1 06/14/2021       Last 3 Lipid Panel:  Recent Labs     06/18/21  0517   LDLCALC 88   HDL 27   TRIG 254*     Lab Results   Component Value Date    LDLCALC 88 06/18/2021    LDLCALC 101 (H) 06/14/2021    LDLCALC 82 02/08/2021     Lab Results   Component Value Date    HDL 27 06/18/2021    HDL 32 06/14/2021    HDL 38 02/08/2021     Lab Results   Component Value Date    TRIG 254 (H) 06/18/2021    TRIG 222 (H) 06/14/2021    TRIG 295 (H) 02/08/2021     No components found for: CHLPL    TSH:  No results for input(s): TSH in the last 72 hours. Lab Results   Component Value Date    TSH 3.660 06/14/2021           Radiology:  XR CHEST PORTABLE   Final Result   Suspect mild pulmonary vascular congestion. ASSESSMENT / PLAN:     #1. Obstructive CAD with accelerating angina-doing well post LEFTY placement to the posterior lateral and 2 LEFTY in vein graft to obtuse marginal.  Continue dual antiplatelet therapy for at least 1 year. [id number] [address] for discharge home. Appointment already arranged in my office on July 6, 2021.     #2. Hypertension, uncontrolled-Entresto 24/26 mg twice  for ACC stage C cardiomyopathy/NYHA class II CHF symptoms. Increase isosorbide to 90 mg daily.   Monitor blood pressure afterwards.     #3. Ischemic cardiomyopathy-ejection fraction 40-45%. Entresto added  24 hours after dye use to be continued with carvedilol as above for cardioprotection.     #4. Hyperlipidemia-lipid profile showed LDL of 88. Goal is below 70 given CAD and diabetes. At discharge, double Crestor to 20 mg nightly.     #5. Diabetes-per Dr. Kinga Appiah.     #6. Continue to follow.               Darlyn Hua MD, MD, [address] [place] [address] at [facility]    Electronically signed by Darlyn Hua MD on 6/19/2021 at 11:43 AM

## 2021-06-19 NOTE — DISCHARGE SUMMARY
Physician Discharge Summary     Patient ID:  Ale Joy  [id number]  67 y.o.  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, or unspecified    Malignant neoplasm of prostate (HCC)    Non-toxic multinodular goiter    Postoperative retention of urine    Stenosis of carotid artery    Diabetes mellitus ([address]    [facility]athyroidism ([facility] [address]    Morbidly obese ([facility] [address]    Acute retention of urine    CAD (coronary artery disease)    GERD (gastroesophageal reflux disease)    Hyperlipidemia    Vitamin D deficiency    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 male of VERENICE Lincoln CNP with significant past medical history of CAD, DM, DJD, GERD, hyperlipidemia, hypertension, and hypothyroidism who presents with post cardiac catheterization with PCI intervention.   Patient had staggered stent placement initial on was on 6/17 patient had one stent placed to the posterior lateral and on 6/18 he had two stents placed in vein graft to obtuse marginal.  Patient is to continue on dual antiplatelet therapy for at least 1 year in the form of ASA 81 mg and Brilinta 90 mg twice daily, patient increased his isosorbide to 90 mg daily, was started on Entresto 24/26 mm just twice daily starting 6/1 9 for stage C cardiomyopathy, patient's Crestor was changed to 20 mg nightly to attempt again goal for LDL below 70. Patient chose not to use [id number] [facility] cardiac rehab facility, information given for patient to attend cardiac rehab at [facility].  Patient was educated on the importance of weight loss and diet modifications. Instructed patient to maintain blood pressure log x2 weeks and presented to his PCP. Recent Labs     06/18/21  0517 06/19/21  0640   WBC 6.5 6.7   HGB 13.2 14.6   HCT 39.2 43.4    151       Recent Labs     06/18/21  0517 06/19/21  0640    140   K 3.9 4.2    103   CO2 21* 24   BUN 13 13   CREATININE 1.2 1.2   CALCIUM 8.7 9.1       XR CHEST PORTABLE    Result Date: 6/18/2021  EXAMINATION: ONE XRAY VIEW OF THE CHEST 6/18/2021 1:00 pm COMPARISON: None. HISTORY: ORDERING SYSTEM PROVIDED HISTORY: sob TECHNOLOGIST PROVIDED HISTORY: Reason for exam:->sob What reading provider will be dictating this exam?->CRC FINDINGS: The heart is enlarged. Pulmonary vessels are prominent. No pneumothorax. Mild blunting of the left costophrenic angle. Median sternotomy wires. Suspect mild pulmonary vascular congestion. Discharge Exam:    HEENT: NCAT,  PERRLA, No JVD  Heart:  RRR, no murmurs, gallops, or rubs.   Lungs:  CTA bilaterally, no wheeze, rales or rhonchi  Abd: bowel sounds present, nontender, nondistended, no masses  Extrem:  No clubbing, cyanosis, or edema    Disposition: home     Patient Condition at Discharge: Stable    Patient Instructions:      Medication List      START taking these medications    sacubitril-valsartan 24-26 MG per tablet  Commonly known as: ENTRESTO  Take 1 tablet by mouth 2 times daily     ticagrelor 90 MG Tabs tablet  Commonly known as: BRILINTA  Take 1 tablet by mouth 2 times daily        CHANGE how you take these medications    isosorbide mononitrate 60 MG extended release tablet  Commonly known as: IMDUR  Take 1.5 tablets by mouth daily  What changed:   · how much to take  · when to take this     rosuvastatin 10 MG tablet  Commonly known as: Crestor  Take 2 tablets by mouth daily  What changed: how much to take        CONTINUE taking these medications    aspirin 81 MG tablet     B COMPLEX 1 PO     carvedilol 25 MG tablet  Commonly known as: COREG  TAKE 1 TABLET BY MOUTH 2  TIMES DAILY (WITH MEALS)     FiberCon 625 MG tablet  Generic drug: polycarbophil     fish oil 8683 MG Caps     Garlic 090 MG Caps     insulin lispro 100 UNIT/ML injection vial  Commonly known as: HUMALOG     Invokana 100 MG Tabs tablet  Generic drug: canagliflozin     levothyroxine 25 MCG tablet  Commonly known as: SYNTHROID  Take 1 tablet by mouth daily     Magnesium Oxide 400 MG Caps     nitroGLYCERIN 0.4 MG SL tablet  Commonly known as: NITROSTAT  Place 1 tablet under the tongue every 5 minutes as needed for Chest pain up to max of 3 total doses. If no relief after 1 dose, call 911.      omeprazole 20 MG delayed release capsule  Commonly known as: PRILOSEC  TAKE 1 CAPSULE ORALLY ONCE  PER DAY     OneTouch Ultra strip  Generic drug: blood glucose test strips  1 each by Does not apply route 2 times daily     ranolazine 1000 MG extended release tablet  Commonly known as: Ranexa  Take 1 tablet by mouth 2 times daily     tamsulosin 0.4 MG capsule  Commonly known as: FLOMAX     Tresiba FlexTouch 200 UNIT/ML Sopn  Generic drug: Insulin Degludec     TURMERIC PO     vitamin C 1000 MG tablet     Vitamin D3 50 MCG (2000 UT) Caps     Voltaren 1 % Gel  Generic drug: diclofenac sodium     zinc sulfate 220 (50 Zn) MG capsule  Commonly known as: ZINCATE        STOP taking these medications    Handicap Placard Misc     Microlet Lancets Misc           Where to Get Your Medications      These medications were sent to [address], [address] [facility] - F [phone]  Rentaa Kumari    Phone: [phone]   · isosorbide mononitrate 60 MG extended release tablet  · rosuvastatin 10 MG tablet  · sacubitril-valsartan 24-26 MG per tablet  · ticagrelor 90 MG Tabs tablet       Activity: activity as tolerated  Diet: cardiac diet and diabetic diet    Pt has been advised to: Follow-up with VERENICE Mitchell CNP in 1 week. Follow-up with consultants as recommended by them    Note that over 30 minutes was spent in preparing discharge papers, discussing discharge with patient, medication review, etc.    Signed:  VERENICE Skelton CNP  6/19/2021  1:04 PM     Patient advised to return to the emergency department if any further chest pain or shortness of breath  Follow-up with PCP and with cardiology as recommended by them  Importance of medication compliance discussed  Importance of risk factor modification with weight loss discussed    I personally saw, examined and provided care for the patient. Radiographs, labs and medication list were reviewed by me independently. The case was discussed in detail and plans for care were established. Review of [address] [place] [address], documentation was conducted and revisions were made as appropriate directly by me. I agree with the above documented exam, problem list, and plan of care.      Janny Ervin MD  1:30 PM  6/19/2021

## 2021-06-22 ENCOUNTER — CARE COORDINATION (OUTPATIENT)
Dept: CASE MANAGEMENT | Age: 76
End: 2021-06-22

## 2021-06-22 ENCOUNTER — TELEPHONE (OUTPATIENT)
Dept: PHARMACY | Facility: CLINIC | Age: 76
End: 2021-06-22

## 2021-06-22 NOTE — TELEPHONE ENCOUNTER
Received a referral:  from Care Coordinator to review patients medications. Called patient to schedule a time to speak with a pharmacist over the telephone. Spoke to patient and advised them of the above message. Patient verified understanding and scheduled their appointment: 6/24 at 7:30AM      [address]    [address] [place] [address], toll free: [phone], option 7

## 2021-06-22 NOTE — TELEPHONE ENCOUNTER
Christopher Ramires RN  [facility] 5 hours ago (11:27 AM)   JA  Patient is agreeable to a call to set up a telephone appointment to review medications.  Polypharmacy with DM history.  Thank You.

## 2021-06-24 ENCOUNTER — SCHEDULED TELEPHONE ENCOUNTER (OUTPATIENT)
Dept: PHARMACY | Facility: CLINIC | Age: 76
End: 2021-06-24

## 2021-06-24 DIAGNOSIS — Z79.899 ENCOUNTER FOR MEDICATION REVIEW: Primary | ICD-10-CM

## 2021-06-24 PROCEDURE — 1111F DSCHRG MED/CURRENT MED MERGE: CPT | Performed by: NURSE PRACTITIONER

## 2021-06-24 RX ORDER — LEVOTHYROXINE SODIUM 0.05 MG/1
50 TABLET ORAL DAILY
COMMUNITY
End: 2022-05-06 | Stop reason: SDUPTHER

## 2021-06-24 RX ORDER — LISINOPRIL 10 MG/1
10 TABLET ORAL 2 TIMES DAILY
COMMUNITY
End: 2021-06-24 | Stop reason: ALTCHOICE

## 2021-06-24 RX ORDER — ROSUVASTATIN CALCIUM 40 MG/1
40 TABLET, COATED ORAL EVERY EVENING
COMMUNITY
End: 2022-05-02 | Stop reason: SDUPTHER

## 2021-06-24 RX ORDER — CLONIDINE HYDROCHLORIDE 0.1 MG/1
0.1 TABLET ORAL PRN
COMMUNITY

## 2021-06-24 RX ORDER — ROSUVASTATIN CALCIUM 20 MG/1
20 TABLET, COATED ORAL DAILY
COMMUNITY
End: 2021-06-24 | Stop reason: DRUGHIGH

## 2021-06-24 RX ORDER — LORATADINE 10 MG/1
10 TABLET ORAL DAILY
COMMUNITY

## 2021-06-24 RX ORDER — OMEGA-3-ACID ETHYL ESTERS 1 G/1
2 CAPSULE, LIQUID FILLED ORAL 2 TIMES DAILY
COMMUNITY

## 2021-06-24 RX ORDER — ANTIARTHRITIC COMBINATION NO.2 900 MG
5000 TABLET ORAL DAILY
COMMUNITY
End: 2021-06-30

## 2021-06-24 RX ORDER — THIAMINE HCL 100 MG
1000 TABLET ORAL 2 TIMES DAILY
COMMUNITY

## 2021-06-24 RX ORDER — MULTIVIT WITH MINERALS/LUTEIN
1000 TABLET ORAL DAILY
COMMUNITY
End: 2021-06-30

## 2021-06-29 ENCOUNTER — CARE COORDINATION (OUTPATIENT)
Dept: CASE MANAGEMENT | Age: 76
End: 2021-06-29

## 2021-06-30 ENCOUNTER — OFFICE VISIT (OUTPATIENT)
Dept: FAMILY MEDICINE CLINIC | Age: 76
End: 2021-06-30
Payer: MEDICARE

## 2021-06-30 VITALS
HEIGHT: 70 IN | WEIGHT: 247.2 LBS | BODY MASS INDEX: 35.39 KG/M2 | TEMPERATURE: 97.4 F | SYSTOLIC BLOOD PRESSURE: 138 MMHG | DIASTOLIC BLOOD PRESSURE: 78 MMHG | HEART RATE: 75 BPM | OXYGEN SATURATION: 96 %

## 2021-06-30 DIAGNOSIS — N18.31 STAGE 3A CHRONIC KIDNEY DISEASE (HCC): ICD-10-CM

## 2021-06-30 DIAGNOSIS — E11.22 TYPE 2 DIABETES MELLITUS WITH STAGE 3A CHRONIC KIDNEY DISEASE, WITH LONG-TERM CURRENT USE OF INSULIN (HCC): ICD-10-CM

## 2021-06-30 DIAGNOSIS — N18.31 TYPE 2 DIABETES MELLITUS WITH STAGE 3A CHRONIC KIDNEY DISEASE, WITH LONG-TERM CURRENT USE OF INSULIN (HCC): ICD-10-CM

## 2021-06-30 DIAGNOSIS — Z95.5 H/O RIGHT CORONARY ARTERY STENT PLACEMENT: ICD-10-CM

## 2021-06-30 DIAGNOSIS — Z79.4 TYPE 2 DIABETES MELLITUS WITH STAGE 3A CHRONIC KIDNEY DISEASE, WITH LONG-TERM CURRENT USE OF INSULIN (HCC): ICD-10-CM

## 2021-06-30 DIAGNOSIS — M15.9 PRIMARY OSTEOARTHRITIS INVOLVING MULTIPLE JOINTS: ICD-10-CM

## 2021-06-30 DIAGNOSIS — I25.10 CORONARY ARTERY DISEASE INVOLVING NATIVE CORONARY ARTERY OF NATIVE HEART WITHOUT ANGINA PECTORIS: Primary | ICD-10-CM

## 2021-06-30 DIAGNOSIS — I65.21 STENOSIS OF RIGHT CAROTID ARTERY: ICD-10-CM

## 2021-06-30 DIAGNOSIS — K21.9 GASTROESOPHAGEAL REFLUX DISEASE WITHOUT ESOPHAGITIS: ICD-10-CM

## 2021-06-30 DIAGNOSIS — I10 ESSENTIAL HYPERTENSION: ICD-10-CM

## 2021-06-30 PROCEDURE — 1111F DSCHRG MED/CURRENT MED MERGE: CPT | Performed by: NURSE PRACTITIONER

## 2021-06-30 PROCEDURE — 99495 TRANSJ CARE MGMT MOD F2F 14D: CPT | Performed by: NURSE PRACTITIONER

## 2021-06-30 ASSESSMENT — ENCOUNTER SYMPTOMS
NAUSEA: 0
CHEST TIGHTNESS: 0
VOMITING: 0
TROUBLE SWALLOWING: 0
ABDOMINAL PAIN: 0
DIARRHEA: 0
BLOOD IN STOOL: 0
COUGH: 0
VOICE CHANGE: 0
BACK PAIN: 0
WHEEZING: 0
CONSTIPATION: 0
SHORTNESS OF BREATH: 0

## 2021-06-30 ASSESSMENT — PATIENT HEALTH QUESTIONNAIRE - PHQ9
SUM OF ALL RESPONSES TO PHQ9 QUESTIONS 1 & 2: 0
SUM OF ALL RESPONSES TO PHQ QUESTIONS 1-9: 0
1. LITTLE INTEREST OR PLEASURE IN DOING THINGS: 0
2. FEELING DOWN, DEPRESSED OR HOPELESS: 0

## 2021-06-30 NOTE — PROGRESS NOTES
Post-Discharge Transitional Care Management Services or Hospital Follow Up      [address]   YOB: 1945    Date of Office Visit:  6/30/2021  Date of Hospital Admission: 6/17/21  Date of Hospital Discharge: 6/19/21  Readmission Risk Score(high >=14%.  Medium >=10%):Readmission Risk Score: 17      Care management risk score Rising risk (score 2-5) and Complex Care (Scores >=6): 6     Non face to face  following discharge, date last encounter closed (first attempt may have been earlier): 6/22/2021 11:27 AM 6/22/2021 11:27 AM    Call initiated 2 business days of discharge: Yes     Patient Active Problem List   Diagnosis    History of knee surgery    Mass of thyroid region    Presence of artificial knee joint, right    Anemia in chronic kidney disease    Hypertension    Stage 3a chronic kidney disease    Hematuria    Hyperkalemia    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, or unspecified    Malignant neoplasm of prostate ([address]    Non-toxic multinodular goiter    Postoperative retention of urine    Stenosis of carotid artery    Diabetes mellitus ([address]    Hyperparathyroidism ([address]    Morbidly obese ([address]    Acute retention of urine    CAD (coronary artery disease)    GERD (gastroesophageal reflux disease)    Hyperlipidemia    Vitamin D deficiency    Contact dermatitis due to radiation    Neoplasm of soft tissue    Rhinophyma    Rosacea    Diverticular disease of colon    DJD (degenerative joint disease), multiple sites    Goiter    Hypothyroidism    Hx of malignant neoplasm of prostate    H/O thyroid nodule    CAD in native artery    Arteriosclerosis of coronary artery       No Known Allergies    Medications listed as ordered at the time of discharge from hospital   C[facility] Medication Instructions SHEA:    Printed on:06/30/21 1514   Medication Information                      Ascorbic Acid (VITAMIN C) 1000 MG tablet  Take 1,000 mg by mouth daily             aspirin 81 MG tablet  Take 81 mg by mouth daily              B Complex Vitamins (B COMPLEX 1 PO)  Take 1 tablet by mouth daily              blood glucose test strips (ONETOUCH ULTRA) strip  1 each by Does not apply route 2 times daily             carvedilol (COREG) 25 MG tablet  TAKE 1 TABLET BY MOUTH 2  TIMES DAILY (WITH MEALS)             Cholecalciferol (VITAMIN D3) 50 MCG (2000 UT) CAPS  Take 2,000 Units by mouth daily              cloNIDine (CATAPRES) 0.1 MG tablet  Take 0.1 mg by mouth every evening             Garlic 310 MG CAPS  Take 1,000 mg by mouth 2 times daily              Insulin Degludec (TRESIBA FLEXTOUCH) 200 UNIT/ML SOPN  Inject 55 Units into the skin daily (with breakfast)             insulin lispro (HUMALOG) 100 UNIT/ML injection vial  Inject 1 Units into the skin 3 times daily as needed for High Blood Sugar Sliding Scale as needed             isosorbide mononitrate (IMDUR) 60 MG extended release tablet  Take 1.5 tablets by mouth daily             levothyroxine (SYNTHROID) 50 MCG tablet  Take 50 mcg by mouth Daily             loratadine (CLARITIN) 10 MG tablet  Take 10 mg by mouth daily             Magnesium 500 MG TABS  Take 1,000 mg by mouth 2 times daily             nitroGLYCERIN (NITROSTAT) 0.4 MG SL tablet  Place 1 tablet under the tongue every 5 minutes as needed for Chest pain up to max of 3 total doses. If no relief after 1 dose, call 911.              omega-3 acid ethyl esters (LOVAZA) 1 g capsule  Take 2 g by mouth 2 times daily             omeprazole (PRILOSEC) 20 MG delayed release capsule  TAKE 1 CAPSULE ORALLY ONCE  PER DAY             Psyllium (METAMUCIL FIBER PO)  Take 1 capsule by mouth daily             ranolazine (RANEXA) 1000 MG extended release tablet  Take 1 tablet by mouth 2 times daily             rosuvastatin (CRESTOR) 40 MG tablet  Take 40 mg by mouth every evening             sacubitril-valsartan (ENTRESTO) 24-26 MG per tablet  Take 1 tablet by mouth 2 times daily             tamsulosin (FLOMAX) 0.4 MG capsule  TAKE 1 CAPSULE BY MOUTH ONCE DAILY AT BEDTIME             ticagrelor (BRILINTA) 90 MG TABS tablet  Take 1 tablet by mouth 2 times daily             TURMERIC PO  Take 500 capsules by mouth daily             zinc sulfate (ZINCATE) 220 (50 Zn) MG capsule  Take 50 mg by mouth daily                    Medications marked \"taking\" at this time  Outpatient Medications Marked as Taking for the 6/30/21 encounter (Office Visit) with VERENICE Paul - CNP   Medication Sig Dispense Refill    levothyroxine (SYNTHROID) 50 MCG tablet Take 50 mcg by mouth Daily      cloNIDine (CATAPRES) 0.1 MG tablet Take 0.1 mg by mouth every evening      omega-3 acid ethyl esters (LOVAZA) 1 g capsule Take 2 g by mouth 2 times daily      Magnesium 500 MG TABS Take 1,000 mg by mouth 2 times daily      Psyllium (METAMUCIL FIBER PO) Take 1 capsule by mouth daily      loratadine (CLARITIN) 10 MG tablet Take 10 mg by mouth daily      rosuvastatin (CRESTOR) 40 MG tablet Take 40 mg by mouth every evening      sacubitril-valsartan (ENTRESTO) 24-26 MG per tablet Take 1 tablet by mouth 2 times daily 60 tablet 1    ticagrelor (BRILINTA) 90 MG TABS tablet Take 1 tablet by mouth 2 times daily 60 tablet 1    isosorbide mononitrate (IMDUR) 60 MG extended release tablet Take 1.5 tablets by mouth daily 180 tablet 1    ranolazine (RANEXA) 1000 MG extended release tablet Take 1 tablet by mouth 2 times daily 180 tablet 1    carvedilol (COREG) 25 MG tablet TAKE 1 TABLET BY MOUTH 2  TIMES DAILY (WITH MEALS) 180 tablet 1    omeprazole (PRILOSEC) 20 MG delayed release capsule TAKE 1 CAPSULE ORALLY ONCE  PER DAY 90 capsule 1    blood glucose test strips (ONETOUCH ULTRA) strip 1 each by Does not apply route 2 times daily 100 strip 5    Insulin Degludec (TRESIBA FLEXTOUCH) 200 UNIT/ML SOPN Inject 55 Units into the skin daily (with breakfast)      tamsulosin (FLOMAX) 0.4 MG capsule TAKE 1 CAPSULE BY MOUTH ONCE DAILY AT BEDTIME      insulin lispro (HUMALOG) 100 UNIT/ML injection vial Inject 1 Units into the skin 3 times daily as needed for High Blood Sugar Sliding Scale as needed      nitroGLYCERIN (NITROSTAT) 0.4 MG SL tablet Place 1 tablet under the tongue every 5 minutes as needed for Chest pain up to max of 3 total doses. If no relief after 1 dose, call 911. 25 tablet 0    Garlic 855 MG CAPS Take 1,000 mg by mouth 2 times daily       Cholecalciferol (VITAMIN D3) 50 MCG (2000 UT) CAPS Take 2,000 Units by mouth daily       Ascorbic Acid (VITAMIN C) 1000 MG tablet Take 1,000 mg by mouth daily      zinc sulfate (ZINCATE) 220 (50 Zn) MG capsule Take 50 mg by mouth daily       TURMERIC PO Take 500 capsules by mouth daily      B Complex Vitamins (B COMPLEX 1 PO) Take 1 tablet by mouth daily       aspirin 81 MG tablet Take 81 mg by mouth daily           Medications patient taking as of now reconciled against medications ordered at time of hospital discharge: Yes    Chief Complaint   Patient presents with    Follow-Up from Hospital     Discharged on 6/20/2021, 3 stents put in.        patient denies feeling much better but he feels slightly better after 3 stents placed. No complications or worsening reported and is doing well otherwise      Inpatient course: Discharge summary reviewed- see chart. Interval history/Current status: Stable    Review of Systems   Constitutional: Negative for activity change, chills, diaphoresis, fatigue, fever and unexpected weight change. HENT: Negative for trouble swallowing and voice change. Eyes: Negative for visual disturbance. Respiratory: Negative for cough, chest tightness, shortness of breath and wheezing. Cardiovascular: Negative for chest pain, palpitations and leg swelling. Gastrointestinal: Negative for abdominal pain, blood in stool, constipation, diarrhea, nausea and vomiting.    Endocrine: Negative for polydipsia, polyphagia and polyuria. Genitourinary: Negative for dysuria, enuresis, frequency and hematuria. Musculoskeletal: Negative for arthralgias, back pain, gait problem, joint swelling, myalgias and neck stiffness. Skin: Negative for rash. Neurological: Negative for dizziness, seizures, syncope, facial asymmetry, weakness, light-headedness, numbness and headaches. Hematological: Does not bruise/bleed easily. Psychiatric/Behavioral: Negative for behavioral problems, confusion, hallucinations and suicidal ideas. The patient is not nervous/anxious. Vitals:    06/30/21 1451   BP: 138/78   Site: Left Upper Arm   Position: Sitting   Cuff Size: Medium Adult   Pulse: 75   Temp: 97.4 °F (36.3 °C)   TempSrc: Temporal   SpO2: 96%   Weight: 247 lb 3.2 oz (112.1 kg)   Height: 5' 10\" (1.778 m)     Body mass index is 35.47 kg/m². Wt Readings from Last 3 Encounters:   06/30/21 247 lb 3.2 oz (112.1 kg)   06/19/21 250 lb (113.4 kg)   02/11/21 244 lb (110.7 kg)     BP Readings from Last 3 Encounters:   06/30/21 138/78   06/19/21 (!) 177/87   02/11/21 130/80       Physical Exam  Vitals and nursing note reviewed. Constitutional:       Appearance: Normal appearance. HENT:      Head: Normocephalic. Right Ear: Tympanic membrane and ear canal normal. There is no impacted cerumen. Left Ear: Tympanic membrane and ear canal normal. There is no impacted cerumen. Nose: Nose normal.      Mouth/Throat:      Mouth: Mucous membranes are dry. Eyes:      Extraocular Movements: Extraocular movements intact. Pupils: Pupils are equal, round, and reactive to light. Neck:      Vascular: No carotid bruit. Cardiovascular:      Rate and Rhythm: Normal rate and regular rhythm. Pulses: Normal pulses. Heart sounds: Normal heart sounds. No murmur heard. No friction rub. No gallop. Pulmonary:      Effort: Pulmonary effort is normal. No respiratory distress.       Breath sounds:

## 2021-06-30 NOTE — PATIENT INSTRUCTIONS
Patient Education        Learning About Coronary Artery Disease (CAD)  What is coronary artery disease? Coronary artery disease is a condition that occurs when plaque builds up in the arteries that bring oxygen-rich blood to your heart. Plaque is a fatty substance made of cholesterol, calcium, and other substances in the blood. This process is called hardening of the arteries, or atherosclerosis. What happens when you have coronary artery disease? · Plaque may narrow the coronary arteries. Narrowed arteries cause poor blood flow. This can lead to angina symptoms such as chest pain or discomfort. If blood flow is completely blocked, you could have a heart attack. · You can slow and reduce the risk of future problems by making changes in your lifestyle. These include quitting smoking and eating heart-healthy foods. · Treatment, along with changes in your lifestyle, can help you live a longer and healthier life. How can you prevent coronary artery disease? · Do not smoke. It may be the best thing you can do to prevent coronary artery disease. If you need help quitting, talk to your doctor about stop-smoking programs and medicines. These can increase your chances of quitting for good. · Be active. Try to do moderate activity at least 2½ hours a week. Or try to do vigorous activity at least 1¼ hours a week. You may want to walk or try other activities, such as running, swimming, cycling, or playing tennis or team sports. · Eat heart-healthy foods. Eat more fruits and vegetables and less food that contains saturated and trans fats. Limit alcohol, sodium, and sweets. · Stay at a healthy weight. Lose weight if you need to. · Manage other health problems such as diabetes, high blood pressure, and high cholesterol. How is coronary artery disease treated? · Your doctor will suggest that you make lifestyle changes.  For example, your doctor may ask you to eat healthy foods, quit smoking, lose extra weight, and be more active. · You will take medicines that help prevent a heart attack. · Your doctor may suggest a procedure to open narrowed or blocked arteries. This is called angioplasty. Or your doctor may suggest using healthy blood vessels to create detours around narrowed or blocked arteries. This is called bypass surgery. Follow-up care is a key part of your treatment and safety. Be sure to make and go to all appointments, and call your doctor if you are having problems. It's also a good idea to know your test results and keep a list of the medicines you take. Where can you learn more? Go to https://[email or web address] [place]. org and sign in to your [employer] account. Enter [phone] in the [employer] box to learn more about \"Learning About Coronary Artery Disease (CAD). \"     If you do not have an account, please click on the \"Sign Up Now\" link. Current as of: August 31, 2020               Content Version: 12.9  © 2006-2021 [employer]. Care instructions adapted under license by [facility] ([facility]). If you have questions about a medical condition or this instruction, always ask your healthcare professional. [place] [address] any warranty or liability for your use of this information.

## 2021-07-09 ENCOUNTER — CARE COORDINATION (OUTPATIENT)
Dept: CARE COORDINATION | Age: 76
End: 2021-07-09

## 2021-07-09 NOTE — CARE COORDINATION
[facility] Transitions Follow Up Call    2021    Patient: Kira Corrales  Patient : 1945   MRN: <[id number]>  Reason for Admission: CAD in native artery  Discharge Date: 21 RARS: Readmission Risk Score: 17         Spoke with: Patient reports \"I'm doing good, I have no problems\". Denies chest pain, sob or palpitations. Pt is independent with all ADL's and denies using assistive device for ambulation. Denies getting sob on exertion and denies any dizziness. Reports he is compliant and tolerating all meds. Eating, drinking and eliminating bowel/bladder with no issues. Denies any concerns. Pt has f/u with PCP next month. Pt aware final care transition call (per CTN's last note) and in agreement. Care Transitions Subsequent and Final Call    Subsequent and Final Calls  Do you have any ongoing symptoms?: No  Have your medications changed?: No  Do you have any questions related to your medications?: No  Do you currently have any active services?: No  Do you have any needs or concerns that I can assist you with?: No  Identified Barriers: None  Care Transitions Interventions  No Identified Needs  Other Interventions:            Follow Up  Future Appointments   Date Time Provider Renata Cordova   2021 10:30 AM VERENICE Navas CNP, Edd, RN

## 2021-08-02 RX ORDER — ISOSORBIDE MONONITRATE 60 MG/1
60 TABLET, EXTENDED RELEASE ORAL DAILY
Qty: 90 TABLET | Refills: 1 | Status: SHIPPED
Start: 2021-08-02 | End: 2022-02-01 | Stop reason: SDUPTHER

## 2021-09-01 ENCOUNTER — OFFICE VISIT (OUTPATIENT)
Dept: FAMILY MEDICINE CLINIC | Age: 76
End: 2021-09-01
Payer: MEDICARE

## 2021-09-01 VITALS
WEIGHT: 252.2 LBS | OXYGEN SATURATION: 99 % | HEART RATE: 60 BPM | RESPIRATION RATE: 18 BRPM | SYSTOLIC BLOOD PRESSURE: 124 MMHG | BODY MASS INDEX: 36.11 KG/M2 | HEIGHT: 70 IN | DIASTOLIC BLOOD PRESSURE: 68 MMHG | TEMPERATURE: 96.8 F

## 2021-09-01 DIAGNOSIS — Z85.46 HX OF MALIGNANT NEOPLASM OF PROSTATE: ICD-10-CM

## 2021-09-01 DIAGNOSIS — I10 ESSENTIAL HYPERTENSION: ICD-10-CM

## 2021-09-01 DIAGNOSIS — I25.10 CORONARY ARTERY DISEASE INVOLVING NATIVE CORONARY ARTERY OF NATIVE HEART WITHOUT ANGINA PECTORIS: ICD-10-CM

## 2021-09-01 DIAGNOSIS — K57.30 DIVERTICULAR DISEASE OF COLON: ICD-10-CM

## 2021-09-01 DIAGNOSIS — M15.9 PRIMARY OSTEOARTHRITIS INVOLVING MULTIPLE JOINTS: ICD-10-CM

## 2021-09-01 DIAGNOSIS — Z95.5 H/O RIGHT CORONARY ARTERY STENT PLACEMENT: ICD-10-CM

## 2021-09-01 DIAGNOSIS — E55.9 VITAMIN D DEFICIENCY: ICD-10-CM

## 2021-09-01 DIAGNOSIS — Z00.00 ROUTINE GENERAL MEDICAL EXAMINATION AT A HEALTH CARE FACILITY: Primary | ICD-10-CM

## 2021-09-01 DIAGNOSIS — I65.21 STENOSIS OF RIGHT CAROTID ARTERY: ICD-10-CM

## 2021-09-01 DIAGNOSIS — E11.22 TYPE 2 DIABETES MELLITUS WITH STAGE 3A CHRONIC KIDNEY DISEASE, WITH LONG-TERM CURRENT USE OF INSULIN (HCC): ICD-10-CM

## 2021-09-01 DIAGNOSIS — N18.31 TYPE 2 DIABETES MELLITUS WITH STAGE 3A CHRONIC KIDNEY DISEASE, WITH LONG-TERM CURRENT USE OF INSULIN (HCC): ICD-10-CM

## 2021-09-01 DIAGNOSIS — Z79.4 TYPE 2 DIABETES MELLITUS WITH STAGE 3A CHRONIC KIDNEY DISEASE, WITH LONG-TERM CURRENT USE OF INSULIN (HCC): ICD-10-CM

## 2021-09-01 DIAGNOSIS — K21.9 GASTROESOPHAGEAL REFLUX DISEASE WITHOUT ESOPHAGITIS: ICD-10-CM

## 2021-09-01 DIAGNOSIS — E03.9 HYPOTHYROIDISM, UNSPECIFIED TYPE: ICD-10-CM

## 2021-09-01 PROCEDURE — G0439 PPPS, SUBSEQ VISIT: HCPCS | Performed by: NURSE PRACTITIONER

## 2021-09-01 PROCEDURE — 4040F PNEUMOC VAC/ADMIN/RCVD: CPT | Performed by: NURSE PRACTITIONER

## 2021-09-01 PROCEDURE — 1123F ACP DISCUSS/DSCN MKR DOCD: CPT | Performed by: NURSE PRACTITIONER

## 2021-09-01 SDOH — ECONOMIC STABILITY: FOOD INSECURITY: WITHIN THE PAST 12 MONTHS, YOU WORRIED THAT YOUR FOOD WOULD RUN OUT BEFORE YOU GOT MONEY TO BUY MORE.: NEVER TRUE

## 2021-09-01 SDOH — ECONOMIC STABILITY: FOOD INSECURITY: WITHIN THE PAST 12 MONTHS, THE FOOD YOU BOUGHT JUST DIDN'T LAST AND YOU DIDN'T HAVE MONEY TO GET MORE.: NEVER TRUE

## 2021-09-01 ASSESSMENT — PATIENT HEALTH QUESTIONNAIRE - PHQ9
SUM OF ALL RESPONSES TO PHQ9 QUESTIONS 1 & 2: 0
1. LITTLE INTEREST OR PLEASURE IN DOING THINGS: 0
2. FEELING DOWN, DEPRESSED OR HOPELESS: 0
SUM OF ALL RESPONSES TO PHQ QUESTIONS 1-9: 0

## 2021-09-01 ASSESSMENT — SOCIAL DETERMINANTS OF HEALTH (SDOH): HOW HARD IS IT FOR YOU TO PAY FOR THE VERY BASICS LIKE FOOD, HOUSING, MEDICAL CARE, AND HEATING?: NOT HARD AT ALL

## 2021-09-01 NOTE — PROGRESS NOTES
Medicare Annual Wellness Visit  Name: Amadou Mahajan Date: 2021   MRN: [id number] Sex: Male   Age: 67 y.o. Ethnicity: Non- / Non    : 1945 Race: White (non-)      Lisa Grant is here for Medicare AWV    Screenings for behavioral, psychosocial and functional/safety risks, and cognitive dysfunction are all negative except as indicated below. These results, as well as other patient data from the [address] form, are documented in Flowsheets linked to this Encounter. No Known Allergies    Prior to Visit Medications    Medication Sig Taking?  Authorizing Provider   isosorbide mononitrate (IMDUR) 60 MG extended release tablet Take 1 tablet by mouth daily  Patient taking differently: Take 60 mg by mouth daily Pt is taking 1/2 tablet two times Daily Yes VERENICE Del Valle CNP   levothyroxine (SYNTHROID) 50 MCG tablet Take 50 mcg by mouth Daily Yes Historical Provider, MD   cloNIDine (CATAPRES) 0.1 MG tablet Take 0.1 mg by mouth as needed Pt is only taking PRN Yes Historical Provider, MD   omega-3 acid ethyl esters (LOVAZA) 1 g capsule Take 2 g by mouth 2 times daily Yes Historical Provider, MD   Magnesium 500 MG TABS Take 1,000 mg by mouth 2 times daily Pt is taking 2 tablets in the am and 1 tablet in the evening for a total os 1500 mg Yes Historical Provider, MD   Psyllium (METAMUCIL FIBER PO) Take 1 capsule by mouth daily Yes Historical Provider, MD   loratadine (CLARITIN) 10 MG tablet Take 10 mg by mouth daily Yes Historical Provider, MD   rosuvastatin (CRESTOR) 40 MG tablet Take 40 mg by mouth every evening Yes Historical Provider, MD   sacubitril-valsartan (ENTRESTO) 24-26 MG per tablet Take 1 tablet by mouth 2 times daily Yes VERENICE Dupont CNP   ticagrelor (BRILINTA) 90 MG TABS tablet Take 1 tablet by mouth 2 times daily Yes VERENICE Dupont CNP   ranolazine (RANEXA) 1000 MG extended release tablet Take 1 tablet by mouth 2 times daily Yes Salvatoreryan OwenVERENICE timmons - CNP   carvedilol (COREG) 25 MG tablet TAKE 1 TABLET BY MOUTH 2  TIMES DAILY (WITH MEALS) Yes Rod OwenVERENICE timmons - CNP   omeprazole (PRILOSEC) 20 MG delayed release capsule TAKE 1 CAPSULE ORALLY ONCE  PER DAY Yes Rod Patterson APRN - CNP   blood glucose test strips (ONETOUCH ULTRA) strip 1 each by Does not apply route 2 times daily Yes Rod OwenVERENICE timmons - CNP   Insulin Degludec (TRESIBA FLEXTOUCH) 200 UNIT/ML SOPN Inject 55 Units into the skin daily (with breakfast) Yes Historical Provider, MD   tamsulosin (FLOMAX) 0.4 MG capsule TAKE 1 CAPSULE BY MOUTH ONCE DAILY AT BEDTIME Yes Historical Provider, MD   insulin lispro (HUMALOG) 100 UNIT/ML injection vial Inject 1 Units into the skin 3 times daily as needed for High Blood Sugar Sliding Scale as needed Yes Historical Provider, MD   Garlic 343 MG CAPS Take 500 mg by mouth 2 times daily  Yes Historical Provider, MD   Cholecalciferol (VITAMIN D3) 50 MCG (2000 UT) CAPS Take 2,000 Units by mouth daily  Yes Historical Provider, MD   Ascorbic Acid (VITAMIN C) 1000 MG tablet Take 1,000 mg by mouth daily Yes Historical Provider, MD   zinc sulfate (ZINCATE) 220 (50 Zn) MG capsule Take 50 mg by mouth daily  Yes Historical Provider, MD   B Complex Vitamins (B COMPLEX 1 PO) Take 1 tablet by mouth daily  Yes Historical Provider, MD   aspirin 81 MG tablet Take 81 mg by mouth daily  Yes Historical Provider, MD   nitroGLYCERIN (NITROSTAT) 0.4 MG SL tablet Place 1 tablet under the tongue every 5 minutes as needed for Chest pain up to max of 3 total doses. If no relief after 1 dose, call 911.   Rod WeaverVERENICE timmons - CNP   TURMERIC PO Take 500 capsules by mouth daily  Patient not taking: Reported on 9/1/2021  Historical Provider, MD       Past Medical History:   Diagnosis Date    CAD (coronary artery disease)     Cancer ([facility] [address]     skin    Diabetes mellitus ([facility] [address]     Diverticular disease of colon     DJD (degenerative joint disease), multiple sites     GERD (gastroesophageal reflux disease)     Goiter     H/O thyroid nodule     Hx of malignant neoplasm of prostate     Hyperlipidemia     Hypertension     Hypothyroidism     Status post insertion of drug eluting coronary artery stent 06/18/2021    SVG to OM 1       Past Surgical History:   Procedure Laterality Date    CAROTID ENDARTERECTOMY  05/25/2017    CHOLECYSTECTOMY      COLONOSCOPY      CORONARY ANGIOPLASTY WITH STENT PLACEMENT  06/17/2021    DR. [facility], STENT TO RIGHT PDA 2/12 LISBETH    CORONARY ARTERY BYPASS GRAFT  10/11/2001    HERNIA REPAIR      SKIN CANCER EXCISION      MELONOMA    THYROID SURGERY  09/01/2017    BX    TOTAL KNEE ARTHROPLASTY Left 12/17/2018       Family History   Problem Relation Age of Onset    Diabetes Mother     Cancer Mother     Heart Attack Father     Coronary Art Dis Father        CareTeam (Including outside providers/suppliers regularly involved in providing care):   Patient Care Team:  VERENICE Herrmann CNP as PCP - General (Nurse Practitioner)  VERENICE Herrmann CNP as PCP - [facility] Empaneled Provider  Alida Ravi MD as Consulting Physician (Nephrology)    Wt Readings from Last 3 Encounters:   09/01/21 252 lb 3.2 oz (114.4 kg)   06/30/21 247 lb 3.2 oz (112.1 kg)   06/19/21 250 lb (113.4 kg)     Vitals:    09/01/21 1048   BP: 124/68   Pulse: 60   Resp: 18   Temp: 96.8 °F (36 °C)   TempSrc: Temporal   SpO2: 99%   Weight: 252 lb 3.2 oz (114.4 kg)   Height: 5' 10\" (1.778 m)     Body mass index is 36.19 kg/m². Based upon direct observation of the patient, evaluation of cognition reveals recent and remote memory intact.     General Appearance: alert and oriented to person, place and time, well developed and well- nourished, in no acute distress  Skin: warm and dry, no rash or erythema  Head: normocephalic and atraumatic  Eyes: pupils equal, round, and reactive to light, extraocular eye movements intact, conjunctivae normal  ENT: tympanic membrane, external ear and ear canal normal bilaterally, nose without deformity, nasal mucosa and turbinates normal without polyps  Neck: supple and non-tender without mass, no thyromegaly or thyroid nodules, no cervical lymphadenopathy  Pulmonary/Chest: clear to auscultation bilaterally- no wheezes, rales or rhonchi, normal air movement, no respiratory distress  Cardiovascular: normal rate, regular rhythm, normal S1 and S2, no murmurs, rubs, clicks, or gallops, distal pulses intact, no carotid bruits  Abdomen: soft, non-tender, non-distended, normal bowel sounds, no masses or organomegaly  Extremities: no cyanosis, clubbing or edema  Musculoskeletal: normal range of motion, no joint swelling, deformity or tenderness  Neurologic: reflexes normal and symmetric, no cranial nerve deficit, gait, coordination and speech normal    Patient's complete Health Risk Assessment and screening values have been reviewed and are found in Flowsheets. The following problems were reviewed today and where indicated follow up appointments were made and/or referrals ordered.     Positive Risk Factor Screenings with Interventions:           Health Habits/Nutrition:     Body mass index: (!) 36.18  Health Habits/Nutrition Interventions:  · Dental exam overdue:  patient encouraged to make appointment with his/her dentist       Personalized Preventive Plan   Current Health Maintenance Status  Immunization History   Administered Date(s) Administered    COVID-19, Moderna, PF, 100mcg/0.5mL 02/05/2021, 03/05/2021    Influenza Virus Vaccine 11/05/2014, 09/04/2015, 09/02/2016, 09/14/2017, 09/19/2018, 10/17/2019    Influenza, High Dose (Fluzone 65 yrs and older) 03/02/2017, 10/10/2019, 02/13/2020, 10/02/2020    Influenza, High-dose, Quadv, 65 yrs +, IM (Fluzone) 10/02/2020    Pneumococcal Polysaccharide ([id number]) 04/13/2015, 09/23/2016    Td (Adult), 2 Lf Tetanus Toxoid, Pf (Td, Absorbed) 07/01/2013    Td vaccine (adult) 03/04/2016    Zoster Recombinant (Shingrix) 11/07/2020, 01/12/2021        Health Maintenance   Topic Date Due    DTaP/Tdap/Td vaccine (1 - Tdap) 03/05/2016    Annual Wellness Visit (AWV)  Never done    Flu vaccine (1) 09/01/2021    PSA counseling  12/18/2021    TSH testing  06/14/2022    Lipid screen  06/18/2022    Potassium monitoring  06/19/2022    Creatinine monitoring  06/19/2022    Shingles Vaccine  Completed    Pneumococcal 65+ years Vaccine  Completed    COVID-19 Vaccine  Completed    Hepatitis C screen  Completed    Hepatitis A vaccine  Aged Out    Hib vaccine  Aged Out    Meningococcal (ACWY) vaccine  Aged Out     Recommendations for [employer] Due: see orders and patient instructions/AVS.  . Recommended screening schedule for the next 5-10 years is provided to the patient in written form: see Patient Instructions/AVS.    There are no diagnoses linked to this encounter. Cardiovascular Disease Risk Counseling: Assessed the patient's risk to develop cardiovascular disease and reviewed main risk factors. Reviewed steps to reduce disease risk including:   · Quitting tobacco use, reducing amount smoked, or not starting the habit  · Making healthy food choices  · Being physically active and gradualy increasing activity levels   · Reduce weight and determine a healthy BMI goal  · Monitor blood pressure and treat if higher than 140/90 mmHg  · Maintain blood total cholesterol levels under 5 mmol/l or 190 mg/dl  · Maintain LDL cholesterol levels under 3.0 mmol/l or 115 mg/dl   · Control blood glucose levels  · Consider taking aspirin (75 mg daily), once blood pressure is controlled   Provided a follow up plan. Time spent (minutes): 13    Obesity Counseling: Assessed behavioral health risks and factors affecting choice of behavior. Suggested weight control approaches, including dietary changes behavioral modification and follow up plan.  Provided educational and support documentation.   Time spent (minutes): 10          Electronically signed by VERENICE Roe CNP on 9/1/2021 at 11:08 AM

## 2021-09-01 NOTE — PATIENT INSTRUCTIONS
Body Mass Index: Care Instructions  Your Care Instructions     Body mass index (BMI) can help you see if your weight is raising your risk for health problems. It uses a formula to compare how much you weigh with how tall you are. · A BMI lower than 18.5 is considered underweight. · A BMI between 18.5 and 24.9 is considered healthy. · A BMI between 25 and 29.9 is considered overweight. A BMI of 30 or higher is considered obese. If your BMI is in the normal range, it means that you have a lower risk for weight-related health problems. If your BMI is in the overweight or obese range, you may be at increased risk for weight-related health problems, such as high blood pressure, heart disease, stroke, arthritis or joint pain, and diabetes. If your BMI is in the underweight range, you may be at increased risk for health problems such as fatigue, lower protection (immunity) against illness, muscle loss, bone loss, hair loss, and hormone problems. BMI is just one measure of your risk for weight-related health problems. You may be at higher risk for health problems if you are not active, you eat an unhealthy diet, or you drink too much alcohol or use tobacco products. Follow-up care is a key part of your treatment and safety. Be sure to make and go to all appointments, and call your doctor if you are having problems. It's also a good idea to know your test results and keep a list of the medicines you take. How can you care for yourself at home? · Practice healthy eating habits. This includes eating plenty of fruits, vegetables, whole grains, lean protein, and low-fat dairy. · If your doctor recommends it, get more exercise. Walking is a good choice. Bit by bit, increase the amount you walk every day. Try for at least 30 minutes on most days of the week. · Do not smoke. Smoking can increase your risk for health problems. If you need help quitting, talk to your doctor about stop-smoking programs and medicines. These can increase your chances of quitting for good. · Limit alcohol to 2 drinks a day for men and 1 drink a day for women. Too much alcohol can cause health problems. If you have a BMI higher than 25  · Your doctor may do other tests to check your risk for weight-related health problems. This may include measuring the distance around your waist. A waist measurement of more than 40 inches in men or 35 inches in women can increase the risk of weight-related health problems. · Talk with your doctor about steps you can take to stay healthy or improve your health. You may need to make lifestyle changes to lose weight and stay healthy, such as changing your diet and getting regular exercise. If you have a BMI lower than 18.5  · Your doctor may do other tests to check your risk for health problems. · Talk with your doctor about steps you can take to stay healthy or improve your health. You may need to make lifestyle changes to gain or maintain weight and stay healthy, such as getting more healthy foods in your diet and doing exercises to build muscle. Where can you learn more? Go to https://Socket MobilewinifredNasza-klasa.pl.[employer]. org and sign in to your [employer] account. Enter S176 in the SocialEngine box to learn more about \"Body Mass Index: Care Instructions. \"     If you do not have an account, please click on the \"Sign Up Now\" link. Current as of: March 17, 2021               Content Version: 12.9  © 1382-9686 Healthwise, Inc[employer]. Care instructions adapted under license by [facility] ([facility]). If you have questions about a medical condition or this instruction, always ask your healthcare professional. [place] [address] any warranty or liability for your use of this information. DASH Diet: Care Instructions  Your Care Instructions     The DASH diet is an eating plan that can help lower your blood pressure. DASH stands for Dietary Approaches to Stop Hypertension.  Hypertension is high blood pressure. The DASH diet focuses on eating foods that are high in calcium, potassium, and magnesium. These nutrients can lower blood pressure. The foods that are highest in these nutrients are fruits, vegetables, low-fat dairy products, nuts, seeds, and legumes. But taking calcium, potassium, and magnesium supplements instead of eating foods that are high in those nutrients does not have the same effect. The DASH diet also includes whole grains, fish, and poultry. The DASH diet is one of several lifestyle changes your doctor may recommend to lower your high blood pressure. Your doctor may also want you to decrease the amount of sodium in your diet. Lowering sodium while following the DASH diet can lower blood pressure even further than just the DASH diet alone. Follow-up care is a key part of your treatment and safety. Be sure to make and go to all appointments, and call your doctor if you are having problems. It's also a good idea to know your test results and keep a list of the medicines you take. How can you care for yourself at home? Following the DASH diet  · Eat 4 to 5 servings of fruit each day. A serving is 1 medium-sized piece of fruit, ½ cup chopped or canned fruit, 1/4 cup dried fruit, or 4 ounces (½ cup) of fruit juice. Choose fruit more often than fruit juice. · Eat 4 to 5 servings of vegetables each day. A serving is 1 cup of lettuce or raw leafy vegetables, ½ cup of chopped or cooked vegetables, or 4 ounces (½ cup) of vegetable juice. Choose vegetables more often than vegetable juice. · Get 2 to 3 servings of low-fat and fat-free dairy each day. A serving is 8 ounces of milk, 1 cup of yogurt, or 1 ½ ounces of cheese. · Eat 6 to 8 servings of grains each day. A serving is 1 slice of bread, 1 ounce of dry cereal, or ½ cup of cooked rice, pasta, or cooked cereal. Try to choose whole-grain products as much as possible. · Limit lean meat, poultry, and fish to 2 servings each day.  A serving is salsa with your baked potato instead of sour cream, butter, cheese, or farrell. · Order meals in a small size instead of upgrading to a large. · Share an entree, or take part of your food home to eat as another meal.  · Share appetizers and desserts. Where can you learn more? Go to https://chpemehdieweb.[employer]. org and sign in to your [employer] account. Enter X371 in the [employer]iBeat box to learn more about \"Learning About Cutting Calories. \"     If you do not have an account, please click on the \"Sign Up Now\" link. Current as of: December 17, 2020               Content Version: 12.9  © 7905-9741 Healthwise, [place]. Care instructions adapted under license by [facility] ([facility]). If you have questions about a medical condition or this instruction, always ask your healthcare professional. [place] [address] any warranty or liability for your use of this information. Personalized Preventive Plan for Rikki Farrar - 9/1/2021  Medicare offers a range of preventive health benefits. Some of the tests and screenings are paid in full while other may be subject to a deductible, co-insurance, and/or copay. Some of these benefits include a comprehensive review of your medical history including lifestyle, illnesses that may run in your family, and various assessments and screenings as appropriate. After reviewing your medical record and screening and assessments performed today your provider may have ordered immunizations, labs, imaging, and/or referrals for you. A list of these orders (if applicable) as well as your Preventive Care list are included within your After Visit Summary for your review. Other Preventive Recommendations:    · A preventive eye exam performed by an eye specialist is recommended every 1-2 years to screen for glaucoma; cataracts, macular degeneration, and other eye disorders. · A preventive dental visit is recommended every 6 months.   · Try to get at least 150 minutes of exercise per week or 10,000 steps per day on a pedometer . · Order or download the FREE \"Exercise & Physical Activity: Your Everyday Guide\" from [employer]. Call 0-721.997.3217 or search [employer] online. · You need 4778-1991 mg of calcium and 7094-4454 IU of vitamin D per day. It is possible to meet your calcium requirement with diet alone, but a vitamin D supplement is usually necessary to meet this goal.  · When exposed to the sun, use a sunscreen that protects against both UVA and UVB radiation with an SPF of 30 or greater. Reapply every 2 to 3 hours or after sweating, drying off with a towel, or swimming. · Always wear a seat belt when traveling in a car. Always wear a helmet when riding a bicycle or motorcycle.

## 2021-12-13 ENCOUNTER — TELEPHONE (OUTPATIENT)
Dept: FAMILY MEDICINE CLINIC | Age: 76
End: 2021-12-13

## 2021-12-13 RX ORDER — RANOLAZINE 1000 MG/1
TABLET, EXTENDED RELEASE ORAL
Qty: 180 TABLET | Refills: 1 | Status: SHIPPED
Start: 2021-12-13 | End: 2022-06-17 | Stop reason: SDUPTHER

## 2021-12-13 NOTE — TELEPHONE ENCOUNTER
----- Message from Renee Flaherty sent at 12/13/2021  8:05 AM EST -----  Subject: Refill Request    QUESTIONS  Name of Medication? ranolazine (RANEXA) 1000 MG extended release tablet  Patient-reported dosage and instructions? 1000MG, TAKE 1 BY MOUTH TWICE   DAILY  How many days do you have left? 5  Preferred Pharmacy? [facility] phone number (if available)? [phone]  Additional Information for Provider? needs 3 month supply  ---------------------------------------------------------------------------  --------------  CALL BACK INFO  What is the best way for the office to contact you?  OK to leave message on   voicemail  Preferred Call Back Phone Number? [phone]

## 2021-12-27 RX ORDER — CARVEDILOL 25 MG/1
TABLET ORAL
Qty: 180 TABLET | Refills: 1 | Status: SHIPPED
Start: 2021-12-27 | End: 2022-02-01 | Stop reason: SDUPTHER

## 2021-12-27 RX ORDER — OMEPRAZOLE 20 MG/1
CAPSULE, DELAYED RELEASE ORAL
Qty: 90 CAPSULE | Refills: 1 | Status: SHIPPED
Start: 2021-12-27 | End: 2022-02-01 | Stop reason: SDUPTHER

## 2022-02-01 RX ORDER — ISOSORBIDE MONONITRATE 60 MG/1
TABLET, EXTENDED RELEASE ORAL
Qty: 90 TABLET | Refills: 1 | Status: SHIPPED
Start: 2022-02-01 | End: 2022-06-30 | Stop reason: SDUPTHER

## 2022-02-01 RX ORDER — OMEPRAZOLE 20 MG/1
CAPSULE, DELAYED RELEASE ORAL
Qty: 90 CAPSULE | Refills: 1 | Status: SHIPPED
Start: 2022-02-01 | End: 2022-08-15 | Stop reason: SDUPTHER

## 2022-02-01 RX ORDER — CARVEDILOL 25 MG/1
TABLET ORAL
Qty: 180 TABLET | Refills: 1 | Status: SHIPPED
Start: 2022-02-01 | End: 2022-06-30 | Stop reason: SDUPTHER

## 2022-02-02 DIAGNOSIS — E11.9 TYPE 2 DIABETES MELLITUS WITHOUT COMPLICATION, WITH LONG-TERM CURRENT USE OF INSULIN (HCC): ICD-10-CM

## 2022-02-02 DIAGNOSIS — Z79.4 TYPE 2 DIABETES MELLITUS WITHOUT COMPLICATION, WITH LONG-TERM CURRENT USE OF INSULIN (HCC): ICD-10-CM

## 2022-02-02 RX ORDER — BLOOD SUGAR DIAGNOSTIC
1 STRIP MISCELLANEOUS 2 TIMES DAILY
Qty: 100 STRIP | Refills: 5 | Status: SHIPPED | OUTPATIENT
Start: 2022-02-02 | End: 2022-09-06

## 2022-02-14 ENCOUNTER — OFFICE VISIT (OUTPATIENT)
Dept: FAMILY MEDICINE CLINIC | Age: 77
End: 2022-02-14
Payer: MEDICARE

## 2022-02-14 VITALS — HEART RATE: 58 BPM | OXYGEN SATURATION: 97 % | TEMPERATURE: 97.1 F | WEIGHT: 257.8 LBS | BODY MASS INDEX: 36.99 KG/M2

## 2022-02-14 DIAGNOSIS — S42.215A CLOSED NONDISPLACED FRACTURE OF SURGICAL NECK OF LEFT HUMERUS, UNSPECIFIED FRACTURE MORPHOLOGY, INITIAL ENCOUNTER: ICD-10-CM
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
## 2022-02-14 PROCEDURE — G8427 DOCREV CUR MEDS BY ELIG CLIN: HCPCS | Performed by: FAMILY MEDICINE

## 2022-02-14 PROCEDURE — 99213 OFFICE O/P EST LOW 20 MIN: CPT | Performed by: FAMILY MEDICINE

## 2022-02-14 PROCEDURE — G8484 FLU IMMUNIZE NO ADMIN: HCPCS | Performed by: FAMILY MEDICINE

## 2022-02-14 PROCEDURE — G8417 CALC BMI ABV UP PARAM F/U: HCPCS | Performed by: FAMILY MEDICINE

## 2022-02-14 PROCEDURE — 1123F ACP DISCUSS/DSCN MKR DOCD: CPT | Performed by: FAMILY MEDICINE

## 2022-02-14 PROCEDURE — 1036F TOBACCO NON-USER: CPT | Performed by: FAMILY MEDICINE

## 2022-02-14 PROCEDURE — 4040F PNEUMOC VAC/ADMIN/RCVD: CPT | Performed by: FAMILY MEDICINE

## 2022-02-14 RX ORDER — TRAMADOL HYDROCHLORIDE 50 MG/1
50 TABLET ORAL EVERY 6 HOURS PRN
Qty: 30 TABLET | Refills: 0 | Status: SHIPPED | OUTPATIENT
Start: 2022-02-14 | End: 2022-02-22

## 2022-02-14 RX ORDER — SODIUM BICARBONATE 650 MG/1
TABLET ORAL
COMMUNITY
Start: 2022-02-05

## 2022-02-14 NOTE — PROGRESS NOTES
OFFICE NOTE    22  Name: Jonas Mcneill  :1945   Sex:male   Age:76 y.o. SUBJECTIVE  Chief Complaint   Patient presents with    Fall     1hr ago, on driveway    Shoulder Pain     left       HPI came in holding his left arm which hurts up near shoulder  Review of Systems   Slipped on ice. Did not get dizzy. Dog tried to help him up couldn't crawl as had both knees replaced. No radicular pain      Current Outpatient Medications:     traMADol (ULTRAM) 50 MG tablet, Take 1 tablet by mouth every 6 hours as needed for Pain for up to 8 days. Intended supply: 5 days.  Take lowest dose possible to manage pain, Disp: 30 tablet, Rfl: 0    sodium bicarbonate 650 MG tablet, TAKE 1 TABLET BY MOUTH ONCE DAILY, Disp: , Rfl:     blood glucose test strips (ONETOUCH ULTRA) strip, 1 each by Does not apply route 2 times daily, Disp: 100 strip, Rfl: 5    carvedilol (COREG) 25 MG tablet, TAKE 1 TABLET BY MOUTH  TWICE DAILY WITH MEALS, Disp: 180 tablet, Rfl: 1    omeprazole (PRILOSEC) 20 MG delayed release capsule, TAKE 1 CAPSULE BY MOUTH  ONCE DAILY, Disp: 90 capsule, Rfl: 1    isosorbide mononitrate (IMDUR) 60 MG extended release tablet, Take 1/2 tablet orally twice daily, Disp: 90 tablet, Rfl: 1    insulin lispro (HUMALOG) 100 UNIT/ML injection vial, Inject 1 Units into the skin 3 times daily as needed for High Blood Sugar Sliding Scale as needed, Disp: 10 mL, Rfl: 5    ranolazine (RANEXA) 1000 MG extended release tablet, TAKE 1  BY MOUTH TWICE DAILY, Disp: 180 tablet, Rfl: 1    levothyroxine (SYNTHROID) 50 MCG tablet, Take 50 mcg by mouth Daily, Disp: , Rfl:     cloNIDine (CATAPRES) 0.1 MG tablet, Take 0.1 mg by mouth as needed Pt is only taking PRN, Disp: , Rfl:     omega-3 acid ethyl esters (LOVAZA) 1 g capsule, Take 2 g by mouth 2 times daily, Disp: , Rfl:     Magnesium 500 MG TABS, Take 1,000 mg by mouth 2 times daily Pt is taking 2 tablets in the am and 1 tablet in the evening for a total os 1500 mg, Disp: , Rfl:     Psyllium (METAMUCIL FIBER PO), Take 1 capsule by mouth daily, Disp: , Rfl:     loratadine (CLARITIN) 10 MG tablet, Take 10 mg by mouth daily, Disp: , Rfl:     rosuvastatin (CRESTOR) 40 MG tablet, Take 40 mg by mouth every evening, Disp: , Rfl:     sacubitril-valsartan (ENTRESTO) 24-26 MG per tablet, Take 1 tablet by mouth 2 times daily, Disp: 60 tablet, Rfl: 1    ticagrelor (BRILINTA) 90 MG TABS tablet, Take 1 tablet by mouth 2 times daily, Disp: 60 tablet, Rfl: 1    Insulin Degludec (TRESIBA FLEXTOUCH) 200 UNIT/ML SOPN, Inject 55 Units into the skin daily (with breakfast), Disp: , Rfl:     tamsulosin (FLOMAX) 0.4 MG capsule, TAKE 1 CAPSULE BY MOUTH ONCE DAILY AT BEDTIME, Disp: , Rfl:     nitroGLYCERIN (NITROSTAT) 0.4 MG SL tablet, Place 1 tablet under the tongue every 5 minutes as needed for Chest pain up to max of 3 total doses.  If no relief after 1 dose, call 911., Disp: 25 tablet, Rfl: 0    Garlic 925 MG CAPS, Take 500 mg by mouth 2 times daily , Disp: , Rfl:     Cholecalciferol (VITAMIN D3) 50 MCG (2000 UT) CAPS, Take 2,000 Units by mouth daily , Disp: , Rfl:     Ascorbic Acid (VITAMIN C) 1000 MG tablet, Take 1,000 mg by mouth daily, Disp: , Rfl:     zinc sulfate (ZINCATE) 220 (50 Zn) MG capsule, Take 50 mg by mouth daily , Disp: , Rfl:     TURMERIC PO, Take 500 capsules by mouth daily (Patient not taking: Reported on 9/1/2021), Disp: , Rfl:     B Complex Vitamins (B COMPLEX 1 PO), Take 1 tablet by mouth daily , Disp: , Rfl:     aspirin 81 MG tablet, Take 81 mg by mouth daily , Disp: , Rfl:   No Known Allergies    Past Medical History:   Diagnosis Date    CAD (coronary artery disease)     Cancer ([facility] [address]     skin    [facility]     Diverticular disease of colon     DJD (degenerative joint disease), multiple sites     GERD (gastroesophageal reflux disease)     Goiter     H/O thyroid nodule     Hx of malignant neoplasm of prostate     Hyperlipidemia     Hypertension     Hypothyroidism     Status post insertion of drug eluting coronary artery stent 06/18/2021    SVG to OM 1     Past Surgical History:   Procedure Laterality Date    CAROTID ENDARTERECTOMY  05/25/2017    CHOLECYSTECTOMY      COLONOSCOPY      CORONARY ANGIOPLASTY WITH STENT PLACEMENT  06/17/2021    DR. FINE [facility], STENT TO RIGHT PDA 2/12 LISBETH    CORONARY ARTERY BYPASS GRAFT  10/11/2001    HERNIA REPAIR      SKIN CANCER EXCISION      MELONOMA    THYROID SURGERY  09/01/2017    BX    TOTAL KNEE ARTHROPLASTY Left 12/17/2018     Family History   Problem Relation Age of Onset    Diabetes Mother     Cancer Mother     Heart Attack Father     Coronary Art Dis Father      Social History     Tobacco History     Smoking Status  Former Smoker Quit date  1/17/1984 Smoking Frequency  1 pack/day for 5 years (5 pk yrs)    Smokeless Tobacco Use  Never Used          Alcohol History     Alcohol Use Status  Never          Drug Use     Drug Use Status  Never          Sexual Activity     Sexually Active  Not Currently                OBJECTIVE  Vitals:    02/14/22 1539   Pulse: 58   Temp: 97.1 °F (36.2 °C)   TempSrc: Temporal   SpO2: 97%   Weight: 257 lb 12.8 oz (116.9 kg)        Body mass index is 36.99 kg/m². Orders Placed This Encounter   Procedures    XR SHOULDER LEFT (MIN 2 VIEWS)     Standing Status:   Future     Number of Occurrences:   1     Standing Expiration Date:   2/14/2023        EXAM   Physical Exam  Vitals and nursing note reviewed. Constitutional:       Appearance: Normal appearance. He is obese. Cardiovascular:      Rate and Rhythm: Regular rhythm. Bradycardia present. Heart sounds: No murmur heard. Pulmonary:      Effort: Pulmonary effort is normal.      Breath sounds: Normal breath sounds. Musculoskeletal:      Comments: STS left shoulder no ecchymoses. Tender over proximal humerus. No visible deformity.  NV exam left hand normal   Neurological:      Mental Status: He is alert.           Neto Barry was seen today for fall and shoulder pain. Diagnoses and all orders for this visit:    Fall at home, initial encounter  -     XR SHOULDER LEFT (MIN 2 VIEWS); Future  -     traMADol (ULTRAM) 50 MG tablet; Take 1 tablet by mouth every 6 hours as needed for Pain for up to 8 days. Intended supply: 5 days. Take lowest dose possible to manage pain    X-ray shows compacted fracture surgical neck left humerus. Put in sling. Given Tramadol [facility] walk in tomorrow AM.       Return going to [employer].     Electronically signed by Dylan Calderon MD on 2/14/22 at 4:16 PM EST

## 2022-03-25 LAB — DIABETIC RETINOPATHY: NEGATIVE

## 2022-05-02 RX ORDER — ROSUVASTATIN CALCIUM 40 MG/1
40 TABLET, COATED ORAL EVERY EVENING
Qty: 90 TABLET | Refills: 1 | Status: SHIPPED
Start: 2022-05-02 | End: 2022-11-01 | Stop reason: SDUPTHER

## 2022-05-02 NOTE — TELEPHONE ENCOUNTER
Patients last appointment 9/1/2021. Patients next scheduled appointment No future appointments. Informed patient needs seen soon and needs blood work. Per patient since he gets blood work done with Dr. Hernandez Jimenez and he just had it done in March he would like to see Kerri Sanyd first then Rom can determine what blood work he needs now. Call transferred to [facility] to schedule.

## 2022-05-03 ENCOUNTER — OFFICE VISIT (OUTPATIENT)
Dept: FAMILY MEDICINE CLINIC | Age: 77
End: 2022-05-03
Payer: MEDICARE

## 2022-05-03 VITALS
TEMPERATURE: 98 F | HEIGHT: 70 IN | BODY MASS INDEX: 36.31 KG/M2 | OXYGEN SATURATION: 96 % | HEART RATE: 58 BPM | WEIGHT: 253.6 LBS | DIASTOLIC BLOOD PRESSURE: 70 MMHG | RESPIRATION RATE: 17 BRPM | SYSTOLIC BLOOD PRESSURE: 132 MMHG

## 2022-05-03 DIAGNOSIS — N18.31 TYPE 2 DIABETES MELLITUS WITH STAGE 3A CHRONIC KIDNEY DISEASE, WITHOUT LONG-TERM CURRENT USE OF INSULIN (HCC): ICD-10-CM

## 2022-05-03 DIAGNOSIS — E11.9 TYPE 2 DIABETES MELLITUS WITHOUT COMPLICATION, WITH LONG-TERM CURRENT USE OF INSULIN (HCC): ICD-10-CM

## 2022-05-03 DIAGNOSIS — I65.21 STENOSIS OF RIGHT CAROTID ARTERY: ICD-10-CM

## 2022-05-03 DIAGNOSIS — E03.9 HYPOTHYROIDISM, UNSPECIFIED TYPE: ICD-10-CM

## 2022-05-03 DIAGNOSIS — R06.02 SOB (SHORTNESS OF BREATH): ICD-10-CM

## 2022-05-03 DIAGNOSIS — E11.22 TYPE 2 DIABETES MELLITUS WITH STAGE 3A CHRONIC KIDNEY DISEASE, WITHOUT LONG-TERM CURRENT USE OF INSULIN (HCC): ICD-10-CM

## 2022-05-03 DIAGNOSIS — E78.01 FAMILIAL HYPERCHOLESTEROLEMIA: ICD-10-CM

## 2022-05-03 DIAGNOSIS — K57.30 DIVERTICULAR DISEASE OF COLON: ICD-10-CM

## 2022-05-03 DIAGNOSIS — E21.3 HYPERPARATHYROIDISM (HCC): ICD-10-CM

## 2022-05-03 DIAGNOSIS — Z85.46 HX OF MALIGNANT NEOPLASM OF PROSTATE: ICD-10-CM

## 2022-05-03 DIAGNOSIS — K21.9 GASTROESOPHAGEAL REFLUX DISEASE WITHOUT ESOPHAGITIS: ICD-10-CM

## 2022-05-03 DIAGNOSIS — R09.89 PULMONARY CONGESTION: ICD-10-CM

## 2022-05-03 DIAGNOSIS — Z98.890 HISTORY OF KNEE SURGERY: ICD-10-CM

## 2022-05-03 DIAGNOSIS — E04.9 GOITER: ICD-10-CM

## 2022-05-03 DIAGNOSIS — Z00.00 ROUTINE GENERAL MEDICAL EXAMINATION AT A HEALTH CARE FACILITY: ICD-10-CM

## 2022-05-03 DIAGNOSIS — Z79.4 TYPE 2 DIABETES MELLITUS WITHOUT COMPLICATION, WITH LONG-TERM CURRENT USE OF INSULIN (HCC): ICD-10-CM

## 2022-05-03 DIAGNOSIS — S31.109A WOUND OF LEFT GROIN, INITIAL ENCOUNTER: ICD-10-CM

## 2022-05-03 DIAGNOSIS — G47.33 OSA (OBSTRUCTIVE SLEEP APNEA): Primary | ICD-10-CM

## 2022-05-03 DIAGNOSIS — I25.10 CORONARY ARTERY DISEASE INVOLVING NATIVE CORONARY ARTERY OF NATIVE HEART WITHOUT ANGINA PECTORIS: ICD-10-CM

## 2022-05-03 DIAGNOSIS — E66.01 SEVERE OBESITY (BMI 35.0-39.9) WITH COMORBIDITY (HCC): ICD-10-CM

## 2022-05-03 DIAGNOSIS — N18.31 STAGE 3A CHRONIC KIDNEY DISEASE (HCC): ICD-10-CM

## 2022-05-03 DIAGNOSIS — M15.9 PRIMARY OSTEOARTHRITIS INVOLVING MULTIPLE JOINTS: ICD-10-CM

## 2022-05-03 DIAGNOSIS — I10 ESSENTIAL HYPERTENSION: ICD-10-CM

## 2022-05-03 PROBLEM — N18.30 CHRONIC RENAL DISEASE, STAGE III (HCC): Status: ACTIVE | Noted: 2022-05-03

## 2022-05-03 PROCEDURE — 99214 OFFICE O/P EST MOD 30 MIN: CPT | Performed by: NURSE PRACTITIONER

## 2022-05-03 PROCEDURE — 1036F TOBACCO NON-USER: CPT | Performed by: NURSE PRACTITIONER

## 2022-05-03 PROCEDURE — 3044F HG A1C LEVEL LT 7.0%: CPT | Performed by: NURSE PRACTITIONER

## 2022-05-03 PROCEDURE — G8417 CALC BMI ABV UP PARAM F/U: HCPCS | Performed by: NURSE PRACTITIONER

## 2022-05-03 PROCEDURE — G8427 DOCREV CUR MEDS BY ELIG CLIN: HCPCS | Performed by: NURSE PRACTITIONER

## 2022-05-03 PROCEDURE — 1123F ACP DISCUSS/DSCN MKR DOCD: CPT | Performed by: NURSE PRACTITIONER

## 2022-05-03 PROCEDURE — 4040F PNEUMOC VAC/ADMIN/RCVD: CPT | Performed by: NURSE PRACTITIONER

## 2022-05-03 ASSESSMENT — ENCOUNTER SYMPTOMS
VOMITING: 0
DIARRHEA: 0
ABDOMINAL PAIN: 0
CHEST TIGHTNESS: 0
TROUBLE SWALLOWING: 0
VOICE CHANGE: 0
NAUSEA: 0
WHEEZING: 0
BACK PAIN: 0
CONSTIPATION: 0
SHORTNESS OF BREATH: 0
BLOOD IN STOOL: 0
COUGH: 0

## 2022-05-03 NOTE — PATIENT INSTRUCTIONS
Patient Education        Body Mass Index: Care Instructions  Your Care Instructions     Body mass index (BMI) can help you see if your weight is raising your risk for health problems. It uses a formula to compare how much you weigh with how tallyou are.  A BMI lower than 18.5 is considered underweight.  A BMI between 18.5 and 24.9 is considered healthy.  A BMI between 25 and 29.9 is considered overweight. A BMI of 30 or higher is considered obese. If your BMI is in the normal range, it means that you have a lower risk for weight-related health problems. If your BMI is in the overweight or obese range, you may be at increased risk for weight-related health problems, such as high blood pressure, heart disease, stroke, arthritis or joint pain, and diabetes. If your BMI is in the underweight range, you may be at increased risk for health problems such as fatigue, lower protection (immunity) againstillness, muscle loss, bone loss, hair loss, and hormone problems. BMI is just one measure of your risk for weight-related health problems. You may be at higher risk for health problems if you are not active, you eat anunhealthy diet, or you drink too much alcohol or use tobacco products. Follow-up care is a key part of your treatment and safety. Be sure to make and go to all appointments, and call your doctor if you are having problems. It's also a good idea to know your test results and keep alist of the medicines you take. How can you care for yourself at home?  Practice healthy eating habits. This includes eating plenty of fruits, vegetables, whole grains, lean protein, and low-fat dairy.  If your doctor recommends it, get more exercise. Walking is a good choice. Bit by bit, increase the amount you walk every day. Try for at least 30 minutes on most days of the week.  Do not smoke. Smoking can increase your risk for health problems.  If you need help quitting, talk to your doctor about stop-smoking programs and medicines. These can increase your chances of quitting for good.  Limit alcohol to 2 drinks a day for men and 1 drink a day for women. Too much alcohol can cause health problems. If you have a BMI higher than 25   Your doctor may do other tests to check your risk for weight-related health problems. This may include measuring the distance around your waist. A waist measurement of more than 40 inches in men or 35 inches in women can increase the risk of weight-related health problems.  Talk with your doctor about steps you can take to stay healthy or improve your health. You may need to make lifestyle changes to lose weight and stay healthy, such as changing your diet and getting regular exercise. If you have a BMI lower than 18.5   Your doctor may do other tests to check your risk for health problems.  Talk with your doctor about steps you can take to stay healthy or improve your health. You may need to make lifestyle changes to gain or maintain weight and stay healthy, such as getting more healthy foods in your diet and doing exercises to build muscle. Where can you learn more? Go to https://[employer].Derivix. org and sign in to your [employer] account. Enter S176 in the [facility] box to learn more about \"Body Mass Index: Care Instructions. \"     If you do not have an account, please click on the \"Sign Up Now\" link. Current as of: December 27, 2021               Content Version: 13.2  © 2006-2022 Healthwise, Incorporated. Care instructions adapted under license by [facility] ([facility]). If you have questions about a medical condition or this instruction, always ask your healthcare professional. [place] [address] any warranty or liability for your use of this information. Patient Education        Learning About Coronary Artery Disease (CAD)  What is coronary artery disease?      Coronary artery disease is a condition that occurs when plaque builds up in the arteries that bring oxygen-rich blood to your heart. Plaque is a fatty substance made of cholesterol, calcium, and other substances in the blood. Thisprocess is called hardening of the arteries, or atherosclerosis. What happens when you have coronary artery disease?  Plaque may narrow the coronary arteries. Narrowed arteries cause poor blood flow. This can lead to angina symptoms such as chest pain or discomfort. If blood flow is completely blocked, you could have a heart attack.  You can slow and reduce the risk of future problems by making changes in your lifestyle. These include quitting smoking and eating heart-healthy foods.  Treatment, along with changes in your lifestyle, can help you live a longer and healthier life. How can you prevent coronary artery disease?  Do not smoke. It may be the best thing you can do to prevent coronary artery disease. If you need help quitting, talk to your doctor about stop-smoking programs and medicines. These can increase your chances of quitting for good.  Be active. Try to do moderate activity at least 2½ hours a week. Or try to do vigorous activity at least 1¼ hours a week. You may want to walk or try other activities, such as running, swimming, cycling, or playing tennis or team sports.  Eat heart-healthy foods. Eat more fruits and vegetables and less food that contains saturated and trans fats. Limit alcohol, sodium, and sweets.  Stay at a healthy weight. Lose weight if you need to.  Manage other health problems such as diabetes, high blood pressure, and high cholesterol. How is coronary artery disease treated?  Your doctor will suggest that you make lifestyle changes. For example, your doctor may ask you to eat healthy foods, quit smoking, lose extra weight, and be more active.  You will take medicines that help prevent a heart attack.  Your doctor may suggest a procedure to open narrowed or blocked arteries. This is called angioplasty.  Or your doctor may suggest using healthy blood vessels to create detours around narrowed or blocked arteries. This is called bypass surgery. Follow-up care is a key part of your treatment and safety. Be sure to make and go to all appointments, and call your doctor if you are having problems. It's also a good idea to know your test results and keep alist of the medicines you take. Where can you learn more? Go to https://[employer].Buscatucancha.com. org and sign in to your [employer] account. Enter [phone] [id number] in the [employer] box to learn more about \"Learning About Coronary Artery Disease (CAD). \"     If you do not have an account, please click on the \"Sign Up Now\" link. Current as of: January 10, 2022               Content Version: 13.2  © 6445-6800 Healthwise, Incorporated. Care instructions adapted under license by [facility] ([facility]). If you have questions about a medical condition or this instruction, always ask your healthcare professional. [place] [address] any warranty or liability for your use of this information.

## 2022-05-03 NOTE — PROGRESS NOTES
Nadja Escoto : 1945 Sex: male  Age: 65 y.o. Chief Complaint   Patient presents with    Diabetes     check up, , pateint requesting an updated sleep study    Shortness of Breath     occasionally    Groin Pain     \"there's a hole\"       Assessment and Plan:    Jennifer Dumont was seen today for diabetes, shortness of breath and groin pain. Diagnoses and all orders for this visit:    JOEL (obstructive sleep apnea)  -     Sleep Study with PAP Titration; Future    Type 2 diabetes mellitus without complication, with long-term current use of insulin (HCC)  -     Hemoglobin A1C; Future  -     Microalbumin, Ur; Future  -     Urinalysis; Future    Essential hypertension  -     XR CHEST (2 VW); Future  -     CBC with Auto Differential; Future  -     Comprehensive Metabolic Panel, Fasting; Future    Coronary artery disease involving native coronary artery of native heart without angina pectoris    Stenosis of right carotid artery    Gastroesophageal reflux disease without esophagitis    Primary osteoarthritis involving multiple joints    Diverticular disease of colon    Hx of malignant neoplasm of prostate    Goiter    History of knee surgery    Hyperparathyroidism ([facility] [address]  -     TSH; Future    Routine general medical examination at a health care facility    Type 2 diabetes mellitus with stage 3a chronic kidney disease, without long-term current use of insulin (HCC)    Severe obesity (BMI 35.0-39. 9) with comorbidity ([address]    Stage 3a chronic kidney disease ([address]    Wound of left groin, initial encounter  Comments:  states it does not bother him it gets red from time to time and then gets better     SOB (shortness of breath)  -     XR CHEST (2 VW); Future    Pulmonary congestion  -     XR CHEST (2 VW); Future    Familial hypercholesterolemia  -     Lipid Panel; Future    Hypothyroidism, unspecified type   -     TSH;  Future        USPTF:      Lab Results   Component Value Date    LABA1C 6.2 (H) 2022 No results found for: EAG   Abnormal Blood Glucose and Type 2 Diabetes Mellitus: Screening -- Adults aged 36 to 79 years who are overweight or obese Grade: B (Recommended)    BP Readings from Last 3 Encounters:   05/03/22 132/70   09/01/21 124/68   06/30/21 138/78     High Blood Pressure: Screening and Home Monitoring -- Adults  Grade: A (Recommended) recommends screening for high blood pressure in ages 25 years or older. obtain measurements outside of the clinical setting for diagnostic confirmation before starting treatment. Annual screening for adults aged 36 years or older or those who are at increased risk for blood pressure    (  ) Colorectal Cancer: Screening --Adults aged 48 to 76 years  Grade: A (Recommended) recommends screening for colorectal cancer starting at age 48 years and continuing until age 76 years. Lab Results   Component Value Date    CHOL 166 06/18/2021    CHOL 177 06/14/2021    CHOL 179 02/08/2021     Lab Results   Component Value Date    TRIG 254 (H) 06/18/2021    TRIG 222 (H) 06/14/2021    TRIG 295 (H) 02/08/2021     Lab Results   Component Value Date    HDL 27 06/18/2021    HDL 32 06/14/2021    HDL 38 02/08/2021     Lab Results   Component Value Date    LDLCALC 88 06/18/2021    LDLCALC 101 (H) 06/14/2021    LDLCALC 82 02/08/2021     Lab Results   Component Value Date    LABVLDL 51 06/18/2021    LABVLDL 44 06/14/2021    LABVLDL 59 02/08/2021     No results found for: CHOLHDLRATIO   (  )  Lipid Disorders in Adults: Screening -- Men 28 and Older  Grade: A (Recommended) recommends screening men aged 28 and older for lipid disorders.         Alcohol Use:     Frequency of Alcohol Consumption: Not on file    Average Number of Drinks: Not on file    Frequency of Binge Drinking: Not on file      (Non Drinker) Alcohol Misuse: Screening and Behavioral Counseling Interventions in Primary Care -- Adults  Grade: B (Recommended) recommends that clinicians screen adults aged 25 years or older for alcohol misuse and provide persons engaged in risky or hazardous drinking with brief behavioral counseling interventions to reduce alcohol misuse. Estimated body mass index is 36.39 kg/m² as calculated from the following:    Height as of this encounter: 5' 10\" (1.778 m). Weight as of this encounter: 253 lb 9.6 oz (115 kg). (  )  Obesity: Screening for and Management of-- All Adults  Grade: B(Recommended) recommends screening all adults for obesity. Clinicians should offer or refer patients with a body mass index (BMI) of 30 kg/m2 or higher to intensive, multicomponent behavioral interventions. (Not a fall risk)  Fall Prevention -- Exercise/Physical Therapy: Community-dwelling Adults 72 Years or Older, Increased Risk for Falls   Grade: B (Recommended) recommends exercise or physical therapy to prevent falls in community-dwelling adults aged 72 years or older who are at increased risk for falls. (No new symptoms noted or reported today)  Depression: Screening -- General adult population, including pregnant and postpartum women  Grade: B(Recommended) recommends screening for depression in the general adult population,  Screening should be implemented with adequate systems in place to ensure accurate diagnosis, effective treatment, and appropriate follow-up. (  ) Glaucoma: Screening - Adults and Diabetic Eye Exam      (TSH 3.66) Thyroid Dysfunction: Screening --      Lab Results   Component Value Date    PSADIA 3.85 11/12/2021      (  ) Prostate Cancer: Prostate-Specific Antigen (PSA)-Based Screening -- All Men  PSA 2.5 Mar 2020  PSA 3.1 Sep 2019  PSA 2.7 Feb 2018      Educational materials printed for patient's review and were included in patient instructions on his After Visit Summary and given to patient at the end of visit. Counseled regarding above diagnosis, including possible risks and complications,  especially if left uncontrolled.      Counseled regarding the possible side effects, risks, benefits and alternatives to treatment; patient and/or guardian verbalizes understanding, agrees, feels comfortable with and wishes to proceed with above treatment plan. Advised patient to call with any new medication issues, and read all Rx info from pharmacy to assure aware of all possible risks and side effects of medication before taking. Reviewed age and gender appropriate health screening exams and vaccinations. Advised patient regarding importance of keeping up with recommended health maintenance and to schedule as soon as possible if overdue, as this is important in assessing for undiagnosed pathology, especially cancer, as well as protecting against potentially harmful/life threatening disease. Patient verbalizes understanding and agrees with above counseling, assessment and plan. All questions answered. On 05/03/22 I have spent 30 reviewing previous notes, test results and face to face with the patient discussing the diagnosis and importance of compliance with the treatment plan as well as documenting on the day of the visit. Educational materials exercises printed for patient's review and were included in patient instructions on their After Visit Summary and given to patient at the end of visit.      Return in about 6 months (around 11/3/2022) for Routine Visit with Labs. Tristan aCrdona  presents today for evaluation and management of chronic medical problems. Current medication list reviewed. The patient is tolerating all medications well without adverse events or known side effects. The patient does understand the risk and benefits of the prescribed medications. The patient is not up-to-date on all age-appropriate wellness issues. Patient denies any reccent hospitalizations or ER visit. CPAP    Patient is doing well on CPAP, utilizing it on a regular basis for a 7 year history.   He reports resting well with the machine and is getting much better sleep on it, but lately he is having lack of sleep complaints at this time. With sleepiness during the day and has had some return of snoring  . Recommend he continue with pursuit of getting a CPAP study      Acute Complaints reported:  Celeste Boyer is reporting some shortness of breath which seems to be more congestion in description as opposed to exertional and is kind of positional if anything. He denies any chest pain or clamminess associated with it    He is also having an issue with any region in his right groin that does not seem to be healing and seems to be a nonunion after a surgical incision which she cannot remember what he had done looks like an inguinal hernia but he says he has not had 1    He is also having some more episodes where he is waking up at night he thinks that his CPAP is not working correctly and would like another sleep study        CHRONIC CONDITION:    DM: Mild intensity but controlled on    blood glucose test strips (ONETOUCH ULTRA) strip, 1 each by Does not apply route 2 times daily, Disp: 100 strip, Rfl: 5   insulin lispro (HUMALOG) 100 UNIT/ML injection vial, Inject 1 Units into the skin 3 times daily as needed for High Blood Sugar Sliding Scale as needed, Disp: 10 mL, Rfl: 5    Insulin Degludec (TRESIBA FLEXTOUCH) 200 UNIT/ML SOPN, Inject 55 Units into the skin daily (with breakfast), Disp: , Rfl:    remains without symptoms, no weight loss, no increase in thirst, or urination and no low blood sugar incidents. No reports of poor circulation or issues with feet ulceration or injury. Is better when compliant with diet and medications. Vision Exam Up to Date:                             Y      2022  Currently on an ACE :                                 Y  Most Recent Hgb A1c <7:                           Y      AIC 6.2  Microalbumin Test Reviewed:                     Y   Foot Exam Up To Date:                               Y 2022  Flu Shot Up To Date:                                   Mary Whitmore 2021  Has patient been seen by Neurology           N   Currently on a Statin                                    Y       HTN: Stable hypertension, controlled on   carvedilol (COREG) 25 MG tablet, TAKE 1 TABLET BY MOUTH  TWICE DAILY WITH MEALS, Disp: 180 tablet, Rfl: 1   isosorbide mononitrate (IMDUR) 60 MG extended release tablet, Take 1/2 tablet orally twice daily, Disp: 90 tablet, Rfl: 1   ranolazine (RANEXA) 1000 MG extended release tablet, TAKE 1  BY MOUTH TWICE DAILY, Disp: 180 tablet, Rfl: 1   cloNIDine (CATAPRES) 0.1 MG tablet, Take 0.1 mg by mouth as needed Pt is only taking PRN, Disp: , Rfl:    Magnesium 500 MG TABS, Take 1,000 mg by mouth 2 times daily Pt is taking 2 tablets in the am and 1 tablet in the evening for a total os 1500 mg, Disp: , Rfl:   sacubitril-valsartan (ENTRESTO) 24-26 MG per tablet, Take 1 tablet by mouth 2 times daily, Disp: 60 tablet, Rfl: 1   ticagrelor (BRILINTA) 90 MG TABS tablet, Take 1 tablet by mouth 2 times daily, Disp: 60 tablet, Rfl: 1   nitroGLYCERIN (NITROSTAT) 0.4 MG SL tablet, Place 1 tablet under the tongue every 5 minutes as needed for Chest pain up to max of 3 total doses. If no relief after 1 dose, call 911., Disp: 25 tablet, Rfl: 0   remains at a mild intensity but overall good control, without symptoms, no ringing in the ears, no headaches and no nose bleeds. Better on medications. CAD: Patient currently on a Betablocker and an ACE for LVSD. Tobacco Use: N    Flu Shot Up To Date: Y     Meds Listed in Medical Record: Y     Hyperlipidemia: Mild in intensity but controlled on   rosuvastatin (CRESTOR) 40 MG tablet, Take 1 tablet by mouth every evening, Disp: 90 tablet, Rfl: 1   omega-3 acid ethyl esters (LOVAZA) 1 g capsule, Take 2 g by mouth 2 times daily, Disp: , Rfl:   , without symptoms, no complications with dietary treatment regimen reporting no side effects or intolereances. Compliant with treatment and diet.  No muscle aches, new joint pains or abd pain. Hypothyroid: Remains controlled, without symptoms, on   levothyroxine (SYNTHROID, LEVOTHROID) 50 MCG tablet Take 1 tablet by mouth 1 (one) time each day   with no evidence of weight gain, palpitations, fatigue, difficulty swallowing, neck prominence or hair loss. Is better on medications and worsens with forgetting to take medications. Has been on medications religiously since last visit. Review of Systems   Constitutional: Negative for activity change, chills, diaphoresis, fatigue, fever and unexpected weight change. HENT: Negative for trouble swallowing and voice change. Eyes: Negative for visual disturbance. Respiratory: Negative for cough, chest tightness, shortness of breath and wheezing. Cardiovascular: Negative for chest pain, palpitations and leg swelling. Gastrointestinal: Negative for abdominal pain, blood in stool, constipation, diarrhea, nausea and vomiting. Endocrine: Negative for polydipsia, polyphagia and polyuria. Genitourinary: Negative for dysuria, enuresis, frequency and hematuria. Musculoskeletal: Negative for arthralgias, back pain, gait problem, joint swelling, myalgias and neck stiffness. Skin: Negative for rash. Neurological: Negative for dizziness, seizures, syncope, facial asymmetry, weakness, light-headedness, numbness and headaches. Hematological: Does not bruise/bleed easily. Psychiatric/Behavioral: Negative for behavioral problems, confusion, hallucinations and suicidal ideas. The patient is not nervous/anxious.           Current Outpatient Medications:     rosuvastatin (CRESTOR) 40 MG tablet, Take 1 tablet by mouth every evening, Disp: 90 tablet, Rfl: 1    sodium bicarbonate 650 MG tablet, TAKE 1 TABLET BY MOUTH ONCE DAILY, Disp: , Rfl:     blood glucose test strips (ONETOUCH ULTRA) strip, 1 each by Does not apply route 2 times daily, Disp: 100 strip, Rfl: 5    carvedilol (COREG) 25 MG tablet, TAKE 1 TABLET BY MOUTH  TWICE DAILY WITH MEALS, Disp: 180 tablet, Rfl: 1    omeprazole (PRILOSEC) 20 MG delayed release capsule, TAKE 1 CAPSULE BY MOUTH  ONCE DAILY, Disp: 90 capsule, Rfl: 1    isosorbide mononitrate (IMDUR) 60 MG extended release tablet, Take 1/2 tablet orally twice daily, Disp: 90 tablet, Rfl: 1    insulin lispro (HUMALOG) 100 UNIT/ML injection vial, Inject 1 Units into the skin 3 times daily as needed for High Blood Sugar Sliding Scale as needed, Disp: 10 mL, Rfl: 5    ranolazine (RANEXA) 1000 MG extended release tablet, TAKE 1  BY MOUTH TWICE DAILY, Disp: 180 tablet, Rfl: 1    levothyroxine (SYNTHROID) 50 MCG tablet, Take 50 mcg by mouth Daily, Disp: , Rfl:     cloNIDine (CATAPRES) 0.1 MG tablet, Take 0.1 mg by mouth as needed Pt is only taking PRN, Disp: , Rfl:     omega-3 acid ethyl esters (LOVAZA) 1 g capsule, Take 2 g by mouth 2 times daily, Disp: , Rfl:     Magnesium 500 MG TABS, Take 1,000 mg by mouth 2 times daily Pt is taking 2 tablets in the am and 1 tablet in the evening for a total os 1500 mg, Disp: , Rfl:     Psyllium (METAMUCIL FIBER PO), Take 1 capsule by mouth daily, Disp: , Rfl:     loratadine (CLARITIN) 10 MG tablet, Take 10 mg by mouth daily, Disp: , Rfl:     sacubitril-valsartan (ENTRESTO) 24-26 MG per tablet, Take 1 tablet by mouth 2 times daily, Disp: 60 tablet, Rfl: 1    ticagrelor (BRILINTA) 90 MG TABS tablet, Take 1 tablet by mouth 2 times daily, Disp: 60 tablet, Rfl: 1    Insulin Degludec (TRESIBA FLEXTOUCH) 200 UNIT/ML SOPN, Inject 55 Units into the skin daily (with breakfast), Disp: , Rfl:     tamsulosin (FLOMAX) 0.4 MG capsule, TAKE 1 CAPSULE BY MOUTH ONCE DAILY AT BEDTIME, Disp: , Rfl:     nitroGLYCERIN (NITROSTAT) 0.4 MG SL tablet, Place 1 tablet under the tongue every 5 minutes as needed for Chest pain up to max of 3 total doses.  If no relief after 1 dose, call 911., Disp: 25 tablet, Rfl: 0    Garlic 018 MG CAPS, Take 500 mg by mouth 2 times daily , Disp: , Rfl:     Cholecalciferol (VITAMIN D3) 50 MCG (2000 UT) CAPS, Take 2,000 Units by mouth daily , Disp: , Rfl:     Ascorbic Acid (VITAMIN C) 1000 MG tablet, Take 1,000 mg by mouth daily, Disp: , Rfl:     zinc sulfate (ZINCATE) 220 (50 Zn) MG capsule, Take 50 mg by mouth daily , Disp: , Rfl:     TURMERIC PO, Take 500 capsules by mouth daily , Disp: , Rfl:     B Complex Vitamins (B COMPLEX 1 PO), Take 1 tablet by mouth daily , Disp: , Rfl:     aspirin 81 MG tablet, Take 81 mg by mouth daily , Disp: , Rfl:   No Known Allergies    Past Medical History:   Diagnosis Date    CAD (coronary artery disease)     Cancer ([facility] [address]     skin    Diabetes mellitus ([facility] [address]     Diverticular disease of colon     DJD (degenerative joint disease), multiple sites     GERD (gastroesophageal reflux disease)     Goiter     H/O thyroid nodule     Hx of malignant neoplasm of prostate     Hyperlipidemia     Hypertension     Hypothyroidism     Status post insertion of drug eluting coronary artery stent 06/18/2021    SVG to OM 1     Past Surgical History:   Procedure Laterality Date    CAROTID ENDARTERECTOMY  05/25/2017    CHOLECYSTECTOMY      COLONOSCOPY      CORONARY ANGIOPLASTY WITH STENT PLACEMENT  06/17/2021    DR. FINE [facility], STENT TO RIGHT PDA 2/12 LISBETH    CORONARY ARTERY BYPASS GRAFT  10/11/2001    HERNIA REPAIR      SKIN CANCER EXCISION      MELONOMA    THYROID SURGERY  09/01/2017    BX    TOTAL KNEE ARTHROPLASTY Left 12/17/2018     Family History   Problem Relation Age of Onset    Diabetes Mother     Cancer Mother     Heart Attack Father     Coronary Art Dis Father      Social History     Socioeconomic History    Marital status:      Spouse name: Not on file    Number of children: Not on file    Years of education: Not on file    Highest education level: Not on file   Occupational History    Not on file   Tobacco Use    Smoking status: Former Smoker     Packs/day: 1.00     Years: 5.00     Pack years: 5.00     Quit date: 1984     Years since quittin.3    Smokeless tobacco: Never Used   Vaping Use    Vaping Use: Never used   Substance and Sexual Activity    Alcohol use: Never    Drug use: Never    Sexual activity: Not Currently   Other Topics Concern    Not on file   Social History Narrative    Not on file     Social Determinants of Health     Financial Resource Strain: Low Risk     Difficulty of Paying Living Expenses: Not hard at all   Food Insecurity: No Food Insecurity    Worried About [address] in the Last Year: Never true    [address] in the Last Year: Never true   Transportation Needs:     Lack of Transportation (Medical): Not on file    Lack of Transportation (Non-Medical): Not on file   Physical Activity:     Days of Exercise per Week: Not on file    Minutes of Exercise per Session: Not on file   Stress:     Feeling of Stress : Not on file   Social Connections:     Frequency of Communication with Friends and Family: Not on file    Frequency of Social Gatherings with Friends and Family: Not on file    Attends Jew Services: Not on file    Active Member of Clubs or Organizations: Not on file    Attends Club or Organization Meetings: Not on file    Marital Status: Not on file   Intimate Partner Violence:     Fear of Current or Ex-Partner: Not on file    Emotionally Abused: Not on file    Physically Abused: Not on file    Sexually Abused: Not on file   Housing Stability:     Unable to Pay for Housing in the Last Year: Not on file    Number of [place] in the Last Year: Not on file    Unstable Housing in the Last Year: Not on file       Vitals:    22 0939   BP: 132/70   Site: Left Upper Arm   Position: Sitting   Cuff Size: Large Adult   Pulse: 58   Resp: 17   Temp: 98 °F (36.7 °C)   TempSrc: Temporal   SpO2: 96%   Weight: 253 lb 9.6 oz (115 kg)   Height: 5' 10\" (1.778 m)       Physical Exam  Vitals and nursing note reviewed.    Constitutional:       Appearance: Normal appearance. HENT:      Head: Normocephalic. Right Ear: Tympanic membrane and ear canal normal. There is no impacted cerumen. Left Ear: Tympanic membrane and ear canal normal. There is no impacted cerumen. Nose: Nose normal.      Mouth/Throat:      Mouth: Mucous membranes are dry. Eyes:      Extraocular Movements: Extraocular movements intact. Pupils: Pupils are equal, round, and reactive to light. Neck:      Vascular: No carotid bruit. Cardiovascular:      Rate and Rhythm: Normal rate and regular rhythm. Pulses: Normal pulses. Heart sounds: Normal heart sounds. No murmur heard. No friction rub. No gallop. Pulmonary:      Effort: Pulmonary effort is normal. No respiratory distress. Breath sounds: Normal breath sounds. No stridor. No wheezing, rhonchi or rales. Chest:      Chest wall: No tenderness. Abdominal:      General: Bowel sounds are normal. There is no distension. Palpations: Abdomen is soft. Musculoskeletal:         General: No swelling, tenderness, deformity or signs of injury. Cervical back: No rigidity. No muscular tenderness. Right lower leg: No edema. Left lower leg: No edema. Lymphadenopathy:      Cervical: No cervical adenopathy. Skin:     General: Skin is warm and dry. Capillary Refill: Capillary refill takes 2 to 3 seconds. Findings: No bruising, lesion or rash. Comments: Wound   Neurological:      General: No focal deficit present. Mental Status: He is alert and oriented to person, place, and time. Motor: No weakness. Gait: Gait normal.   Psychiatric:         Attention and Perception: Attention normal.         Mood and Affect: Mood normal.         Behavior: Behavior normal.         Thought Content: Thought content does not include homicidal or suicidal ideation. Thought content does not include homicidal or suicidal plan.                     Seen By:  VERENICE Allison - CNP

## 2022-05-06 RX ORDER — LEVOTHYROXINE SODIUM 0.05 MG/1
50 TABLET ORAL DAILY
Qty: 90 TABLET | Refills: 1 | Status: SHIPPED | OUTPATIENT
Start: 2022-05-06 | End: 2022-09-06

## 2022-06-17 RX ORDER — RANOLAZINE 1000 MG/1
TABLET, EXTENDED RELEASE ORAL
Qty: 180 TABLET | Refills: 1 | Status: SHIPPED | OUTPATIENT
Start: 2022-06-17

## 2022-06-30 ENCOUNTER — TELEPHONE (OUTPATIENT)
Dept: FAMILY MEDICINE CLINIC | Age: 77
End: 2022-06-30

## 2022-06-30 ENCOUNTER — OFFICE VISIT (OUTPATIENT)
Dept: FAMILY MEDICINE CLINIC | Age: 77
End: 2022-06-30
Payer: MEDICARE

## 2022-06-30 VITALS
RESPIRATION RATE: 18 BRPM | HEART RATE: 83 BPM | DIASTOLIC BLOOD PRESSURE: 60 MMHG | OXYGEN SATURATION: 97 % | WEIGHT: 253 LBS | BODY MASS INDEX: 36.22 KG/M2 | SYSTOLIC BLOOD PRESSURE: 100 MMHG | HEIGHT: 70 IN | TEMPERATURE: 97.4 F

## 2022-06-30 DIAGNOSIS — N18.2 TYPE 2 DIABETES MELLITUS WITH STAGE 2 CHRONIC KIDNEY DISEASE, WITH LONG-TERM CURRENT USE OF INSULIN (HCC): ICD-10-CM

## 2022-06-30 DIAGNOSIS — E11.9 TYPE 2 DIABETES MELLITUS WITHOUT COMPLICATION, WITH LONG-TERM CURRENT USE OF INSULIN (HCC): ICD-10-CM

## 2022-06-30 DIAGNOSIS — R26.89 BALANCE PROBLEM: ICD-10-CM

## 2022-06-30 DIAGNOSIS — M62.81 ACUTE LEFT-SIDED MUSCLE WEAKNESS: Primary | ICD-10-CM

## 2022-06-30 DIAGNOSIS — E78.01 FAMILIAL HYPERCHOLESTEROLEMIA: ICD-10-CM

## 2022-06-30 DIAGNOSIS — R29.6 FALLING EPISODES: ICD-10-CM

## 2022-06-30 DIAGNOSIS — I10 ESSENTIAL HYPERTENSION: ICD-10-CM

## 2022-06-30 DIAGNOSIS — Z79.4 TYPE 2 DIABETES MELLITUS WITHOUT COMPLICATION, WITH LONG-TERM CURRENT USE OF INSULIN (HCC): ICD-10-CM

## 2022-06-30 DIAGNOSIS — E11.22 TYPE 2 DIABETES MELLITUS WITH STAGE 2 CHRONIC KIDNEY DISEASE, WITH LONG-TERM CURRENT USE OF INSULIN (HCC): ICD-10-CM

## 2022-06-30 DIAGNOSIS — Z79.4 TYPE 2 DIABETES MELLITUS WITH STAGE 2 CHRONIC KIDNEY DISEASE, WITH LONG-TERM CURRENT USE OF INSULIN (HCC): ICD-10-CM

## 2022-06-30 DIAGNOSIS — Z95.5 H/O RIGHT CORONARY ARTERY STENT PLACEMENT: ICD-10-CM

## 2022-06-30 PROCEDURE — 1123F ACP DISCUSS/DSCN MKR DOCD: CPT | Performed by: NURSE PRACTITIONER

## 2022-06-30 PROCEDURE — 99214 OFFICE O/P EST MOD 30 MIN: CPT | Performed by: NURSE PRACTITIONER

## 2022-06-30 PROCEDURE — 1036F TOBACCO NON-USER: CPT | Performed by: NURSE PRACTITIONER

## 2022-06-30 PROCEDURE — 3288F FALL RISK ASSESSMENT DOCD: CPT | Performed by: NURSE PRACTITIONER

## 2022-06-30 PROCEDURE — 3044F HG A1C LEVEL LT 7.0%: CPT | Performed by: NURSE PRACTITIONER

## 2022-06-30 PROCEDURE — G8417 CALC BMI ABV UP PARAM F/U: HCPCS | Performed by: NURSE PRACTITIONER

## 2022-06-30 PROCEDURE — G8427 DOCREV CUR MEDS BY ELIG CLIN: HCPCS | Performed by: NURSE PRACTITIONER

## 2022-06-30 RX ORDER — CARVEDILOL 25 MG/1
TABLET ORAL
Qty: 180 TABLET | Refills: 1 | Status: SHIPPED | OUTPATIENT
Start: 2022-06-30

## 2022-06-30 RX ORDER — ISOSORBIDE MONONITRATE 60 MG/1
TABLET, EXTENDED RELEASE ORAL
Qty: 90 TABLET | Refills: 1 | Status: SHIPPED | OUTPATIENT
Start: 2022-06-30

## 2022-06-30 SDOH — ECONOMIC STABILITY: FOOD INSECURITY: WITHIN THE PAST 12 MONTHS, YOU WORRIED THAT YOUR FOOD WOULD RUN OUT BEFORE YOU GOT MONEY TO BUY MORE.: NEVER TRUE

## 2022-06-30 SDOH — ECONOMIC STABILITY: FOOD INSECURITY: WITHIN THE PAST 12 MONTHS, THE FOOD YOU BOUGHT JUST DIDN'T LAST AND YOU DIDN'T HAVE MONEY TO GET MORE.: NEVER TRUE

## 2022-06-30 ASSESSMENT — SOCIAL DETERMINANTS OF HEALTH (SDOH): HOW HARD IS IT FOR YOU TO PAY FOR THE VERY BASICS LIKE FOOD, HOUSING, MEDICAL CARE, AND HEATING?: NOT HARD AT ALL

## 2022-06-30 ASSESSMENT — PATIENT HEALTH QUESTIONNAIRE - PHQ9
SUM OF ALL RESPONSES TO PHQ QUESTIONS 1-9: 0
1. LITTLE INTEREST OR PLEASURE IN DOING THINGS: 0
SUM OF ALL RESPONSES TO PHQ9 QUESTIONS 1 & 2: 0
2. FEELING DOWN, DEPRESSED OR HOPELESS: 0
SUM OF ALL RESPONSES TO PHQ QUESTIONS 1-9: 0

## 2022-06-30 ASSESSMENT — ENCOUNTER SYMPTOMS
CHEST TIGHTNESS: 0
VOICE CHANGE: 0
WHEEZING: 0
TROUBLE SWALLOWING: 0
NAUSEA: 0
BLOOD IN STOOL: 0
VOMITING: 0
CONSTIPATION: 0
BACK PAIN: 0
DIARRHEA: 0
SHORTNESS OF BREATH: 0
ABDOMINAL PAIN: 0
COUGH: 0

## 2022-06-30 NOTE — PATIENT INSTRUCTIONS
Patient Education        Learning About Carbohydrate (Carb) Counting and Eating Out When You Have Diabetes  Why plan your meals? Meal planning can be a key part of managing diabetes. Planning meals and snacks with the right balance of carbohydrate, protein, and fat can help you keep yourblood sugar at the target level you set with your doctor. You don't have to eat special foods. You can eat what your family eats, including sweets once in a while. But you do have to pay attention to how oftenyou eat and how much you eat of certain foods. You may want to work with a dietitian or a diabetes educator. They can give you tips and meal ideas and can answer your questions about meal planning. This health professional can also help you reach a healthy weight if that is one ofyour goals. What should you know about eating carbs? Managing the amount of carbohydrate (carbs) you eat is an important part ofhealthy meals when you have diabetes. Carbohydrate is found in many foods.  Learn which foods have carbs. And learn the amounts of carbs in different foods. ? Bread, cereal, pasta, and rice have about 15 grams of carbs in a serving. A serving is 1 slice of bread (1 ounce), ½ cup of cooked cereal, or 1/3 cup of cooked pasta or rice. ? Fruits have 15 grams of carbs in a serving. A serving is 1 small fresh fruit, such as an apple or orange; ½ of a banana; ½ cup of cooked or canned fruit; ½ cup of fruit juice; 1 cup of melon or raspberries; or 2 tablespoons of dried fruit. ? Milk and no-sugar-added yogurt have 15 grams of carbs in a serving. A serving is 1 cup of milk or 3/4 cup (6 oz) of no-sugar-added yogurt. ? Starchy vegetables have 15 grams of carbs in a serving. A serving is ½ cup of mashed potatoes or sweet potato; 1 cup winter squash; ½ of a small baked potato; ½ cup of cooked beans; or ½ cup cooked corn or green peas.    Learn how much carbs to eat each day and at each meal. A dietitian or certified diabetes educator can teach you how to keep track of the amount of carbs you eat. This is called carbohydrate counting.  If you are not sure how to count carbohydrate grams, use the plate method to plan meals. It is a quick way to make sure that you have a balanced meal. It also can help you manage the amount of carbohydrate you eat at meals. ? Divide your plate by types of foods. Put non-starchy vegetables on half the plate, meat or other protein food on one-quarter of the plate, and a grain or starchy vegetable in the final quarter of the plate. To this you can add a small piece of fruit and 1 cup of milk or yogurt, depending on how many carbs you are supposed to eat at a meal.   Try to eat about the same amount of carbs at each meal. Do not \"save up\" your daily allowance of carbs to eat at one meal.   Proteins have very little or no carbs. Examples of proteins are beef, chicken, turkey, fish, eggs, tofu, cheese, cottage cheese, and peanut butter. How can you eat out and still eat healthy?  Learn to estimate the serving sizes of foods that have carbohydrate. If you measure food at home, it will be easier to estimate the amount in a serving of restaurant food.  If the meal you order has too much carbohydrate (such as potatoes, corn, or baked beans), ask to have a low-carbohydrate food instead. Ask for a salad or non-starchy vegetables like broccoli, cauliflower, green beans, or peppers.  If you eat more carbohydrate at a meal than you had planned, take a walk or do other exercise. This will help lower your blood sugar. What are some tips for eating healthy?  Limit saturated fat, such as the fat from meat and dairy products. This is a healthy choice because people who have diabetes are at higher risk of heart disease. So choose lean cuts of meat and nonfat or low-fat dairy products. Use olive or canola oil instead of butter or shortening when cooking.  Don't skip meals.  Your blood sugar may drop too low if you skip meals and take insulin or certain medicines for diabetes.  Check with your doctor before you drink alcohol. Alcohol can cause your blood sugar to drop too low. Alcohol can also cause a bad reaction if you take certain diabetes medicines. Follow-up care is a key part of your treatment and safety. Be sure to make and go to all appointments, and call your doctor if you are having problems. It's also a good idea to know your test results and keep alist of the medicines you take. Where can you learn more? Go to https://Vinglepepiceweb.[employer]. org and sign in to your [employer] account. Enter S910 in the [facility] box to learn more about \"Learning About Carbohydrate (Carb) Counting and Eating Out When You Have Diabetes. \"     If you do not have an account, please click on the \"Sign Up Now\" link. Current as of: September 8, 2021               Content Version: 13.3  © 2006-2022 [employer]. Care instructions adapted under license by [facility] ([facility]). If you have questions about a medical condition or this instruction, always ask your healthcare professional. [place] [address] any warranty or liability for your use of this information. Patient Education        Preventing Falls: Care Instructions  Your Care Instructions     Getting around your home safely can be a challenge if you have injuries or health problems that make it easy for you to fall. Loose rugs and furniture in walkways are among the dangers for many older people who have problems walking or who have poor eyesight. People who have conditions such as arthritis,osteoporosis, or dementia also have to be careful not to fall. You can make your home safer with a few simple measures. Follow-up care is a key part of your treatment and safety. Be sure to make and go to all appointments, and call your doctor if you are having problems.  It's also a good idea to know your test results and keep alist of the medicines you take. How can you care for yourself at home? Taking care of yourself   Exercise regularly to improve your strength, muscle tone, and balance. Walk if you can. Swimming may be a good choice if you cannot walk easily.  Have your vision and hearing checked each year or any time you notice a change. If you have trouble seeing and hearing, you might not be able to avoid objects and could lose your balance.  Know the side effects of the medicines you take. Ask your doctor or pharmacist whether the medicines you take can affect your balance. Sleeping pills or sedatives can affect your balance.  Limit the amount of alcohol you drink. Alcohol can impair your balance and other senses.  Ask your doctor whether calluses or corns on your feet need to be removed. If you wear loose-fitting shoes because of calluses or corns, you can lose your balance and fall.  Talk to your doctor if you have numbness in your feet.  You may get dizzy if you do not drink enough water. To prevent dehydration, drink plenty of fluids. Choose water and other clear liquids. If you have kidney, heart, or liver disease and have to limit fluids, talk with your doctor before you increase the amount of fluids you drink. Preventing falls at home   Remove raised doorway thresholds, throw rugs, and clutter. Repair loose carpet or raised areas in the floor. [facility] furniture and electrical cords to keep them out of walking paths.  Use nonskid floor wax, and wipe up spills right away, especially on ceramic tile floors.  If you use a walker or cane, put rubber tips on it. If you use crutches, clean the bottoms of them regularly with an abrasive pad, such as steel wool.  Keep your house well lit, especially Canda Herter, and outside walkways. Use night-lights in areas such as hallways and bathrooms.  Add extra light switches or use remote switches (such as switches that go on or off when you clap your hands) to make it easier to turn lights on if you have to get up during the night.  Install sturdy handrails on stairways.  Move items in your cabinets so that the things you use a lot are on the lower shelves (about waist level).  Keep a cordless phone and a flashlight with new batteries by your bed. If possible, put a phone in each of the main rooms of your house, or carry a cell phone in case you fall and cannot reach a phone. Or, you can wear a device around your neck or wrist. You push a button that sends a signal for help.  Wear low-heeled shoes that fit well and give your feet good support. Use footwear with nonskid soles. Check the heels and soles of your shoes for wear. Repair or replace worn heels or soles.  Do not wear socks without shoes on wood floors.  Walk on the grass when the sidewalks are slippery. If you live in an area that gets snow and ice in the winter, sprinkle salt on slippery steps and sidewalks. Or ask a family member or friend to do this for you. Preventing falls in the bath   Install grab bars and nonskid mats inside and outside your shower or tub and near the toilet and sinks.  Use shower chairs and bath benches.  Use a hand-held shower head that will allow you to sit while showering.  Get into a tub or shower by putting the weaker leg in first. Get out of a tub or shower with your strong side first.   Repair loose toilet seats and consider installing a raised toilet seat to make getting on and off the toilet easier.  Keep your bathroom door unlocked while you are in the shower. Where can you learn more? Go to https://chpepiceweb.[email or web address] org and sign in to your [employer] account. Enter [id number] [phone] in the [facility] box to learn more about \"Preventing Falls: Care Instructions. \"     If you do not have an account, please click on the \"Sign Up Now\" link. Current as of: September 8, 2021               Content Version: 13.3  © 9748-2540 [employer], [employer]. Care instructions adapted under license by [facility] ([facility]). If you have questions about a medical condition or this instruction, always ask your healthcare professional. [address] any warranty or liability for your use of this information. Patient Education        How to Get Up Safely After a Fall: Care Instructions  Your Care Instructions     If you have injuries, health problems, or other reasons that may make it easy for you to fall at home, it is a good idea to learn how to get up safely after a fall. Learning how to get up correctly can help you avoid making an injuryworse. Also, knowing what to do if you cannot get up can help you stay safe until helparrives. Follow-up care is a key part of your treatment and safety. Be sure to make and go to all appointments, and call your doctor if you are having problems. It's also a good idea to know your test results and keep alist of the medicines you take. How can you care for yourself after a fall? If you think you can get up  First lie still for a few minutes and think about how you feel. If your body feels okay and you think you can get up safely, follow the rest of the stepsbelow:  1. Look for a chair or other piece of furniture that is close to you. 2. Roll onto your side and rest. Roll by turning your head in the direction you want to roll, move your shoulder and arm, then hip and leg in the same direction. 3. Lie still for a moment to let your blood pressure adjust.  4. Slowly push your upper body up, lift your head, and take a moment to rest.  5. Slowly get up on your hands and knees, and crawl to the chair or other stable piece of furniture. 6. Put your hands on the chair. 7. Move one foot forward, and place it flat on the floor. Your other leg should be bent with the knee on the floor. 8. Rise slowly, turn your body, and sit in the chair. Stay seated for a bit and think about how you feel. Call for help.  Even if you feel okay, let someone know what happened to you. You might not know that you have a serious injury. If you cannot get up  1. If you think you are injured after a fall or you cannot get up, try not to panic. 2. Call out for help. 3. If you have a phone within reach or you have an emergency call device, use it to call for help. 4. If you do not have a phone within reach, try to slide yourself toward it. If you cannot get to the phone, try to slide toward a door or window or a place where you think you can be heard. 5. [place] or use an object to make noise so someone might hear you. 6. If you can reach something that you can use for a pillow, place it under your head. Try to stay warm by covering yourself with a blanket or clothing while you wait for help. When should you call for help? Call 911 anytime you think you may need emergency care. For example, call if:     You passed out (lost consciousness).      You cannot get up after a fall.      You have severe pain. Call your doctor now or seek immediate medical care if:     You have new or worse pain.      You are dizzy or lightheaded.      You hit your head. Watch closely for changes in your health, and be sure to contact your doctor if:     You do not get better as expected. Where can you learn more? Go to https://HiMomwinifredeb.[employer]. org and sign in to your [employer] account. Enter H933 in the [facility] box to learn more about \"How to Get Up Safely After a Fall: Care Instructions. \"     If you do not have an account, please click on the \"Sign Up Now\" link. Current as of: September 8, 2021               Content Version: 13.3  © 4717-9698 [employer]. Care instructions adapted under license by [employer].  If you have questions about a medical condition or this instruction, always ask your healthcare professional. [address] any warranty or liability for your use of this information.

## 2022-07-08 ENCOUNTER — TELEPHONE (OUTPATIENT)
Dept: FAMILY MEDICINE CLINIC | Age: 77
End: 2022-07-08

## 2022-07-08 DIAGNOSIS — N18.31 STAGE 3A CHRONIC KIDNEY DISEASE (HCC): Primary | ICD-10-CM

## 2022-07-08 NOTE — TELEPHONE ENCOUNTER
[facility] scheduling called. He is scheduled for a CT head with contrast in [place] on 7/26/2022 and they are asking for you to put in lab order to be done the same day for BUN, creatinine, and GFR. Thanks.

## 2022-07-13 NOTE — TELEPHONE ENCOUNTER
Patients last appointment 6/30/2022. Patients next scheduled appointment   Future Appointments   Date Time Provider Renata Tasha   7/26/2022  2:30 PM SEB CT 1-BD [facility]   11/1/2022  8:00 AM SCHEDULE, ERNESTO QUIÑONES [facility]   11/8/2022  9:15 AM VERENICE Welch CNP [facility]

## 2022-07-26 ENCOUNTER — HOSPITAL ENCOUNTER (OUTPATIENT)
Age: 77
Discharge: HOME | End: 2022-07-26
Payer: MEDICARE

## 2022-07-26 ENCOUNTER — HOSPITAL ENCOUNTER (OUTPATIENT)
Dept: CT IMAGING | Age: 77
Discharge: HOME | End: 2022-07-28
Payer: MEDICARE

## 2022-07-26 DIAGNOSIS — N18.31 STAGE 3A CHRONIC KIDNEY DISEASE (HCC): ICD-10-CM

## 2022-07-26 DIAGNOSIS — R26.89 BALANCE PROBLEM: ICD-10-CM

## 2022-07-26 DIAGNOSIS — M62.81 ACUTE LEFT-SIDED MUSCLE WEAKNESS: ICD-10-CM

## 2022-07-26 DIAGNOSIS — R29.6 FALLING EPISODES: ICD-10-CM

## 2022-07-26 LAB
ALBUMIN SERPL-MCNC: 3.7 G/DL (ref 3.5–5.2)
ALP BLD-CCNC: 79 U/L (ref 40–129)
ALT SERPL-CCNC: 23 U/L (ref 0–40)
ANION GAP SERPL CALCULATED.3IONS-SCNC: 8 MMOL/L (ref 7–16)
AST SERPL-CCNC: 23 U/L (ref 0–39)
BILIRUB SERPL-MCNC: 0.6 MG/DL (ref 0–1.2)
BUN BLDV-MCNC: 23 MG/DL (ref 6–23)
CALCIUM SERPL-MCNC: 8.8 MG/DL (ref 8.6–10.2)
CHLORIDE BLD-SCNC: 103 MMOL/L (ref 98–107)
CO2: 26 MMOL/L (ref 22–29)
CREAT SERPL-MCNC: 1.4 MG/DL (ref 0.7–1.2)
GFR AFRICAN AMERICAN: 59
GFR NON-AFRICAN AMERICAN: 49 ML/MIN/1.73
GLUCOSE BLD-MCNC: 94 MG/DL (ref 74–99)
POTASSIUM SERPL-SCNC: 4.6 MMOL/L (ref 3.5–5)
SODIUM BLD-SCNC: 137 MMOL/L (ref 132–146)
TOTAL PROTEIN: 6.6 G/DL (ref 6.4–8.3)

## 2022-07-26 PROCEDURE — 36415 COLL VENOUS BLD VENIPUNCTURE: CPT

## 2022-07-26 PROCEDURE — 2580000003 HC RX 258: Performed by: RADIOLOGY

## 2022-07-26 PROCEDURE — 6360000004 HC RX CONTRAST MEDICATION: Performed by: RADIOLOGY

## 2022-07-26 PROCEDURE — 70470 CT HEAD/BRAIN W/O & W/DYE: CPT

## 2022-07-26 PROCEDURE — 80053 COMPREHEN METABOLIC PANEL: CPT

## 2022-07-26 RX ORDER — SODIUM CHLORIDE 0.9 % (FLUSH) 0.9 %
10 SYRINGE (ML) INJECTION PRN
Status: COMPLETED | OUTPATIENT
Start: 2022-07-26 | End: 2022-07-26

## 2022-07-26 RX ADMIN — IOPAMIDOL 50 ML: 755 INJECTION, SOLUTION INTRAVENOUS at 14:56

## 2022-07-26 RX ADMIN — SODIUM CHLORIDE, PRESERVATIVE FREE 10 ML: 5 INJECTION INTRAVENOUS at 14:52

## 2022-07-27 ENCOUNTER — TELEPHONE (OUTPATIENT)
Dept: FAMILY MEDICINE CLINIC | Age: 77
End: 2022-07-27

## 2022-07-28 ENCOUNTER — HOSPITAL ENCOUNTER (EMERGENCY)
Age: 77
Discharge: HOME | End: 2022-07-28
Attending: EMERGENCY MEDICINE
Payer: COMMERCIAL

## 2022-07-28 ENCOUNTER — APPOINTMENT (OUTPATIENT)
Dept: GENERAL RADIOLOGY | Age: 77
End: 2022-07-28
Payer: COMMERCIAL

## 2022-07-28 ENCOUNTER — APPOINTMENT (OUTPATIENT)
Dept: CT IMAGING | Age: 77
End: 2022-07-28
Payer: COMMERCIAL

## 2022-07-28 VITALS
RESPIRATION RATE: 14 BRPM | SYSTOLIC BLOOD PRESSURE: 164 MMHG | HEIGHT: 70 IN | DIASTOLIC BLOOD PRESSURE: 71 MMHG | BODY MASS INDEX: 36.02 KG/M2 | WEIGHT: 251.6 LBS | HEART RATE: 55 BPM | OXYGEN SATURATION: 98 % | TEMPERATURE: 97.7 F

## 2022-07-28 DIAGNOSIS — S12.9XXA CLOSED FRACTURE OF TRANSVERSE PROCESS OF CERVICAL VERTEBRA, INITIAL ENCOUNTER (HCC): ICD-10-CM
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
## 2022-07-28 PROCEDURE — 74176 CT ABD & PELVIS W/O CONTRAST: CPT

## 2022-07-28 PROCEDURE — 72128 CT CHEST SPINE W/O DYE: CPT

## 2022-07-28 PROCEDURE — 96372 THER/PROPH/DIAG INJ SC/IM: CPT

## 2022-07-28 PROCEDURE — 99284 EMERGENCY DEPT VISIT MOD MDM: CPT

## 2022-07-28 PROCEDURE — 6360000002 HC RX W HCPCS: Performed by: STUDENT IN AN ORGANIZED HEALTH CARE EDUCATION/TRAINING PROGRAM

## 2022-07-28 PROCEDURE — 71250 CT THORAX DX C-: CPT

## 2022-07-28 PROCEDURE — 6370000000 HC RX 637 (ALT 250 FOR IP): Performed by: STUDENT IN AN ORGANIZED HEALTH CARE EDUCATION/TRAINING PROGRAM

## 2022-07-28 PROCEDURE — 72125 CT NECK SPINE W/O DYE: CPT

## 2022-07-28 PROCEDURE — 90471 IMMUNIZATION ADMIN: CPT | Performed by: STUDENT IN AN ORGANIZED HEALTH CARE EDUCATION/TRAINING PROGRAM

## 2022-07-28 PROCEDURE — 72131 CT LUMBAR SPINE W/O DYE: CPT

## 2022-07-28 PROCEDURE — 90714 TD VACC NO PRESV 7 YRS+ IM: CPT | Performed by: STUDENT IN AN ORGANIZED HEALTH CARE EDUCATION/TRAINING PROGRAM

## 2022-07-28 PROCEDURE — 73562 X-RAY EXAM OF KNEE 3: CPT

## 2022-07-28 PROCEDURE — 70450 CT HEAD/BRAIN W/O DYE: CPT

## 2022-07-28 RX ORDER — ACETAMINOPHEN 325 MG/1
650 TABLET ORAL ONCE
Status: COMPLETED | OUTPATIENT
Start: 2022-07-28 | End: 2022-07-28

## 2022-07-28 RX ORDER — NAPROXEN 500 MG/1
500 TABLET ORAL 2 TIMES DAILY
Qty: 20 TABLET | Refills: 0 | Status: SHIPPED | OUTPATIENT
Start: 2022-07-28 | End: 2022-09-06

## 2022-07-28 RX ORDER — ORPHENADRINE CITRATE 30 MG/ML
60 INJECTION INTRAMUSCULAR; INTRAVENOUS ONCE
Status: COMPLETED | OUTPATIENT
Start: 2022-07-28 | End: 2022-07-28

## 2022-07-28 RX ORDER — CYCLOBENZAPRINE HCL 5 MG
5 TABLET ORAL 3 TIMES DAILY PRN
Qty: 30 TABLET | Refills: 0 | Status: SHIPPED | OUTPATIENT
Start: 2022-07-28 | End: 2022-08-07

## 2022-07-28 RX ORDER — TETANUS AND DIPHTHERIA TOXOIDS ADSORBED 2; 2 [LF]/.5ML; [LF]/.5ML
0.5 INJECTION INTRAMUSCULAR ONCE
Status: COMPLETED | OUTPATIENT
Start: 2022-07-28 | End: 2022-07-28

## 2022-07-28 RX ADMIN — ACETAMINOPHEN 650 MG: 325 TABLET ORAL at 14:23

## 2022-07-28 RX ADMIN — ORPHENADRINE CITRATE 60 MG: 30 INJECTION INTRAMUSCULAR; INTRAVENOUS at 14:23

## 2022-07-28 RX ADMIN — TETANUS AND DIPHTHERIA TOXOIDS ADSORBED 0.5 ML: 2; 2 INJECTION INTRAMUSCULAR at 13:18

## 2022-07-28 ASSESSMENT — PAIN - FUNCTIONAL ASSESSMENT
PAIN_FUNCTIONAL_ASSESSMENT: 0-10
PAIN_FUNCTIONAL_ASSESSMENT: ACTIVITIES ARE NOT PREVENTED

## 2022-07-28 ASSESSMENT — ENCOUNTER SYMPTOMS
NAUSEA: 0
SINUS PRESSURE: 0
SHORTNESS OF BREATH: 0
DIARRHEA: 0
COUGH: 0
VOMITING: 0
ABDOMINAL PAIN: 0
EYE PAIN: 0
SORE THROAT: 0
EYE DISCHARGE: 0
BACK PAIN: 0
EYE REDNESS: 0
WHEEZING: 0

## 2022-07-28 ASSESSMENT — PAIN DESCRIPTION - LOCATION
LOCATION: BACK
LOCATION: CHEST

## 2022-07-28 ASSESSMENT — LIFESTYLE VARIABLES
HOW OFTEN DO YOU HAVE A DRINK CONTAINING ALCOHOL: NEVER
HOW MANY STANDARD DRINKS CONTAINING ALCOHOL DO YOU HAVE ON A TYPICAL DAY: PATIENT DOES NOT DRINK

## 2022-07-28 ASSESSMENT — PAIN SCALES - GENERAL
PAINLEVEL_OUTOF10: 4
PAINLEVEL_OUTOF10: 4

## 2022-07-28 ASSESSMENT — PAIN DESCRIPTION - DESCRIPTORS: DESCRIPTORS: ACHING

## 2022-07-28 NOTE — PROGRESS NOTES
CLINICAL PHARMACY NOTE: MEDS TO BEDS    Total # of Prescriptions Filled: 2   The following medications were delivered to the patient:  Naproxen 500 mg  Cyclobenzaprine 5 mg    Additional Documentation:

## 2022-07-28 NOTE — ED NOTES
Cleaned left shin with NS and covered with DSD, secured with tape.      Bianca Lopez RN  07/28/22 [id number]

## 2022-07-28 NOTE — DISCHARGE INSTRUCTIONS
Take all medications as prescribed  Follow-up with primary care doctor  Follow-up with neurosurgery  If you notice any new worrisome symptoms please return to emergency department for evaluation
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
## 2022-08-02 ENCOUNTER — OFFICE VISIT (OUTPATIENT)
Dept: FAMILY MEDICINE CLINIC | Age: 77
End: 2022-08-02
Payer: MEDICARE

## 2022-08-02 VITALS
DIASTOLIC BLOOD PRESSURE: 82 MMHG | BODY MASS INDEX: 36.18 KG/M2 | OXYGEN SATURATION: 97 % | TEMPERATURE: 97.6 F | HEIGHT: 70 IN | WEIGHT: 252.7 LBS | SYSTOLIC BLOOD PRESSURE: 138 MMHG | HEART RATE: 68 BPM

## 2022-08-02 DIAGNOSIS — N18.31 STAGE 3A CHRONIC KIDNEY DISEASE (HCC): Primary | ICD-10-CM

## 2022-08-02 DIAGNOSIS — E78.01 FAMILIAL HYPERCHOLESTEROLEMIA: ICD-10-CM

## 2022-08-02 DIAGNOSIS — Z79.4 TYPE 2 DIABETES MELLITUS WITHOUT COMPLICATION, WITH LONG-TERM CURRENT USE OF INSULIN (HCC): ICD-10-CM

## 2022-08-02 DIAGNOSIS — T14.8XXA BRUISING: ICD-10-CM

## 2022-08-02 DIAGNOSIS — E21.3 HYPERPARATHYROIDISM (HCC): ICD-10-CM

## 2022-08-02 DIAGNOSIS — Z95.5 H/O RIGHT CORONARY ARTERY STENT PLACEMENT: ICD-10-CM

## 2022-08-02 DIAGNOSIS — I10 ESSENTIAL HYPERTENSION: ICD-10-CM

## 2022-08-02 DIAGNOSIS — R26.89 BALANCE PROBLEM: ICD-10-CM

## 2022-08-02 DIAGNOSIS — E11.22 TYPE 2 DIABETES MELLITUS WITH CHRONIC KIDNEY DISEASE, WITHOUT LONG-TERM CURRENT USE OF INSULIN, UNSPECIFIED CKD STAGE (HCC): ICD-10-CM
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
## 2022-08-02 DIAGNOSIS — E11.9 TYPE 2 DIABETES MELLITUS WITHOUT COMPLICATION, WITH LONG-TERM CURRENT USE OF INSULIN (HCC): ICD-10-CM

## 2022-08-02 PROCEDURE — 1036F TOBACCO NON-USER: CPT | Performed by: NURSE PRACTITIONER

## 2022-08-02 PROCEDURE — G8427 DOCREV CUR MEDS BY ELIG CLIN: HCPCS | Performed by: NURSE PRACTITIONER

## 2022-08-02 PROCEDURE — 3044F HG A1C LEVEL LT 7.0%: CPT | Performed by: NURSE PRACTITIONER

## 2022-08-02 PROCEDURE — G8417 CALC BMI ABV UP PARAM F/U: HCPCS | Performed by: NURSE PRACTITIONER

## 2022-08-02 PROCEDURE — 99214 OFFICE O/P EST MOD 30 MIN: CPT | Performed by: NURSE PRACTITIONER

## 2022-08-02 PROCEDURE — 1123F ACP DISCUSS/DSCN MKR DOCD: CPT | Performed by: NURSE PRACTITIONER

## 2022-08-02 ASSESSMENT — ENCOUNTER SYMPTOMS
CHEST TIGHTNESS: 0
SHORTNESS OF BREATH: 0
TROUBLE SWALLOWING: 0
VOMITING: 0
ABDOMINAL PAIN: 0
WHEEZING: 0
VOICE CHANGE: 0
BACK PAIN: 0
NAUSEA: 0
BLOOD IN STOOL: 0
DIARRHEA: 0
CONSTIPATION: 0
COUGH: 0
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
## 2022-08-11 ENCOUNTER — OFFICE VISIT (OUTPATIENT)
Dept: FAMILY MEDICINE CLINIC | Age: 77
End: 2022-08-11
Payer: MEDICARE

## 2022-08-11 VITALS
BODY MASS INDEX: 36.45 KG/M2 | HEIGHT: 70 IN | DIASTOLIC BLOOD PRESSURE: 72 MMHG | HEART RATE: 59 BPM | WEIGHT: 254.6 LBS | SYSTOLIC BLOOD PRESSURE: 138 MMHG | RESPIRATION RATE: 17 BRPM | TEMPERATURE: 98.7 F | OXYGEN SATURATION: 95 %

## 2022-08-11 DIAGNOSIS — S30.1XXS HEMATOMA OF ABDOMINAL WALL, SEQUELA: Primary | ICD-10-CM

## 2022-08-11 DIAGNOSIS — I10 ESSENTIAL HYPERTENSION: ICD-10-CM

## 2022-08-11 DIAGNOSIS — E78.01 FAMILIAL HYPERCHOLESTEROLEMIA: ICD-10-CM

## 2022-08-11 DIAGNOSIS — T14.8XXA BRUISING: ICD-10-CM

## 2022-08-11 PROCEDURE — 1123F ACP DISCUSS/DSCN MKR DOCD: CPT | Performed by: NURSE PRACTITIONER

## 2022-08-11 PROCEDURE — 99213 OFFICE O/P EST LOW 20 MIN: CPT | Performed by: NURSE PRACTITIONER

## 2022-08-11 PROCEDURE — G8417 CALC BMI ABV UP PARAM F/U: HCPCS | Performed by: NURSE PRACTITIONER

## 2022-08-11 PROCEDURE — G8427 DOCREV CUR MEDS BY ELIG CLIN: HCPCS | Performed by: NURSE PRACTITIONER

## 2022-08-11 PROCEDURE — 1036F TOBACCO NON-USER: CPT | Performed by: NURSE PRACTITIONER

## 2022-08-11 ASSESSMENT — ENCOUNTER SYMPTOMS
COUGH: 0
TROUBLE SWALLOWING: 0
CHEST TIGHTNESS: 0
SHORTNESS OF BREATH: 0
DIARRHEA: 0
BACK PAIN: 0
WHEEZING: 0
NAUSEA: 0
VOMITING: 0
BLOOD IN STOOL: 0
CONSTIPATION: 0
ABDOMINAL PAIN: 1
VOICE CHANGE: 0
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
## 2022-08-15 RX ORDER — OMEPRAZOLE 20 MG/1
CAPSULE, DELAYED RELEASE ORAL
Qty: 90 CAPSULE | Refills: 1 | Status: SHIPPED | OUTPATIENT
Start: 2022-08-15

## 2022-09-06 ENCOUNTER — OFFICE VISIT (OUTPATIENT)
Dept: FAMILY MEDICINE CLINIC | Age: 77
End: 2022-09-06
Payer: MEDICARE

## 2022-09-06 VITALS
OXYGEN SATURATION: 97 % | HEART RATE: 54 BPM | TEMPERATURE: 98.2 F | BODY MASS INDEX: 36.36 KG/M2 | WEIGHT: 254 LBS | HEIGHT: 70 IN | SYSTOLIC BLOOD PRESSURE: 124 MMHG | DIASTOLIC BLOOD PRESSURE: 60 MMHG

## 2022-09-06 DIAGNOSIS — Z79.4 TYPE 2 DIABETES MELLITUS WITHOUT COMPLICATION, WITH LONG-TERM CURRENT USE OF INSULIN (HCC): ICD-10-CM

## 2022-09-06 DIAGNOSIS — Z79.4 TYPE 2 DIABETES MELLITUS WITH STAGE 3A CHRONIC KIDNEY DISEASE, WITH LONG-TERM CURRENT USE OF INSULIN (HCC): ICD-10-CM

## 2022-09-06 DIAGNOSIS — U07.1 COVID-19 VIRUS INFECTION: Primary | ICD-10-CM

## 2022-09-06 DIAGNOSIS — N18.31 TYPE 2 DIABETES MELLITUS WITH STAGE 3A CHRONIC KIDNEY DISEASE, WITH LONG-TERM CURRENT USE OF INSULIN (HCC): ICD-10-CM

## 2022-09-06 DIAGNOSIS — I10 ESSENTIAL HYPERTENSION: ICD-10-CM

## 2022-09-06 DIAGNOSIS — Z95.5 H/O RIGHT CORONARY ARTERY STENT PLACEMENT: ICD-10-CM

## 2022-09-06 DIAGNOSIS — E11.9 TYPE 2 DIABETES MELLITUS WITHOUT COMPLICATION, WITH LONG-TERM CURRENT USE OF INSULIN (HCC): ICD-10-CM

## 2022-09-06 DIAGNOSIS — E11.22 TYPE 2 DIABETES MELLITUS WITH STAGE 3A CHRONIC KIDNEY DISEASE, WITH LONG-TERM CURRENT USE OF INSULIN (HCC): ICD-10-CM

## 2022-09-06 DIAGNOSIS — R05.9 COUGH: ICD-10-CM

## 2022-09-06 DIAGNOSIS — N18.31 STAGE 3A CHRONIC KIDNEY DISEASE (HCC): ICD-10-CM

## 2022-09-06 LAB
Lab: ABNORMAL
PERFORMING INSTRUMENT: ABNORMAL
QC PASS/FAIL: ABNORMAL
SARS-COV-2, POC: DETECTED

## 2022-09-06 PROCEDURE — 3044F HG A1C LEVEL LT 7.0%: CPT | Performed by: NURSE PRACTITIONER

## 2022-09-06 PROCEDURE — G8417 CALC BMI ABV UP PARAM F/U: HCPCS | Performed by: NURSE PRACTITIONER

## 2022-09-06 PROCEDURE — G8427 DOCREV CUR MEDS BY ELIG CLIN: HCPCS | Performed by: NURSE PRACTITIONER

## 2022-09-06 PROCEDURE — 1036F TOBACCO NON-USER: CPT | Performed by: NURSE PRACTITIONER

## 2022-09-06 PROCEDURE — 99213 OFFICE O/P EST LOW 20 MIN: CPT | Performed by: NURSE PRACTITIONER

## 2022-09-06 PROCEDURE — 87426 SARSCOV CORONAVIRUS AG IA: CPT | Performed by: NURSE PRACTITIONER

## 2022-09-06 PROCEDURE — 1123F ACP DISCUSS/DSCN MKR DOCD: CPT | Performed by: NURSE PRACTITIONER

## 2022-09-06 NOTE — PROGRESS NOTES
Chief Complaint   Cough (Coughing for 2 days, fever, s/t. Tested positive for covid yesterday)            History of Present Illness   Source of history provided by:  patient. Radha Houston is a 65 y.o. old male who presents to the flu clinic with complaints of Cough (Coughing for 2 days, fever, s/t. Tested positive for covid yesterday) x 3 days. States symptoms have stayed the same since onset. Has been taking none without symptomatic relief. Denies any Shortness of breath. Denies any hx of no history of pneumonia or bronchitis. ROS   Pertinent positives and negatives are stated within HPI, all other systems reviewed and are negative. Past Medical History:  has a past medical history of CAD (coronary artery disease), Cancer ([facility] [address] Diabetes mellitus ([facility] [address] Diverticular disease of colon, DJD (degenerative joint disease), multiple sites, GERD (gastroesophageal reflux disease), Goiter, H/O thyroid nodule, Hx of malignant neoplasm of prostate, Hyperlipidemia, Hypertension, Hypothyroidism, and Status post insertion of drug eluting coronary artery stent. Past Surgical History:  has a past surgical history that includes Skin cancer excision; Colonoscopy; Total knee arthroplasty (Left, 12/17/2018); Thyroid surgery (09/01/2017); Carotid endarterectomy (05/25/2017); Cholecystectomy; hernia repair; Coronary artery bypass graft (10/11/2001); and Coronary angioplasty with stent (06/17/2021). Social History:  reports that he quit smoking about 38 years ago. His smoking use included cigarettes. He has a 5.00 pack-year smoking history. He has never used smokeless tobacco. He reports that he does not drink alcohol and does not use drugs. Family History: family history includes Cancer in his mother; Coronary Art Dis in his father; Diabetes in his mother; Heart Attack in his father. Allergies: Patient has no known allergies.     Physical Exam   Vital Signs:  /60 (Site: Right Upper Arm, Cuff Size: Large Adult)   Pulse 54   Temp 98.2 °F (36.8 °C)   Ht 5' 10\" (1.778 m)   Wt 254 lb (115.2 kg)   SpO2 97%   BMI 36.45 kg/m²    Oxygen Saturation Interpretation: Normal.    Constitutional:  Alert, development consistent with age. NAD. Head:  NC/NT. Airway patent. Ears: TMs Clear bilaterally. Canals without exudate or swelling bilaterally. Mouth: Posterior pharynx with mild erythema and clear postnasal drip. no tonsillar hypertrophy or exudate. Neck:  Normal ROM. Supple. no anterior cervical adenopathy noted. Lungs: CTAB without wheezes, rales, or rhonchi. CV:  Regular rate and rhythm, normal heart sounds, without pathological murmurs, ectopy, gallops, or rubs. Skin:  Normal turgor. Warm, dry, without visible rash. Lymphatic: No lymphangitis or adenopathy noted. Neurological:  Oriented. Motor functions intact. Lab / Imaging Results   (All laboratory and radiology results have been personally reviewed by myself)  Labs:  Results for orders placed or performed in visit on 09/06/22   POCT COVID-19, Antigen   Result Value Ref Range    SARS-COV-2, POC Detected (A) Not Detected    Lot Number [id number]     QC Pass/Fail pass     Performing Instrument BD Veritor        Imaging: All Radiology results interpreted by Radiologist unless otherwise noted. No results found. Medical Decision Making   Pt non-toxic, in no apparent distress and stable at time of discharge. Assessment/Plan   Vianney Garcia was seen today for cough.     Diagnoses and all orders for this visit:    COVID-19 virus infection  -     UT MONOCLONAL ANTIBODIES    Cough  -     POCT COVID-19, Antigen  -     UT MONOCLONAL ANTIBODIES    Essential hypertension  -     UT MONOCLONAL ANTIBODIES    Stage 3a chronic kidney disease (HCC)  -     UT MONOCLONAL ANTIBODIES    Type 2 diabetes mellitus without complication, with long-term current use of insulin (HCC)  -     UT MONOCLONAL ANTIBODIES    H/O right coronary artery stent placement  -     UT MONOCLONAL

## 2022-11-01 RX ORDER — ROSUVASTATIN CALCIUM 40 MG/1
40 TABLET, COATED ORAL EVERY EVENING
Qty: 90 TABLET | Refills: 1 | Status: SHIPPED | OUTPATIENT
Start: 2022-11-01 | End: 2023-04-30

## 2022-11-07 RX ORDER — LEVOTHYROXINE SODIUM 0.05 MG/1
50 TABLET ORAL DAILY
Qty: 90 TABLET | Refills: 1 | Status: SHIPPED | OUTPATIENT
Start: 2022-11-07 | End: 2023-02-05

## 2022-11-07 NOTE — TELEPHONE ENCOUNTER
Patients last appointment 9/6/22  Patients next scheduled appointment   Future Appointments   Date Time Provider Renata Cordova   11/23/2022  9:00 AM VERENICE Tee CNP [facility]

## 2022-11-23 ENCOUNTER — OFFICE VISIT (OUTPATIENT)
Dept: FAMILY MEDICINE CLINIC | Age: 77
End: 2022-11-23
Payer: MEDICARE

## 2022-11-23 VITALS
SYSTOLIC BLOOD PRESSURE: 122 MMHG | RESPIRATION RATE: 18 BRPM | TEMPERATURE: 98 F | WEIGHT: 254 LBS | BODY MASS INDEX: 36.36 KG/M2 | HEIGHT: 70 IN | HEART RATE: 65 BPM | OXYGEN SATURATION: 97 % | DIASTOLIC BLOOD PRESSURE: 70 MMHG

## 2022-11-23 DIAGNOSIS — E21.3 HYPERPARATHYROIDISM (HCC): ICD-10-CM

## 2022-11-23 DIAGNOSIS — I25.10 CORONARY ARTERY DISEASE INVOLVING NATIVE CORONARY ARTERY OF NATIVE HEART WITHOUT ANGINA PECTORIS: ICD-10-CM

## 2022-11-23 DIAGNOSIS — Z00.00 ROUTINE GENERAL MEDICAL EXAMINATION AT A HEALTH CARE FACILITY: ICD-10-CM

## 2022-11-23 DIAGNOSIS — E78.01 FAMILIAL HYPERCHOLESTEROLEMIA: ICD-10-CM

## 2022-11-23 DIAGNOSIS — I65.21 STENOSIS OF RIGHT CAROTID ARTERY: ICD-10-CM

## 2022-11-23 DIAGNOSIS — N18.32 TYPE 2 DIABETES MELLITUS WITH STAGE 3B CHRONIC KIDNEY DISEASE, WITH LONG-TERM CURRENT USE OF INSULIN (HCC): ICD-10-CM

## 2022-11-23 DIAGNOSIS — K21.9 GASTROESOPHAGEAL REFLUX DISEASE WITHOUT ESOPHAGITIS: ICD-10-CM

## 2022-11-23 DIAGNOSIS — Z79.4 TYPE 2 DIABETES MELLITUS WITHOUT COMPLICATION, WITH LONG-TERM CURRENT USE OF INSULIN (HCC): ICD-10-CM

## 2022-11-23 DIAGNOSIS — Z95.5 H/O RIGHT CORONARY ARTERY STENT PLACEMENT: ICD-10-CM

## 2022-11-23 DIAGNOSIS — Z98.890 HISTORY OF KNEE SURGERY: ICD-10-CM

## 2022-11-23 DIAGNOSIS — E11.9 TYPE 2 DIABETES MELLITUS WITHOUT COMPLICATION, WITH LONG-TERM CURRENT USE OF INSULIN (HCC): ICD-10-CM

## 2022-11-23 DIAGNOSIS — G47.33 OSA (OBSTRUCTIVE SLEEP APNEA): ICD-10-CM

## 2022-11-23 DIAGNOSIS — E11.22 TYPE 2 DIABETES MELLITUS WITH STAGE 3B CHRONIC KIDNEY DISEASE, WITH LONG-TERM CURRENT USE OF INSULIN (HCC): ICD-10-CM

## 2022-11-23 DIAGNOSIS — N18.31 STAGE 3A CHRONIC KIDNEY DISEASE (HCC): Primary | ICD-10-CM

## 2022-11-23 DIAGNOSIS — Z79.4 TYPE 2 DIABETES MELLITUS WITH STAGE 3B CHRONIC KIDNEY DISEASE, WITH LONG-TERM CURRENT USE OF INSULIN (HCC): ICD-10-CM

## 2022-11-23 DIAGNOSIS — K57.30 DIVERTICULAR DISEASE OF COLON: ICD-10-CM

## 2022-11-23 DIAGNOSIS — M15.9 PRIMARY OSTEOARTHRITIS INVOLVING MULTIPLE JOINTS: ICD-10-CM

## 2022-11-23 PROBLEM — E83.42 HYPOMAGNESEMIA: Status: ACTIVE | Noted: 2022-11-23

## 2022-11-23 PROCEDURE — 1123F ACP DISCUSS/DSCN MKR DOCD: CPT | Performed by: NURSE PRACTITIONER

## 2022-11-23 PROCEDURE — 3078F DIAST BP <80 MM HG: CPT | Performed by: NURSE PRACTITIONER

## 2022-11-23 PROCEDURE — 1036F TOBACCO NON-USER: CPT | Performed by: NURSE PRACTITIONER

## 2022-11-23 PROCEDURE — 99214 OFFICE O/P EST MOD 30 MIN: CPT | Performed by: NURSE PRACTITIONER

## 2022-11-23 PROCEDURE — G8484 FLU IMMUNIZE NO ADMIN: HCPCS | Performed by: NURSE PRACTITIONER

## 2022-11-23 PROCEDURE — 3074F SYST BP LT 130 MM HG: CPT | Performed by: NURSE PRACTITIONER

## 2022-11-23 PROCEDURE — G8427 DOCREV CUR MEDS BY ELIG CLIN: HCPCS | Performed by: NURSE PRACTITIONER

## 2022-11-23 PROCEDURE — 3044F HG A1C LEVEL LT 7.0%: CPT | Performed by: NURSE PRACTITIONER

## 2022-11-23 PROCEDURE — G8417 CALC BMI ABV UP PARAM F/U: HCPCS | Performed by: NURSE PRACTITIONER

## 2022-11-23 RX ORDER — CARVEDILOL 25 MG/1
TABLET ORAL
Qty: 180 TABLET | Refills: 1 | Status: SHIPPED | OUTPATIENT
Start: 2022-11-23

## 2022-11-23 RX ORDER — INSULIN DEGLUDEC 200 U/ML
55 INJECTION, SOLUTION SUBCUTANEOUS
Qty: 200 ADJUSTABLE DOSE PRE-FILLED PEN SYRINGE | Refills: 5 | Status: SHIPPED | OUTPATIENT
Start: 2022-11-23

## 2022-11-23 RX ORDER — BLOOD SUGAR DIAGNOSTIC
1 STRIP MISCELLANEOUS 2 TIMES DAILY
Qty: 100 STRIP | Refills: 5 | Status: SHIPPED | OUTPATIENT
Start: 2022-11-23 | End: 2023-01-12

## 2022-11-23 ASSESSMENT — ENCOUNTER SYMPTOMS
ABDOMINAL PAIN: 0
DIARRHEA: 0
NAUSEA: 0
COUGH: 0
SHORTNESS OF BREATH: 0
BACK PAIN: 0
CHEST TIGHTNESS: 0
TROUBLE SWALLOWING: 0
CONSTIPATION: 0
BLOOD IN STOOL: 0
VOICE CHANGE: 0
VOMITING: 0
WHEEZING: 0
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
## 2023-01-03 DIAGNOSIS — Z79.4 TYPE 2 DIABETES MELLITUS WITHOUT COMPLICATION, WITH LONG-TERM CURRENT USE OF INSULIN (HCC): ICD-10-CM

## 2023-01-03 DIAGNOSIS — E11.9 TYPE 2 DIABETES MELLITUS WITHOUT COMPLICATION, WITH LONG-TERM CURRENT USE OF INSULIN (HCC): ICD-10-CM

## 2023-01-03 RX ORDER — RANOLAZINE 1000 MG/1
TABLET, EXTENDED RELEASE ORAL
Qty: 180 TABLET | Refills: 1 | Status: SHIPPED | OUTPATIENT
Start: 2023-01-03

## 2023-01-03 RX ORDER — BLOOD SUGAR DIAGNOSTIC
1 STRIP MISCELLANEOUS 2 TIMES DAILY
Qty: 100 STRIP | Refills: 5 | Status: SHIPPED | OUTPATIENT
Start: 2023-01-03 | End: 2023-02-22

## 2023-01-03 RX ORDER — INSULIN DEGLUDEC 200 U/ML
55 INJECTION, SOLUTION SUBCUTANEOUS
Qty: 200 ADJUSTABLE DOSE PRE-FILLED PEN SYRINGE | Refills: 5 | Status: SHIPPED | OUTPATIENT
Start: 2023-01-03

## 2023-02-28 RX ORDER — CARVEDILOL 25 MG/1
TABLET ORAL
Qty: 180 TABLET | Refills: 1 | Status: SHIPPED | OUTPATIENT
Start: 2023-02-28

## 2023-03-20 RX ORDER — CLONIDINE HYDROCHLORIDE 0.1 MG/1
0.1 TABLET ORAL NIGHTLY
Qty: 60 TABLET | Refills: 2 | Status: SHIPPED | OUTPATIENT
Start: 2023-03-20

## 2023-03-20 RX ORDER — OMEPRAZOLE 20 MG/1
CAPSULE, DELAYED RELEASE ORAL
Qty: 90 CAPSULE | Refills: 1 | Status: SHIPPED | OUTPATIENT
Start: 2023-03-20

## 2023-03-28 LAB — DIABETIC RETINOPATHY: NEGATIVE

## 2023-04-04 ENCOUNTER — OFFICE VISIT (OUTPATIENT)
Dept: FAMILY MEDICINE CLINIC | Age: 78
End: 2023-04-04

## 2023-04-04 VITALS
OXYGEN SATURATION: 99 % | SYSTOLIC BLOOD PRESSURE: 118 MMHG | WEIGHT: 257.1 LBS | BODY MASS INDEX: 36.89 KG/M2 | TEMPERATURE: 98 F | HEART RATE: 58 BPM | DIASTOLIC BLOOD PRESSURE: 78 MMHG | RESPIRATION RATE: 16 BRPM

## 2023-04-04 DIAGNOSIS — G47.33 OSA (OBSTRUCTIVE SLEEP APNEA): Primary | ICD-10-CM

## 2023-04-04 DIAGNOSIS — N18.32 TYPE 2 DIABETES MELLITUS WITH STAGE 3B CHRONIC KIDNEY DISEASE, WITH LONG-TERM CURRENT USE OF INSULIN (HCC): ICD-10-CM

## 2023-04-04 DIAGNOSIS — E66.01 SEVERE OBESITY (BMI 35.0-39.9) WITH COMORBIDITY (HCC): ICD-10-CM

## 2023-04-04 DIAGNOSIS — N18.31 STAGE 3A CHRONIC KIDNEY DISEASE (HCC): ICD-10-CM

## 2023-04-04 DIAGNOSIS — Z79.4 TYPE 2 DIABETES MELLITUS WITH STAGE 3B CHRONIC KIDNEY DISEASE, WITH LONG-TERM CURRENT USE OF INSULIN (HCC): ICD-10-CM

## 2023-04-04 DIAGNOSIS — E21.3 HYPERPARATHYROIDISM (HCC): ICD-10-CM

## 2023-04-04 DIAGNOSIS — C61 MALIGNANT NEOPLASM OF PROSTATE (HCC): ICD-10-CM

## 2023-04-04 DIAGNOSIS — E11.22 TYPE 2 DIABETES MELLITUS WITH STAGE 3B CHRONIC KIDNEY DISEASE, WITH LONG-TERM CURRENT USE OF INSULIN (HCC): ICD-10-CM

## 2023-04-04 SDOH — ECONOMIC STABILITY: FOOD INSECURITY: WITHIN THE PAST 12 MONTHS, YOU WORRIED THAT YOUR FOOD WOULD RUN OUT BEFORE YOU GOT MONEY TO BUY MORE.: NEVER TRUE

## 2023-04-04 SDOH — ECONOMIC STABILITY: INCOME INSECURITY: HOW HARD IS IT FOR YOU TO PAY FOR THE VERY BASICS LIKE FOOD, HOUSING, MEDICAL CARE, AND HEATING?: NOT HARD AT ALL

## 2023-04-04 SDOH — ECONOMIC STABILITY: FOOD INSECURITY: WITHIN THE PAST 12 MONTHS, THE FOOD YOU BOUGHT JUST DIDN'T LAST AND YOU DIDN'T HAVE MONEY TO GET MORE.: NEVER TRUE

## 2023-04-04 SDOH — ECONOMIC STABILITY: HOUSING INSECURITY
IN THE LAST 12 MONTHS, WAS THERE A TIME WHEN YOU DID NOT HAVE A STEADY PLACE TO SLEEP OR SLEPT IN A SHELTER (INCLUDING NOW)?: NO

## 2023-04-04 ASSESSMENT — ENCOUNTER SYMPTOMS
DIARRHEA: 0
BLOOD IN STOOL: 0
NAUSEA: 0
CHEST TIGHTNESS: 0
SHORTNESS OF BREATH: 0
ABDOMINAL PAIN: 0
VOMITING: 0
BACK PAIN: 0
TROUBLE SWALLOWING: 0
WHEEZING: 0
COUGH: 0
CONSTIPATION: 0
VOICE CHANGE: 0

## 2023-04-04 ASSESSMENT — PATIENT HEALTH QUESTIONNAIRE - PHQ9
SUM OF ALL RESPONSES TO PHQ9 QUESTIONS 1 & 2: 0
2. FEELING DOWN, DEPRESSED OR HOPELESS: 0
SUM OF ALL RESPONSES TO PHQ QUESTIONS 1-9: 0
SUM OF ALL RESPONSES TO PHQ QUESTIONS 1-9: 0
1. LITTLE INTEREST OR PLEASURE IN DOING THINGS: 0
SUM OF ALL RESPONSES TO PHQ QUESTIONS 1-9: 0
SUM OF ALL RESPONSES TO PHQ QUESTIONS 1-9: 0

## 2023-04-04 NOTE — PROGRESS NOTES
Screening - Adults and Diabetic Eye Exam      (TSH 3.66) Thyroid Dysfunction: Screening --      Lab Results   Component Value Date    PSADIA 6.92 (H) 01/11/2023    PSADIA 6.43 (H) 11/16/2022    PSADIA 4.79 (H) 05/20/2022      (  ) Prostate Cancer: Prostate-Specific Antigen (PSA)-Based Screening -- All Men  PSA 2.5 Mar 2020  PSA 3.1 Sep 2019  PSA 2.7 Feb 2018      Educational materials printed for patient's review and were included in patient instructions on his After Visit Summary and given to patient at the end of visit. Counseled regarding above diagnosis, including possible risks and complications,  especially if left uncontrolled. Counseled regarding the possible side effects, risks, benefits and alternatives to treatment; patient and/or guardian verbalizes understanding, agrees, feels comfortable with and wishes to proceed with above treatment plan. Advised patient to call with any new medication issues, and read all Rx info from pharmacy to assure aware of all possible risks and side effects of medication before taking. Reviewed age and gender appropriate health screening exams and vaccinations. Advised patient regarding importance of keeping up with recommended health maintenance and to schedule as soon as possible if overdue, as this is important in assessing for undiagnosed pathology, especially cancer, as well as protecting against potentially harmful/life threatening disease. Patient verbalizes understanding and agrees with above counseling, assessment and plan. All questions answered. On 04/04/23 I have spent 30 reviewing previous notes, test results and face to face with the patient discussing the diagnosis and importance of compliance with the treatment plan as well as documenting on the day of the visit.   Educational materials exercises printed for patient's review and were included in patient instructions on their After Visit Summary and given to patient at the end of

## 2023-05-03 RX ORDER — ROSUVASTATIN CALCIUM 40 MG/1
40 TABLET, COATED ORAL EVERY EVENING
Qty: 90 TABLET | Refills: 1 | Status: SHIPPED | OUTPATIENT
Start: 2023-05-03 | End: 2023-10-30

## 2023-05-10 LAB
ALBUMIN SERPL-MCNC: 3.5 G/DL (ref 3.5–5.2)
ALP SERPL-CCNC: 76 U/L (ref 40–129)
ALT SERPL-CCNC: 14 U/L (ref 0–40)
ANION GAP SERPL CALCULATED.3IONS-SCNC: 14 MMOL/L (ref 7–16)
AST SERPL-CCNC: 23 U/L (ref 0–39)
BILIRUB SERPL-MCNC: 0.4 MG/DL (ref 0–1.2)
BUN SERPL-MCNC: 24 MG/DL (ref 6–23)
CALCIUM SERPL-MCNC: 9.2 MG/DL (ref 8.6–10.2)
CHLORIDE SERPL-SCNC: 105 MMOL/L (ref 98–107)
CO2 SERPL-SCNC: 24 MMOL/L (ref 22–29)
CREAT SERPL-MCNC: 1.6 MG/DL (ref 0.7–1.2)
CREAT UR-MCNC: 52 MG/DL (ref 40–278)
ERYTHROCYTE [DISTWIDTH] IN BLOOD BY AUTOMATED COUNT: 12.7 FL (ref 11.5–15)
GLUCOSE SERPL-MCNC: 122 MG/DL (ref 74–99)
HCT VFR BLD AUTO: 38 % (ref 37–54)
HGB BLD-MCNC: 12 G/DL (ref 12.5–16.5)
MAGNESIUM SERPL-MCNC: 1.9 MG/DL (ref 1.6–2.6)
MCH RBC QN AUTO: 30 PG (ref 26–35)
MCHC RBC AUTO-ENTMCNC: 31.6 % (ref 32–34.5)
MCV RBC AUTO: 95 FL (ref 80–99.9)
MICROALBUMIN UR-MCNC: 653.4 MG/L
MICROALBUMIN/CREAT UR-RTO: 1256.5 (ref 0–30)
PHOSPHATE SERPL-MCNC: 4.3 MG/DL (ref 2.5–4.5)
PLATELET # BLD AUTO: 162 E9/L (ref 130–450)
PMV BLD AUTO: 12 FL (ref 7–12)
POTASSIUM SERPL-SCNC: 4.8 MMOL/L (ref 3.5–5)
PROT SERPL-MCNC: 6.7 G/DL (ref 6.4–8.3)
PROT UR-MCNC: 91 MG/DL (ref 0–12)
PROTEIN/CREAT RATIO: 1.8
PROTEIN/CREAT RATIO: 1.8 (ref 0–0.2)
RBC # BLD AUTO: 4 E12/L (ref 3.8–5.8)
SODIUM SERPL-SCNC: 143 MMOL/L (ref 132–146)
URATE SERPL-MCNC: 7.6 MG/DL (ref 3.4–7)
WBC # BLD: 6.5 E9/L (ref 4.5–11.5)

## 2023-05-10 RX ORDER — LEVOTHYROXINE SODIUM 0.05 MG/1
TABLET ORAL
Qty: 90 TABLET | Refills: 0 | Status: SHIPPED | OUTPATIENT
Start: 2023-05-10

## 2023-05-25 ENCOUNTER — NURSE ONLY (OUTPATIENT)
Dept: FAMILY MEDICINE CLINIC | Age: 78
End: 2023-05-25

## 2023-05-25 DIAGNOSIS — Z79.4 TYPE 2 DIABETES MELLITUS WITH STAGE 3B CHRONIC KIDNEY DISEASE, WITH LONG-TERM CURRENT USE OF INSULIN (HCC): ICD-10-CM

## 2023-05-25 DIAGNOSIS — E78.01 FAMILIAL HYPERCHOLESTEROLEMIA: ICD-10-CM

## 2023-05-25 DIAGNOSIS — E11.22 TYPE 2 DIABETES MELLITUS WITH STAGE 3B CHRONIC KIDNEY DISEASE, WITH LONG-TERM CURRENT USE OF INSULIN (HCC): ICD-10-CM

## 2023-05-25 DIAGNOSIS — N18.32 TYPE 2 DIABETES MELLITUS WITH STAGE 3B CHRONIC KIDNEY DISEASE, WITH LONG-TERM CURRENT USE OF INSULIN (HCC): ICD-10-CM

## 2023-05-25 DIAGNOSIS — I10 PRIMARY HYPERTENSION: ICD-10-CM

## 2023-05-25 DIAGNOSIS — E03.9 HYPOTHYROIDISM, UNSPECIFIED TYPE: ICD-10-CM

## 2023-05-25 DIAGNOSIS — I10 PRIMARY HYPERTENSION: Primary | ICD-10-CM

## 2023-05-25 LAB
BACTERIA URNS QL MICRO: NORMAL /HPF
BILIRUB UR QL STRIP: NEGATIVE
CHOLESTEROL, TOTAL: 130 MG/DL (ref 0–199)
CLARITY UR: CLEAR
COLOR UR: YELLOW
GLUCOSE UR STRIP-MCNC: NEGATIVE MG/DL
HBA1C MFR BLD: 5.6 % (ref 4–5.6)
HDLC SERPL-MCNC: 33 MG/DL
HGB UR QL STRIP: NEGATIVE
KETONES UR STRIP-MCNC: NEGATIVE MG/DL
LDLC SERPL CALC-MCNC: 69 MG/DL (ref 0–99)
LEUKOCYTE ESTERASE UR QL STRIP: NEGATIVE
NITRITE UR QL STRIP: NEGATIVE
PH UR STRIP: 7.5 [PH] (ref 5–9)
PROT UR STRIP-MCNC: 100 MG/DL
RBC #/AREA URNS HPF: NORMAL /HPF (ref 0–2)
SP GR UR STRIP: 1.02 (ref 1–1.03)
TRIGL SERPL-MCNC: 139 MG/DL (ref 0–149)
TSH SERPL-MCNC: 2.88 UIU/ML (ref 0.27–4.2)
UROBILINOGEN UR STRIP-ACNC: 0.2 E.U./DL
VLDLC SERPL CALC-MCNC: 28 MG/DL
WBC #/AREA URNS HPF: NORMAL /HPF (ref 0–5)

## 2023-05-30 ENCOUNTER — OFFICE VISIT (OUTPATIENT)
Dept: FAMILY MEDICINE CLINIC | Age: 78
End: 2023-05-30
Payer: MEDICARE

## 2023-05-30 VITALS
SYSTOLIC BLOOD PRESSURE: 110 MMHG | WEIGHT: 256 LBS | HEART RATE: 52 BPM | HEIGHT: 70 IN | OXYGEN SATURATION: 96 % | RESPIRATION RATE: 18 BRPM | BODY MASS INDEX: 36.65 KG/M2 | TEMPERATURE: 97.9 F | DIASTOLIC BLOOD PRESSURE: 65 MMHG

## 2023-05-30 VITALS
HEART RATE: 52 BPM | HEIGHT: 70 IN | DIASTOLIC BLOOD PRESSURE: 65 MMHG | BODY MASS INDEX: 36.65 KG/M2 | RESPIRATION RATE: 18 BRPM | WEIGHT: 256 LBS | OXYGEN SATURATION: 96 % | TEMPERATURE: 97.9 F | SYSTOLIC BLOOD PRESSURE: 110 MMHG

## 2023-05-30 DIAGNOSIS — C61 MALIGNANT NEOPLASM OF PROSTATE (HCC): ICD-10-CM

## 2023-05-30 DIAGNOSIS — E66.01 SEVERE OBESITY (BMI 35.0-39.9) WITH COMORBIDITY (HCC): ICD-10-CM

## 2023-05-30 DIAGNOSIS — G47.33 OSA (OBSTRUCTIVE SLEEP APNEA): ICD-10-CM

## 2023-05-30 DIAGNOSIS — E78.01 FAMILIAL HYPERCHOLESTEROLEMIA: ICD-10-CM

## 2023-05-30 DIAGNOSIS — E03.9 HYPOTHYROIDISM, UNSPECIFIED TYPE: ICD-10-CM

## 2023-05-30 DIAGNOSIS — E11.22 TYPE 2 DIABETES MELLITUS WITH STAGE 3B CHRONIC KIDNEY DISEASE, WITH LONG-TERM CURRENT USE OF INSULIN (HCC): Primary | ICD-10-CM

## 2023-05-30 DIAGNOSIS — Z79.4 TYPE 2 DIABETES MELLITUS WITH STAGE 3B CHRONIC KIDNEY DISEASE, WITH LONG-TERM CURRENT USE OF INSULIN (HCC): Primary | ICD-10-CM

## 2023-05-30 DIAGNOSIS — I25.10 CORONARY ARTERY DISEASE INVOLVING NATIVE CORONARY ARTERY OF NATIVE HEART WITHOUT ANGINA PECTORIS: ICD-10-CM

## 2023-05-30 DIAGNOSIS — N18.32 TYPE 2 DIABETES MELLITUS WITH STAGE 3B CHRONIC KIDNEY DISEASE, WITH LONG-TERM CURRENT USE OF INSULIN (HCC): Primary | ICD-10-CM

## 2023-05-30 DIAGNOSIS — Z00.00 MEDICARE ANNUAL WELLNESS VISIT, SUBSEQUENT: Primary | ICD-10-CM

## 2023-05-30 DIAGNOSIS — I10 PRIMARY HYPERTENSION: ICD-10-CM

## 2023-05-30 DIAGNOSIS — N18.31 STAGE 3A CHRONIC KIDNEY DISEASE (HCC): ICD-10-CM

## 2023-05-30 DIAGNOSIS — Z95.5 H/O RIGHT CORONARY ARTERY STENT PLACEMENT: ICD-10-CM

## 2023-05-30 PROCEDURE — 3044F HG A1C LEVEL LT 7.0%: CPT | Performed by: NURSE PRACTITIONER

## 2023-05-30 PROCEDURE — 3078F DIAST BP <80 MM HG: CPT | Performed by: NURSE PRACTITIONER

## 2023-05-30 PROCEDURE — G8427 DOCREV CUR MEDS BY ELIG CLIN: HCPCS | Performed by: NURSE PRACTITIONER

## 2023-05-30 PROCEDURE — 1123F ACP DISCUSS/DSCN MKR DOCD: CPT | Performed by: NURSE PRACTITIONER

## 2023-05-30 PROCEDURE — 3074F SYST BP LT 130 MM HG: CPT | Performed by: NURSE PRACTITIONER

## 2023-05-30 PROCEDURE — 1036F TOBACCO NON-USER: CPT | Performed by: NURSE PRACTITIONER

## 2023-05-30 PROCEDURE — 99214 OFFICE O/P EST MOD 30 MIN: CPT | Performed by: NURSE PRACTITIONER

## 2023-05-30 PROCEDURE — G0439 PPPS, SUBSEQ VISIT: HCPCS | Performed by: NURSE PRACTITIONER

## 2023-05-30 PROCEDURE — G8417 CALC BMI ABV UP PARAM F/U: HCPCS | Performed by: NURSE PRACTITIONER

## 2023-05-30 RX ORDER — SACUBITRIL AND VALSARTAN 49; 51 MG/1; MG/1
TABLET, FILM COATED ORAL
COMMUNITY
Start: 2023-04-28

## 2023-05-30 RX ORDER — AMLODIPINE BESYLATE 5 MG/1
5 TABLET ORAL DAILY
COMMUNITY
Start: 2023-05-06

## 2023-05-30 SDOH — ECONOMIC STABILITY: INCOME INSECURITY: HOW HARD IS IT FOR YOU TO PAY FOR THE VERY BASICS LIKE FOOD, HOUSING, MEDICAL CARE, AND HEATING?: NOT HARD AT ALL

## 2023-05-30 SDOH — ECONOMIC STABILITY: FOOD INSECURITY: WITHIN THE PAST 12 MONTHS, THE FOOD YOU BOUGHT JUST DIDN'T LAST AND YOU DIDN'T HAVE MONEY TO GET MORE.: NEVER TRUE

## 2023-05-30 SDOH — ECONOMIC STABILITY: FOOD INSECURITY: WITHIN THE PAST 12 MONTHS, YOU WORRIED THAT YOUR FOOD WOULD RUN OUT BEFORE YOU GOT MONEY TO BUY MORE.: NEVER TRUE

## 2023-05-30 ASSESSMENT — PATIENT HEALTH QUESTIONNAIRE - PHQ9
DEPRESSION UNABLE TO ASSESS: PT REFUSES
SUM OF ALL RESPONSES TO PHQ QUESTIONS 1-9: 0
1. LITTLE INTEREST OR PLEASURE IN DOING THINGS: 0
SUM OF ALL RESPONSES TO PHQ QUESTIONS 1-9: 0
SUM OF ALL RESPONSES TO PHQ QUESTIONS 1-9: 0
2. FEELING DOWN, DEPRESSED OR HOPELESS: 0
SUM OF ALL RESPONSES TO PHQ9 QUESTIONS 1 & 2: 0
SUM OF ALL RESPONSES TO PHQ QUESTIONS 1-9: 0

## 2023-05-30 ASSESSMENT — ENCOUNTER SYMPTOMS
DIARRHEA: 0
VOICE CHANGE: 0
CONSTIPATION: 0
CHEST TIGHTNESS: 0
TROUBLE SWALLOWING: 0
BLOOD IN STOOL: 0
ABDOMINAL PAIN: 0
SHORTNESS OF BREATH: 0
NAUSEA: 0
COUGH: 0
WHEEZING: 0
BACK PAIN: 0
VOMITING: 0

## 2023-05-30 NOTE — PROGRESS NOTES
years: 5.00     Types: Cigarettes     Quit date: 1984     Years since quittin.3    Smokeless tobacco: Never   Vaping Use    Vaping Use: Never used   Substance and Sexual Activity    Alcohol use: Never    Drug use: Never    Sexual activity: Not Currently     Partners: Female   Other Topics Concern    Not on file   Social History Narrative    Not on file     Social Determinants of Health     Financial Resource Strain: Low Risk     Difficulty of Paying Living Expenses: Not hard at all   Food Insecurity: No Food Insecurity    Worried About [address] in the Last Year: Never true    [facility] [address] in the Last Year: Never true   Transportation Needs: Unknown    Lack of Transportation (Medical): Not on file    Lack of Transportation (Non-Medical): No   Physical Activity: Insufficiently Active    Days of Exercise per Week: 7 days    Minutes of Exercise per Session: 20 min   Stress: Not on file   Social Connections: Not on file   Intimate Partner Violence: Not on file   Housing Stability: Unknown    Unable to Pay for Housing in the Last Year: Not on file    Number of Places Lived in the Last Year: Not on file    Unstable Housing in the Last Year: No       Vitals:    23 0931   BP: 110/65   Pulse: 52   Resp: 18   Temp: 97.9 °F (36.6 °C)   TempSrc: Temporal   SpO2: 96%   Weight: 256 lb (116.1 kg)   Height: 5' 10\" (1.778 m)       Physical Exam  Vitals and nursing note reviewed. Constitutional:       Appearance: Normal appearance. HENT:      Head: Normocephalic. Right Ear: Tympanic membrane and ear canal normal. There is no impacted cerumen. Left Ear: Tympanic membrane and ear canal normal. There is no impacted cerumen. Nose: Nose normal.      Mouth/Throat:      Mouth: Mucous membranes are dry. Eyes:      Extraocular Movements: Extraocular movements intact. Pupils: Pupils are equal, round, and reactive to light. Neck:      Vascular: No carotid bruit.    Cardiovascular:

## 2023-06-19 RX ORDER — RANOLAZINE 1000 MG/1
TABLET, EXTENDED RELEASE ORAL
Qty: 180 TABLET | Refills: 1 | Status: SHIPPED | OUTPATIENT
Start: 2023-06-19

## 2023-06-19 NOTE — TELEPHONE ENCOUNTER
Patients last appointment 5/30/2023. Patients next scheduled appointment   Future Appointments   Date Time Provider Renata Cordova   11/27/2023  8:00 AM SCHEDULE, ERNESTO QUIÑONES [facility]   11/30/2023  9:00 AM VERENICE Guevara CNP [facility]   6/4/2024  9:00 AM VERENICE Guevara CNP [facility]     Refill

## 2023-07-17 RX ORDER — ROSUVASTATIN CALCIUM 40 MG/1
40 TABLET, COATED ORAL EVERY EVENING
Qty: 90 TABLET | Refills: 1 | Status: SHIPPED | OUTPATIENT
Start: 2023-07-17 | End: 2024-01-13

## 2023-07-17 NOTE — TELEPHONE ENCOUNTER
Patients last appointment 5/30/2023. Patients next scheduled appointment   Future Appointments   Date Time Provider [address]   11/27/2023  8:00 AM SCHEDULE, ERNESTO QUIÑONES [facility]   11/30/2023  9:00 AM VERENICE Davies CNP [facility]   6/4/2024  9:00 AM VERENICE Davies CNP [facility]

## 2023-08-02 RX ORDER — LEVOTHYROXINE SODIUM 0.05 MG/1
TABLET ORAL
Qty: 90 TABLET | Refills: 1 | Status: SHIPPED | OUTPATIENT
Start: 2023-08-02

## 2023-08-15 RX ORDER — CARVEDILOL 25 MG/1
TABLET ORAL
Qty: 180 TABLET | Refills: 0 | Status: SHIPPED | OUTPATIENT
Start: 2023-08-15

## 2023-08-15 NOTE — TELEPHONE ENCOUNTER
Patients last appointment 5/30/2023. Patients next scheduled appointment   Future Appointments   Date Time Provider [address]   11/27/2023  8:00 AM SCHEDULE, ERNESTO HOFFMAN [facility]   11/30/2023  9:00 AM VERENICE Espinosa CNP [facility]   6/4/2024  9:00 AM VERENICE Espinosa CNP [facility]

## 2023-08-18 LAB
25(OH)D3 SERPL-MCNC: 25.8 NG/ML (ref 30–100)
ALBUMIN SERPL-MCNC: 3.4 G/DL (ref 3.5–5.2)
ALP SERPL-CCNC: 85 U/L (ref 40–129)
ALT SERPL-CCNC: 14 U/L (ref 0–40)
ANION GAP SERPL CALCULATED.3IONS-SCNC: 13 MMOL/L (ref 7–16)
AST SERPL-CCNC: 16 U/L (ref 0–39)
BASOPHILS # BLD: 0.05 K/UL (ref 0–0.2)
BASOPHILS NFR BLD: 1 % (ref 0–2)
BILIRUB SERPL-MCNC: 0.3 MG/DL (ref 0–1.2)
BILIRUB UR QL STRIP: NEGATIVE
BUN SERPL-MCNC: 21 MG/DL (ref 6–23)
CALCIUM SERPL-MCNC: 8.8 MG/DL (ref 8.6–10.2)
CHLORIDE SERPL-SCNC: 104 MMOL/L (ref 98–107)
CLARITY UR: CLEAR
CO2 SERPL-SCNC: 24 MMOL/L (ref 22–29)
COLOR UR: YELLOW
CREAT SERPL-MCNC: 1.5 MG/DL (ref 0.7–1.2)
CREAT UR-MCNC: 120.2 MG/DL (ref 40–278)
EOSINOPHIL # BLD: 0.33 K/UL (ref 0.05–0.5)
EOSINOPHILS RELATIVE PERCENT: 5 % (ref 0–6)
ERYTHROCYTE [DISTWIDTH] IN BLOOD BY AUTOMATED COUNT: 13.2 % (ref 11.5–15)
GFR SERPL CREATININE-BSD FRML MDRD: 47 ML/MIN/1.73M2
GLUCOSE SERPL-MCNC: 151 MG/DL (ref 74–99)
GLUCOSE UR STRIP-MCNC: NEGATIVE MG/DL
HCT VFR BLD AUTO: 37 % (ref 37–54)
HGB BLD-MCNC: 12 G/DL (ref 12.5–16.5)
HGB UR QL STRIP.AUTO: NEGATIVE
IMM GRANULOCYTES # BLD AUTO: <0.03 K/UL (ref 0–0.58)
IMM GRANULOCYTES NFR BLD: 0 % (ref 0–5)
KETONES UR STRIP-MCNC: NEGATIVE MG/DL
LEUKOCYTE ESTERASE UR QL STRIP: NEGATIVE
LYMPHOCYTES NFR BLD: 1.33 K/UL (ref 1.5–4)
LYMPHOCYTES RELATIVE PERCENT: 19 % (ref 20–42)
MAGNESIUM SERPL-MCNC: 2 MG/DL (ref 1.6–2.6)
MCH RBC QN AUTO: 30.5 PG (ref 26–35)
MCHC RBC AUTO-ENTMCNC: 32.4 G/DL (ref 32–34.5)
MCV RBC AUTO: 93.9 FL (ref 80–99.9)
MICROALBUMIN UR-MCNC: 1936 MG/L (ref 0–19)
MONOCYTES NFR BLD: 0.45 K/UL (ref 0.1–0.95)
MONOCYTES NFR BLD: 6 % (ref 2–12)
NEUTROPHILS NFR BLD: 70 % (ref 43–80)
NEUTS SEG NFR BLD: 5 K/UL (ref 1.8–7.3)
NITRITE UR QL STRIP: NEGATIVE
PH UR STRIP: 7 [PH] (ref 5–9)
PHOSPHATE SERPL-MCNC: 3.7 MG/DL (ref 2.5–4.5)
PLATELET # BLD AUTO: 166 K/UL (ref 130–450)
PMV BLD AUTO: 12.3 FL (ref 7–12)
POTASSIUM SERPL-SCNC: 4.5 MMOL/L (ref 3.5–5)
PROT SERPL-MCNC: 6.6 G/DL (ref 6.4–8.3)
PROT UR STRIP-MCNC: >=300 MG/DL
PTH-INTACT SERPL-MCNC: 52.2 PG/ML (ref 15–65)
RBC # BLD AUTO: 3.94 M/UL (ref 3.8–5.8)
RBC #/AREA URNS HPF: ABNORMAL /HPF
SODIUM SERPL-SCNC: 141 MMOL/L (ref 132–146)
SP GR UR STRIP: 1.02 (ref 1–1.03)
TOTAL PROTEIN, URINE: 274 MG/DL (ref 0–12)
URINE TOTAL PROTEIN CREATININE RATIO: 2.28 (ref 0–0.2)
UROBILINOGEN UR STRIP-ACNC: 0.2 EU/DL (ref 0–1)
WBC #/AREA URNS HPF: ABNORMAL /HPF
WBC OTHER # BLD: 7.2 K/UL (ref 4.5–11.5)

## 2023-08-23 RX ORDER — CLONIDINE HYDROCHLORIDE 0.1 MG/1
TABLET ORAL
Qty: 60 TABLET | Refills: 0 | Status: SHIPPED | OUTPATIENT
Start: 2023-08-23

## 2023-08-23 NOTE — TELEPHONE ENCOUNTER
Patients last appointment 5/30/2023. Patients next scheduled appointment   Future Appointments   Date Time Provider [address]   11/27/2023  8:00 AM SCHEDULE, ERNESTO RUSH PC E. [facility]   11/30/2023  9:00 AM Johanne Baltimore, APRN - CNP E. [facility]   6/4/2024  9:00 AM Johanne Baltimore, APRN - CNP [facility]

## 2023-08-31 ENCOUNTER — HOSPITAL ENCOUNTER (OUTPATIENT)
Dept: HOSPITAL 83 - RESCLI | Age: 78
Discharge: HOME | End: 2023-08-31
Attending: INTERNAL MEDICINE
Payer: MEDICARE

## 2023-08-31 DIAGNOSIS — E11.9: Primary | ICD-10-CM

## 2023-08-31 DIAGNOSIS — E55.9: ICD-10-CM

## 2023-08-31 DIAGNOSIS — E78.5: ICD-10-CM

## 2023-08-31 DIAGNOSIS — I25.10: ICD-10-CM

## 2023-08-31 DIAGNOSIS — N40.0: ICD-10-CM

## 2023-08-31 DIAGNOSIS — Z95.5: ICD-10-CM

## 2023-08-31 DIAGNOSIS — J30.2: ICD-10-CM

## 2023-08-31 DIAGNOSIS — Z79.899: ICD-10-CM

## 2023-08-31 DIAGNOSIS — Z79.82: ICD-10-CM

## 2023-08-31 DIAGNOSIS — Z82.49: ICD-10-CM

## 2023-08-31 DIAGNOSIS — E03.9: ICD-10-CM

## 2023-08-31 DIAGNOSIS — Z00.00: ICD-10-CM

## 2023-08-31 DIAGNOSIS — I50.20: ICD-10-CM

## 2023-08-31 DIAGNOSIS — K21.9: ICD-10-CM

## 2023-08-31 DIAGNOSIS — I11.0: ICD-10-CM

## 2023-09-05 RX ORDER — OMEPRAZOLE 20 MG/1
CAPSULE, DELAYED RELEASE ORAL
Qty: 90 CAPSULE | Refills: 0 | Status: SHIPPED | OUTPATIENT
Start: 2023-09-05

## 2023-09-05 NOTE — TELEPHONE ENCOUNTER
Patients last appointment 5/30/2023. Patients next scheduled appointment   Future Appointments   Date Time Provider [address]   11/27/2023  8:00 AM SCHEDULE, ERNESTO RUSH PC E. [facility]   11/30/2023  9:00 AM VERENICE Salas CNP [facility]   6/4/2024  9:00 AM VERENICE Salas CNP [facility]

## 2023-09-09 NOTE — H&P
The H&P has been reviewed, the patient examined, and I concur with the findings of the H & P dated  9/9/2023. The risks, benefits, expected outcomes and alternatives to the recommended procedure have been discussed with pt and family and pt/family wish to proceed. A written consent sheet delineating INDICATIONS, ALTERNATIVES, ANESTHESIA COMPLICATIONS,SURGICAL COMPLICATIONS, AND GENERAL CONSIDERATIONS is present within our office chart and signed by the pt or their representative.
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
## 2023-09-11 NOTE — PROGRESS NOTES
Called for Dr Jewell Parr office notes - left voice message requesting most recent office eval records, EKG and Echo reports

## 2023-09-11 NOTE — PROGRESS NOTES
[address] [employer] PRE-ADMISSION TESTING INSTRUCTIONS    The Preadmission Testing patient is instructed accordingly using the following criteria (check applicable):    ARRIVAL INSTRUCTIONS:  [x] Parking the day of Surgery is located in the Main Entrance lot. Upon entering the door, make an immediate right to the surgery reception desk    [x] Bring photo ID and insurance card    [x] Bring in a copy of Living will or Durable Power of  papers. [x] Please be sure to arrange for responsible adult to provide transportation to and from the hospital    [x] Please arrange for responsible adult to be with you for the 24 hour period post procedure due to having anesthesia    [x] If you awake am of surgery not feeling well or have temperature >100 please call [phone]    GENERAL INSTRUCTIONS:    [x] Nothing by mouth after midnight, including gum, candy, mints or water    [x] You may brush your teeth, but do not swallow any water    [x] Take medications as instructed with 1-2 oz of water    [x] Stop herbal supplements and vitamins  prior to procedure    [x] Follow preop dosing of blood thinners per physician instructions    [] Take 1/2 dose of evening insulin, but no insulin after midnight    [x] No oral diabetic medications after midnight    [x] If diabetic and have low blood sugar or feel symptomatic, take 1-2oz apple juice only    [] Bring inhalers day of surgery    [] Bring C-PAP/ Bi-Pap day of surgery    [] Bring urine specimen day of surgery    [x] Shower or bath with soap, lather and rinse well, AM of Surgery, no lotion, powders or creams to surgical site    [] Follow bowel prep as instructed per surgeon    [x] No tobacco products within 24 hours of surgery     [x] No alcohol or illegal drug use within 24 hours of surgery.     [x] Jewelry, body piercing's, eyeglasses, contact lenses and dentures are not permitted into surgery (bring cases)      [x] Please do not wear any nail polish, make

## 2023-09-13 ENCOUNTER — ANESTHESIA (OUTPATIENT)
Dept: OPERATING ROOM | Age: 78
End: 2023-09-13
Payer: MEDICARE

## 2023-09-13 ENCOUNTER — ANESTHESIA EVENT (OUTPATIENT)
Dept: OPERATING ROOM | Age: 78
End: 2023-09-13
Payer: MEDICARE

## 2023-09-13 ENCOUNTER — HOSPITAL ENCOUNTER (OUTPATIENT)
Age: 78
Setting detail: OBSERVATION
Discharge: HOME | End: 2023-09-13
Attending: OTOLARYNGOLOGY | Admitting: OTOLARYNGOLOGY
Payer: MEDICARE

## 2023-09-13 VITALS
RESPIRATION RATE: 16 BRPM | HEIGHT: 70 IN | HEART RATE: 58 BPM | OXYGEN SATURATION: 95 % | SYSTOLIC BLOOD PRESSURE: 165 MMHG | BODY MASS INDEX: 36.65 KG/M2 | TEMPERATURE: 96.2 F | DIASTOLIC BLOOD PRESSURE: 73 MMHG | WEIGHT: 256 LBS

## 2023-09-13 DIAGNOSIS — D37.030 NEOPLASM OF UNCERTAIN BEHAVIOR OF PAROTID GLAND: ICD-10-CM

## 2023-09-13 DIAGNOSIS — D49.0 PAROTID NEOPLASM: Primary | ICD-10-CM

## 2023-09-13 LAB
ANION GAP SERPL CALCULATED.3IONS-SCNC: 10 MMOL/L (ref 7–16)
BUN SERPL-MCNC: 18 MG/DL (ref 6–23)
CALCIUM SERPL-MCNC: 8.6 MG/DL (ref 8.6–10.2)
CHLORIDE SERPL-SCNC: 104 MMOL/L (ref 98–107)
CO2 SERPL-SCNC: 25 MMOL/L (ref 22–29)
CREAT SERPL-MCNC: 1.5 MG/DL (ref 0.7–1.2)
EKG ATRIAL RATE: 48 BPM
EKG P AXIS: 9 DEGREES
EKG P-R INTERVAL: 194 MS
EKG Q-T INTERVAL: 524 MS
EKG QRS DURATION: 100 MS
EKG QTC CALCULATION (BAZETT): 468 MS
EKG R AXIS: -19 DEGREES
EKG T AXIS: 101 DEGREES
EKG VENTRICULAR RATE: 48 BPM
ERYTHROCYTE [DISTWIDTH] IN BLOOD BY AUTOMATED COUNT: 12.5 % (ref 11.5–15)
GFR SERPL CREATININE-BSD FRML MDRD: 47 ML/MIN/1.73M2
GLUCOSE SERPL-MCNC: 114 MG/DL (ref 74–99)
HCT VFR BLD AUTO: 36.1 % (ref 37–54)
HGB BLD-MCNC: 11.8 G/DL (ref 12.5–16.5)
MCH RBC QN AUTO: 30.3 PG (ref 26–35)
MCHC RBC AUTO-ENTMCNC: 32.7 G/DL (ref 32–34.5)
MCV RBC AUTO: 92.6 FL (ref 80–99.9)
PLATELET # BLD AUTO: 156 K/UL (ref 130–450)
PMV BLD AUTO: 11.1 FL (ref 7–12)
POTASSIUM SERPL-SCNC: 4.4 MMOL/L (ref 3.5–5)
RBC # BLD AUTO: 3.9 M/UL (ref 3.8–5.8)
SODIUM SERPL-SCNC: 139 MMOL/L (ref 132–146)
WBC OTHER # BLD: 7.2 K/UL (ref 4.5–11.5)

## 2023-09-13 PROCEDURE — 6370000000 HC RX 637 (ALT 250 FOR IP): Performed by: OTOLARYNGOLOGY

## 2023-09-13 PROCEDURE — 3600000013 HC SURGERY LEVEL 3 ADDTL 15MIN: Performed by: OTOLARYNGOLOGY

## 2023-09-13 PROCEDURE — 2500000003 HC RX 250 WO HCPCS: Performed by: OTOLARYNGOLOGY

## 2023-09-13 PROCEDURE — 2720000010 HC SURG SUPPLY STERILE: Performed by: OTOLARYNGOLOGY

## 2023-09-13 PROCEDURE — 7100000000 HC PACU RECOVERY - FIRST 15 MIN: Performed by: OTOLARYNGOLOGY

## 2023-09-13 PROCEDURE — 3600000003 HC SURGERY LEVEL 3 BASE: Performed by: OTOLARYNGOLOGY

## 2023-09-13 PROCEDURE — 80048 BASIC METABOLIC PNL TOTAL CA: CPT

## 2023-09-13 PROCEDURE — 2709999900 HC NON-CHARGEABLE SUPPLY: Performed by: OTOLARYNGOLOGY

## 2023-09-13 PROCEDURE — 7100000010 HC PHASE II RECOVERY - FIRST 15 MIN: Performed by: OTOLARYNGOLOGY

## 2023-09-13 PROCEDURE — 3700000000 HC ANESTHESIA ATTENDED CARE: Performed by: OTOLARYNGOLOGY

## 2023-09-13 PROCEDURE — 6360000002 HC RX W HCPCS: Performed by: OTOLARYNGOLOGY

## 2023-09-13 PROCEDURE — 7100000001 HC PACU RECOVERY - ADDTL 15 MIN: Performed by: OTOLARYNGOLOGY

## 2023-09-13 PROCEDURE — 2580000003 HC RX 258: Performed by: NURSE ANESTHETIST, CERTIFIED REGISTERED

## 2023-09-13 PROCEDURE — 85027 COMPLETE CBC AUTOMATED: CPT

## 2023-09-13 PROCEDURE — 2580000003 HC RX 258: Performed by: OTOLARYNGOLOGY

## 2023-09-13 PROCEDURE — 7100000011 HC PHASE II RECOVERY - ADDTL 15 MIN: Performed by: OTOLARYNGOLOGY

## 2023-09-13 PROCEDURE — 2500000003 HC RX 250 WO HCPCS: Performed by: NURSE ANESTHETIST, CERTIFIED REGISTERED

## 2023-09-13 PROCEDURE — 3700000001 HC ADD 15 MINUTES (ANESTHESIA): Performed by: OTOLARYNGOLOGY

## 2023-09-13 PROCEDURE — 88307 TISSUE EXAM BY PATHOLOGIST: CPT

## 2023-09-13 PROCEDURE — 6360000002 HC RX W HCPCS: Performed by: NURSE ANESTHETIST, CERTIFIED REGISTERED

## 2023-09-13 RX ORDER — CEFDINIR 300 MG/1
300 CAPSULE ORAL 2 TIMES DAILY
Qty: 14 CAPSULE | Refills: 0 | Status: SHIPPED | OUTPATIENT
Start: 2023-09-13 | End: 2023-09-20

## 2023-09-13 RX ORDER — SODIUM CHLORIDE 0.9 % (FLUSH) 0.9 %
5-40 SYRINGE (ML) INJECTION EVERY 12 HOURS SCHEDULED
Status: CANCELLED | OUTPATIENT
Start: 2023-09-13

## 2023-09-13 RX ORDER — SODIUM CHLORIDE 9 MG/ML
INJECTION, SOLUTION INTRAVENOUS PRN
Status: CANCELLED | OUTPATIENT
Start: 2023-09-13

## 2023-09-13 RX ORDER — LIDOCAINE HYDROCHLORIDE AND EPINEPHRINE 10; 10 MG/ML; UG/ML
INJECTION, SOLUTION INFILTRATION; PERINEURAL PRN
Status: DISCONTINUED | OUTPATIENT
Start: 2023-09-13 | End: 2023-09-13 | Stop reason: ALTCHOICE

## 2023-09-13 RX ORDER — PHENYLEPHRINE HCL IN 0.9% NACL 1 MG/10 ML
SYRINGE (ML) INTRAVENOUS PRN
Status: DISCONTINUED | OUTPATIENT
Start: 2023-09-13 | End: 2023-09-13 | Stop reason: SDUPTHER

## 2023-09-13 RX ORDER — SODIUM CHLORIDE, SODIUM LACTATE, POTASSIUM CHLORIDE, CALCIUM CHLORIDE 600; 310; 30; 20 MG/100ML; MG/100ML; MG/100ML; MG/100ML
INJECTION, SOLUTION INTRAVENOUS CONTINUOUS
Status: DISCONTINUED | OUTPATIENT
Start: 2023-09-13 | End: 2023-09-13 | Stop reason: HOSPADM

## 2023-09-13 RX ORDER — SODIUM CHLORIDE 0.9 % (FLUSH) 0.9 %
5-40 SYRINGE (ML) INJECTION PRN
Status: CANCELLED | OUTPATIENT
Start: 2023-09-13

## 2023-09-13 RX ORDER — VASOPRESSIN 20 U/ML
INJECTION PARENTERAL PRN
Status: DISCONTINUED | OUTPATIENT
Start: 2023-09-13 | End: 2023-09-13 | Stop reason: SDUPTHER

## 2023-09-13 RX ORDER — PROCHLORPERAZINE EDISYLATE 5 MG/ML
5 INJECTION INTRAMUSCULAR; INTRAVENOUS
Status: CANCELLED | OUTPATIENT
Start: 2023-09-13 | End: 2023-09-14

## 2023-09-13 RX ORDER — HYDROCODONE BITARTRATE AND ACETAMINOPHEN 5; 325 MG/1; MG/1
1 TABLET ORAL EVERY 6 HOURS PRN
Qty: 20 TABLET | Refills: 0 | Status: SHIPPED | OUTPATIENT
Start: 2023-09-13 | End: 2023-09-18

## 2023-09-13 RX ORDER — SODIUM CHLORIDE 0.9 % (FLUSH) 0.9 %
5-40 SYRINGE (ML) INJECTION PRN
Status: DISCONTINUED | OUTPATIENT
Start: 2023-09-13 | End: 2023-09-13 | Stop reason: HOSPADM

## 2023-09-13 RX ORDER — ONDANSETRON 2 MG/ML
INJECTION INTRAMUSCULAR; INTRAVENOUS PRN
Status: DISCONTINUED | OUTPATIENT
Start: 2023-09-13 | End: 2023-09-13 | Stop reason: SDUPTHER

## 2023-09-13 RX ORDER — SODIUM CHLORIDE 9 MG/ML
INJECTION, SOLUTION INTRAVENOUS CONTINUOUS PRN
Status: DISCONTINUED | OUTPATIENT
Start: 2023-09-13 | End: 2023-09-13 | Stop reason: SDUPTHER

## 2023-09-13 RX ORDER — SODIUM CHLORIDE 0.9 % (FLUSH) 0.9 %
5-40 SYRINGE (ML) INJECTION EVERY 12 HOURS SCHEDULED
Status: DISCONTINUED | OUTPATIENT
Start: 2023-09-13 | End: 2023-09-13 | Stop reason: HOSPADM

## 2023-09-13 RX ORDER — PROPOFOL 10 MG/ML
INJECTION, EMULSION INTRAVENOUS PRN
Status: DISCONTINUED | OUTPATIENT
Start: 2023-09-13 | End: 2023-09-13 | Stop reason: SDUPTHER

## 2023-09-13 RX ORDER — LIDOCAINE HYDROCHLORIDE 20 MG/ML
INJECTION, SOLUTION EPIDURAL; INFILTRATION; INTRACAUDAL; PERINEURAL PRN
Status: DISCONTINUED | OUTPATIENT
Start: 2023-09-13 | End: 2023-09-13 | Stop reason: SDUPTHER

## 2023-09-13 RX ORDER — ONDANSETRON 4 MG/1
4 TABLET, FILM COATED ORAL EVERY 8 HOURS PRN
Qty: 6 TABLET | Refills: 0 | Status: SHIPPED | OUTPATIENT
Start: 2023-09-13

## 2023-09-13 RX ORDER — ONDANSETRON 4 MG/1
4 TABLET, ORALLY DISINTEGRATING ORAL EVERY 8 HOURS PRN
Status: CANCELLED | OUTPATIENT
Start: 2023-09-13

## 2023-09-13 RX ORDER — DEXAMETHASONE SODIUM PHOSPHATE 4 MG/ML
INJECTION, SOLUTION INTRA-ARTICULAR; INTRALESIONAL; INTRAMUSCULAR; INTRAVENOUS; SOFT TISSUE PRN
Status: DISCONTINUED | OUTPATIENT
Start: 2023-09-13 | End: 2023-09-13 | Stop reason: SDUPTHER

## 2023-09-13 RX ORDER — HYDROCODONE BITARTRATE AND ACETAMINOPHEN 5; 325 MG/1; MG/1
1 TABLET ORAL EVERY 6 HOURS PRN
Status: CANCELLED | OUTPATIENT
Start: 2023-09-13

## 2023-09-13 RX ORDER — FENTANYL CITRATE 50 UG/ML
INJECTION, SOLUTION INTRAMUSCULAR; INTRAVENOUS PRN
Status: DISCONTINUED | OUTPATIENT
Start: 2023-09-13 | End: 2023-09-13 | Stop reason: SDUPTHER

## 2023-09-13 RX ORDER — MIDAZOLAM HYDROCHLORIDE 1 MG/ML
INJECTION INTRAMUSCULAR; INTRAVENOUS PRN
Status: DISCONTINUED | OUTPATIENT
Start: 2023-09-13 | End: 2023-09-13 | Stop reason: SDUPTHER

## 2023-09-13 RX ORDER — ROCURONIUM BROMIDE 10 MG/ML
INJECTION, SOLUTION INTRAVENOUS PRN
Status: DISCONTINUED | OUTPATIENT
Start: 2023-09-13 | End: 2023-09-13 | Stop reason: SDUPTHER

## 2023-09-13 RX ORDER — ACETAMINOPHEN 325 MG/1
650 TABLET ORAL EVERY 4 HOURS PRN
Status: CANCELLED | OUTPATIENT
Start: 2023-09-13

## 2023-09-13 RX ORDER — EPHEDRINE SULFATE/0.9% NACL/PF 50 MG/5 ML
SYRINGE (ML) INTRAVENOUS PRN
Status: DISCONTINUED | OUTPATIENT
Start: 2023-09-13 | End: 2023-09-13 | Stop reason: SDUPTHER

## 2023-09-13 RX ORDER — SUCCINYLCHOLINE/SOD CL,ISO/PF 200MG/10ML
SYRINGE (ML) INTRAVENOUS PRN
Status: DISCONTINUED | OUTPATIENT
Start: 2023-09-13 | End: 2023-09-13 | Stop reason: SDUPTHER

## 2023-09-13 RX ORDER — SODIUM CHLORIDE, SODIUM LACTATE, POTASSIUM CHLORIDE, CALCIUM CHLORIDE 600; 310; 30; 20 MG/100ML; MG/100ML; MG/100ML; MG/100ML
INJECTION, SOLUTION INTRAVENOUS CONTINUOUS
Status: CANCELLED | OUTPATIENT
Start: 2023-09-13

## 2023-09-13 RX ORDER — SODIUM CHLORIDE 9 MG/ML
INJECTION, SOLUTION INTRAVENOUS PRN
Status: DISCONTINUED | OUTPATIENT
Start: 2023-09-13 | End: 2023-09-13 | Stop reason: HOSPADM

## 2023-09-13 RX ORDER — ONDANSETRON 2 MG/ML
4 INJECTION INTRAMUSCULAR; INTRAVENOUS EVERY 6 HOURS PRN
Status: CANCELLED | OUTPATIENT
Start: 2023-09-13

## 2023-09-13 RX ADMIN — WATER 2000 MG: 1 INJECTION INTRAMUSCULAR; INTRAVENOUS; SUBCUTANEOUS at 08:48

## 2023-09-13 RX ADMIN — Medication 100 MCG: at 09:24

## 2023-09-13 RX ADMIN — PROPOFOL 150 MG: 10 INJECTION, EMULSION INTRAVENOUS at 08:57

## 2023-09-13 RX ADMIN — ONDANSETRON 4 MG: 2 INJECTION INTRAMUSCULAR; INTRAVENOUS at 09:50

## 2023-09-13 RX ADMIN — Medication 10 MG: at 09:15

## 2023-09-13 RX ADMIN — DEXAMETHASONE SODIUM PHOSPHATE 10 MG: 4 INJECTION, SOLUTION INTRAMUSCULAR; INTRAVENOUS at 09:06

## 2023-09-13 RX ADMIN — ROCURONIUM BROMIDE 10 MG: 10 INJECTION, SOLUTION INTRAVENOUS at 08:57

## 2023-09-13 RX ADMIN — Medication 100 MCG: at 09:23

## 2023-09-13 RX ADMIN — SODIUM CHLORIDE: 9 INJECTION, SOLUTION INTRAVENOUS at 08:45

## 2023-09-13 RX ADMIN — VASOPRESSIN 2 UNITS: 20 INJECTION INTRAVENOUS at 09:33

## 2023-09-13 RX ADMIN — Medication 10 MG: at 09:30

## 2023-09-13 RX ADMIN — Medication 140 MG: at 08:57

## 2023-09-13 RX ADMIN — FENTANYL CITRATE 50 MCG: 50 INJECTION, SOLUTION INTRAMUSCULAR; INTRAVENOUS at 08:57

## 2023-09-13 RX ADMIN — Medication 10 MG: at 09:13

## 2023-09-13 RX ADMIN — MIDAZOLAM 1 MG: 1 INJECTION INTRAMUSCULAR; INTRAVENOUS at 08:47

## 2023-09-13 RX ADMIN — MIDAZOLAM 1 MG: 1 INJECTION INTRAMUSCULAR; INTRAVENOUS at 09:32

## 2023-09-13 RX ADMIN — LIDOCAINE HYDROCHLORIDE 100 MG: 20 INJECTION, SOLUTION EPIDURAL; INFILTRATION; INTRACAUDAL; PERINEURAL at 08:57

## 2023-09-13 RX ADMIN — Medication 150 MCG: at 09:30

## 2023-09-13 RX ADMIN — SODIUM CHLORIDE: 9 INJECTION, SOLUTION INTRAVENOUS at 10:07

## 2023-09-13 RX ADMIN — Medication 10 MG: at 09:21

## 2023-09-13 ASSESSMENT — PAIN SCALES - GENERAL
PAINLEVEL_OUTOF10: 0
PAINLEVEL_OUTOF10: 0

## 2023-09-13 ASSESSMENT — PAIN - FUNCTIONAL ASSESSMENT: PAIN_FUNCTIONAL_ASSESSMENT: NONE - DENIES PAIN

## 2023-09-13 ASSESSMENT — LIFESTYLE VARIABLES: SMOKING_STATUS: 0

## 2023-09-13 NOTE — ANESTHESIA POSTPROCEDURE EVALUATION
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 [D37.030])    Surgeons: Elizabeth Otero MD Responsible Provider: Blanca Quezada MD    Anesthesia Type: General ASA Status: 3          Anesthesia Type: General    Hector Phase I: Hector Score: 10    Hector Phase II: Hector Score: 10      Anesthesia Post Evaluation    Patient location during evaluation: PACU  Patient participation: complete - patient participated  Level of consciousness: awake and alert  Pain score: 3  Airway patency: patent  Nausea & Vomiting: no vomiting and no nausea  Complications: no  Cardiovascular status: hemodynamically stable  Respiratory status: spontaneous ventilation, nonlabored ventilation and acceptable  Hydration status: stable  Pain management: adequate and satisfactory to patient

## 2023-09-13 NOTE — DISCHARGE INSTRUCTIONS
Empty drain bulb every 8 hours. Be sure to compress drain bulb after emptying. Drain will be removed in the office the day after surgery. DATE: Thursday, Sept 14                                   TIME: 8:30 am  [place] office      1. Ice to wound for 24 hours     2. Head elevated for 24 - 48 hours -     3. Keep wound and drain site dry for 24 hours after drain removed. 4. Apply antibiotic ointment (Bacitracin, bactroban or Neosporin Ointment) to incision and drain site every 8 hours until sutures removed. Ointment will be given to family post-op or prescription written. 5. Apply light dressing to drain site and change every 8 hours until fluid via drain site stops. Okay to leave dressing off at this time. Usually stops within the first day after drain removed. 6. Sutures are usually removed about 7 days post-op unless otherwise advised.
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
## 2023-09-13 NOTE — PROGRESS NOTES
Went over discharge instructions with patient and wife. Both verbalized understanding of instructions. Went over instructions on how to empty the DANO drain and to compress the drain.

## 2023-09-13 NOTE — BRIEF OP NOTE
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]    Post-Op Diagnosis: Same       Procedure(s):  RIGHT PAROTIDECTOMY WITH NERVE INTEGRITY MONITOR    Surgeon(s):  Christal White MD    Assistant:  Resident: Nancy Monzon DO; Yves Wood DO    Anesthesia: General    Estimated Blood Loss (mL): Minimal    Complications: None    Specimens:   ID Type Source Tests Collected by Time Destination   A : RIGHT PAROTID MASS Tissue Tissue SURGICAL PATHOLOGY Christal White MD 9/13/2023 0935        Implants:  * No implants in log *      Drains:   Closed/Suction Drain Right Neck Bulb (Active)       Findings: right parotid mass      Electronically signed by Christal White MD on 9/13/2023 at 9:45 AM

## 2023-09-13 NOTE — PROGRESS NOTES
CLINICAL PHARMACY NOTE: MEDS TO BEDS    Total # of Prescriptions Filled: 3   The following medications were delivered to the patient:  Ondansetron 4 mg  Cefdinir 300 mg  Norco 5-325 mg    Additional Documentation:

## 2023-09-13 NOTE — ANESTHESIA PRE PROCEDURE
Department of Anesthesiology  Preprocedure Note       Name:  Noel Martinez   Age:  77 y.o.  :  1945                                          MRN:  [id number]         Date:  2023      Surgeon: Mimi Victoria):  Deyanira Stinson MD    Procedure: Procedure(s):  RIGHT PAROTIDECTOMY WITH NERVE INTEGRITY MONITOR    Medications prior to admission:   Prior to Admission medications    Medication Sig Start Date End Date Taking?  Authorizing Provider   cloNIDine (CATAPRES) 0.1 MG tablet TAKE 1 TABLET BY MOUTH ONCE DAILY AT BEDTIME AS NEEDED  Patient not taking: Reported on 2023   [facility], APRN - CNP   omeprazole (PRILOSEC) 20 MG delayed release capsule Take 1 capsule by mouth once daily  Patient taking differently: Take 1 capsule by mouth every morning 23   [facility], APRN - CNP   carvedilol (COREG) 25 MG tablet TAKE 1 TABLET BY MOUTH TWICE DAILY WITH MEALS 8/15/23   [facility], APRN - CNP   levothyroxine (SYNTHROID) 50 MCG tablet Take 1 tablet by mouth once daily 23   [facility], APRN - CNP   rosuvastatin (CRESTOR) 40 MG tablet Take 1 tablet by mouth every evening 23  Michelle Sharma, APRN - CNP   ranolazine (RANEXA) 1000 MG extended release tablet TAKE 1  BY MOUTH TWICE DAILY 23   [facility], APRN - CNP   amLODIPine (NORVASC) 5 MG tablet Take 1 tablet by mouth daily  Patient not taking: Reported on 2023   Historical Provider, MD   metroNIDAZOLE (METROCREAM) 0.75 % cream APPLY TO FACE ONCE DAILY AT BEDTIME 23   Historical Provider, MD   ENTRESTO 49-51 MG per tablet Take 1 tablet by mouth 2 times daily 23   Historical Provider, MD   blood glucose test strips (ONETOUCH ULTRA) strip 1 each by Does not apply route 2 times daily 1/3/23 5/30/23  [facility], APRN - [place]   Insulin Degludec (TRESIBA FLEXTOUCH) 200 UNIT/ML SOPN Inject 55 Units into the skin daily (with breakfast) 1/3/23   [facility], APRN - [place]EN   isosorbide

## 2023-09-15 LAB — SURGICAL PATHOLOGY REPORT: NORMAL

## 2023-09-28 ENCOUNTER — APPOINTMENT (OUTPATIENT)
Dept: CT IMAGING | Age: 78
DRG: 392 | End: 2023-09-28
Attending: STUDENT IN AN ORGANIZED HEALTH CARE EDUCATION/TRAINING PROGRAM
Payer: MEDICARE

## 2023-09-28 ENCOUNTER — APPOINTMENT (OUTPATIENT)
Dept: CT IMAGING | Age: 78
DRG: 392 | End: 2023-09-28
Payer: MEDICARE

## 2023-09-28 ENCOUNTER — APPOINTMENT (OUTPATIENT)
Dept: GENERAL RADIOLOGY | Age: 78
DRG: 392 | End: 2023-09-28
Payer: MEDICARE

## 2023-09-28 ENCOUNTER — HOSPITAL ENCOUNTER (INPATIENT)
Age: 78
LOS: 6 days | Discharge: HOME | DRG: 392 | End: 2023-10-04
Attending: STUDENT IN AN ORGANIZED HEALTH CARE EDUCATION/TRAINING PROGRAM | Admitting: FAMILY MEDICINE
Payer: MEDICARE

## 2023-09-28 DIAGNOSIS — N39.0 ACUTE UTI: ICD-10-CM

## 2023-09-28 DIAGNOSIS — K57.32 DIVERTICULITIS OF COLON: Primary | ICD-10-CM

## 2023-09-28 DIAGNOSIS — N17.9 AKI (ACUTE KIDNEY INJURY) (HCC): ICD-10-CM

## 2023-09-28 DIAGNOSIS — E86.0 DEHYDRATION: ICD-10-CM

## 2023-09-28 PROBLEM — K57.92 DIVERTICULITIS: Status: ACTIVE | Noted: 2023-09-28

## 2023-09-28 LAB
ALBUMIN SERPL-MCNC: 3.6 G/DL (ref 3.5–5.2)
ALP SERPL-CCNC: 104 U/L (ref 40–129)
ALT SERPL-CCNC: 20 U/L (ref 0–40)
ANION GAP SERPL CALCULATED.3IONS-SCNC: 14 MMOL/L (ref 7–16)
AST SERPL-CCNC: 23 U/L (ref 0–39)
BACTERIA URNS QL MICRO: ABNORMAL
BASOPHILS # BLD: 0.03 K/UL (ref 0–0.2)
BASOPHILS NFR BLD: 0 % (ref 0–2)
BILIRUB SERPL-MCNC: 0.5 MG/DL (ref 0–1.2)
BILIRUB UR QL STRIP: ABNORMAL
BUN SERPL-MCNC: 73 MG/DL (ref 6–23)
CALCIUM SERPL-MCNC: 8.7 MG/DL (ref 8.6–10.2)
CHLORIDE SERPL-SCNC: 103 MMOL/L (ref 98–107)
CLARITY UR: CLEAR
CO2 SERPL-SCNC: 15 MMOL/L (ref 22–29)
COLOR UR: YELLOW
CREAT SERPL-MCNC: 3.6 MG/DL (ref 0.7–1.2)
EOSINOPHIL # BLD: 0.11 K/UL (ref 0.05–0.5)
EOSINOPHILS RELATIVE PERCENT: 1 % (ref 0–6)
EPI CELLS #/AREA URNS HPF: ABNORMAL /HPF
ERYTHROCYTE [DISTWIDTH] IN BLOOD BY AUTOMATED COUNT: 12.4 % (ref 11.5–15)
GFR SERPL CREATININE-BSD FRML MDRD: 16 ML/MIN/1.73M2
GLUCOSE SERPL-MCNC: 98 MG/DL (ref 74–99)
GLUCOSE UR STRIP-MCNC: NEGATIVE MG/DL
HCT VFR BLD AUTO: 42.7 % (ref 37–54)
HGB BLD-MCNC: 14.1 G/DL (ref 12.5–16.5)
HGB UR QL STRIP.AUTO: NEGATIVE
IMM GRANULOCYTES # BLD AUTO: 0.04 K/UL (ref 0–0.58)
IMM GRANULOCYTES NFR BLD: 0 % (ref 0–5)
KETONES UR STRIP-MCNC: ABNORMAL MG/DL
LACTATE BLDV-SCNC: 0.9 MMOL/L (ref 0.5–2.2)
LEUKOCYTE ESTERASE UR QL STRIP: ABNORMAL
LYMPHOCYTES NFR BLD: 1.51 K/UL (ref 1.5–4)
LYMPHOCYTES RELATIVE PERCENT: 14 % (ref 20–42)
MCH RBC QN AUTO: 29.9 PG (ref 26–35)
MCHC RBC AUTO-ENTMCNC: 33 G/DL (ref 32–34.5)
MCV RBC AUTO: 90.5 FL (ref 80–99.9)
MONOCYTES NFR BLD: 0.55 K/UL (ref 0.1–0.95)
MONOCYTES NFR BLD: 5 % (ref 2–12)
NEUTROPHILS NFR BLD: 80 % (ref 43–80)
NEUTS SEG NFR BLD: 8.78 K/UL (ref 1.8–7.3)
NITRITE UR QL STRIP: POSITIVE
PH UR STRIP: 5 [PH] (ref 5–9)
PLATELET # BLD AUTO: 212 K/UL (ref 130–450)
PMV BLD AUTO: 11.9 FL (ref 7–12)
POTASSIUM SERPL-SCNC: 4.2 MMOL/L (ref 3.5–5)
PROT SERPL-MCNC: 7.3 G/DL (ref 6.4–8.3)
PROT UR STRIP-MCNC: >=300 MG/DL
RBC # BLD AUTO: 4.72 M/UL (ref 3.8–5.8)
RBC #/AREA URNS HPF: ABNORMAL /HPF
SODIUM SERPL-SCNC: 132 MMOL/L (ref 132–146)
SP GR UR STRIP: >1.03 (ref 1–1.03)
TROPONIN I SERPL HS-MCNC: 36 NG/L (ref 0–11)
TROPONIN I SERPL HS-MCNC: 37 NG/L (ref 0–11)
UROBILINOGEN UR STRIP-ACNC: 1 EU/DL (ref 0–1)
WBC #/AREA URNS HPF: ABNORMAL /HPF
WBC OTHER # BLD: 11 K/UL (ref 4.5–11.5)

## 2023-09-28 PROCEDURE — 81001 URINALYSIS AUTO W/SCOPE: CPT

## 2023-09-28 PROCEDURE — 99285 EMERGENCY DEPT VISIT HI MDM: CPT

## 2023-09-28 PROCEDURE — 72125 CT NECK SPINE W/O DYE: CPT

## 2023-09-28 PROCEDURE — 93005 ELECTROCARDIOGRAM TRACING: CPT | Performed by: STUDENT IN AN ORGANIZED HEALTH CARE EDUCATION/TRAINING PROGRAM

## 2023-09-28 PROCEDURE — 96374 THER/PROPH/DIAG INJ IV PUSH: CPT

## 2023-09-28 PROCEDURE — 2060000000 HC ICU INTERMEDIATE R&B

## 2023-09-28 PROCEDURE — 87040 BLOOD CULTURE FOR BACTERIA: CPT

## 2023-09-28 PROCEDURE — 70450 CT HEAD/BRAIN W/O DYE: CPT

## 2023-09-28 PROCEDURE — 71250 CT THORAX DX C-: CPT

## 2023-09-28 PROCEDURE — 2580000003 HC RX 258: Performed by: STUDENT IN AN ORGANIZED HEALTH CARE EDUCATION/TRAINING PROGRAM

## 2023-09-28 PROCEDURE — 6360000002 HC RX W HCPCS: Performed by: STUDENT IN AN ORGANIZED HEALTH CARE EDUCATION/TRAINING PROGRAM

## 2023-09-28 PROCEDURE — 74176 CT ABD & PELVIS W/O CONTRAST: CPT

## 2023-09-28 PROCEDURE — 83605 ASSAY OF LACTIC ACID: CPT

## 2023-09-28 PROCEDURE — 84484 ASSAY OF TROPONIN QUANT: CPT

## 2023-09-28 PROCEDURE — 71045 X-RAY EXAM CHEST 1 VIEW: CPT

## 2023-09-28 PROCEDURE — 85025 COMPLETE CBC W/AUTO DIFF WBC: CPT

## 2023-09-28 PROCEDURE — 80053 COMPREHEN METABOLIC PANEL: CPT

## 2023-09-28 PROCEDURE — 96361 HYDRATE IV INFUSION ADD-ON: CPT

## 2023-09-28 RX ORDER — 0.9 % SODIUM CHLORIDE 0.9 %
1000 INTRAVENOUS SOLUTION INTRAVENOUS ONCE
Status: COMPLETED | OUTPATIENT
Start: 2023-09-28 | End: 2023-09-28

## 2023-09-28 RX ORDER — METRONIDAZOLE 500 MG/100ML
500 INJECTION, SOLUTION INTRAVENOUS ONCE
Status: COMPLETED | OUTPATIENT
Start: 2023-09-28 | End: 2023-09-29

## 2023-09-28 RX ADMIN — SODIUM CHLORIDE 1000 ML: 9 INJECTION, SOLUTION INTRAVENOUS at 19:32

## 2023-09-28 RX ADMIN — METRONIDAZOLE 500 MG: 500 INJECTION, SOLUTION INTRAVENOUS at 22:44

## 2023-09-28 RX ADMIN — SODIUM CHLORIDE 1000 ML: 9 INJECTION, SOLUTION INTRAVENOUS at 19:30

## 2023-09-28 RX ADMIN — WATER 2000 MG: 1 INJECTION INTRAMUSCULAR; INTRAVENOUS; SUBCUTANEOUS at 20:03

## 2023-09-28 ASSESSMENT — ENCOUNTER SYMPTOMS
BACK PAIN: 0
COUGH: 0
DIARRHEA: 0
EYE DISCHARGE: 0
EYE REDNESS: 0
SINUS PRESSURE: 0
VOMITING: 1
WHEEZING: 0
NAUSEA: 1
ABDOMINAL PAIN: 1
EYE PAIN: 0
SHORTNESS OF BREATH: 0
SORE THROAT: 0

## 2023-09-28 NOTE — ED PROVIDER NOTES
HPI   This is a 76-year-old male patient presents emergency department for evaluation of low blood pressure. Blood pressures have been low over the last 4 days. At home they report it was 74/45. He has been having some associated cough, nausea, vomiting, abdominal pain. No known fevers. He denies any chest pain. Denies any black or bloody stools. Review of Systems   Constitutional:  Negative for chills and fever. HENT:  Negative for ear pain, sinus pressure and sore throat. Eyes:  Negative for pain, discharge and redness. Respiratory:  Negative for cough, shortness of breath and wheezing. Cardiovascular:  Negative for chest pain. Gastrointestinal:  Positive for abdominal pain, nausea and vomiting. Negative for diarrhea. Genitourinary:  Negative for dysuria and frequency. Musculoskeletal:  Negative for arthralgias and back pain. Skin:  Negative for rash and wound. Neurological:  Negative for weakness and headaches. Hematological:  Negative for adenopathy. All other systems reviewed and are negative. Physical Exam  Vitals and nursing note reviewed. Constitutional:       Appearance: He is well-developed. HENT:      Head: Normocephalic and atraumatic. Eyes:      Conjunctiva/sclera: Conjunctivae normal.   Cardiovascular:      Rate and Rhythm: Normal rate and regular rhythm. Heart sounds: Normal heart sounds. No murmur heard. Pulmonary:      Effort: Pulmonary effort is normal. No respiratory distress. Breath sounds: Normal breath sounds. No wheezing or rales. Abdominal:      Tenderness: There is abdominal tenderness (Lower abdominal tenderness to palpation more so on the left side. ). There is no guarding or rebound. Musculoskeletal:         General: No tenderness or deformity. Cervical back: Normal range of motion and neck supple. Skin:     General: Skin is warm and dry.    Neurological:      Mental Status: He is alert and oriented to person, place, and

## 2023-09-28 NOTE — ED PROVIDER NOTES
Department of Emergency Medicine  FIRST PROVIDER TRIAGE NOTE             Independent MLP           9/28/23  4:16 PM EDT    Date of Encounter: 9/28/23   MRN: [id number]      HPI: Suze York is a 77 y.o. male who presents to the ED for Hypotension (Pt states his bp has been low since Sunday )  Blood pressure has been as low as 74/45. Has an upset stomach, infrequent cough, and neck pain. Thought he just had the flu. ROS: Negative for cp, sob, fever, vomiting, diarrhea, urinary complaints, or dizziness. PE: Gen Appearance/Constitutional: alert  HEENT: NC/NT. PERRLA,  Airway patent. CV: [email or web address]     Initial Plan of Care: All treatment areas with department are currently occupied.  Plan to order/Initiate the following while awaiting opening in ED:  Initiate Treatment-Testing, Proceed toTreatment Area When Bed Available for ED Attending/MLP to Continue Care    Electronically signed by VERENICE Reyna CNP   DD: 9/28/23       VERENICE Reyna CNP  09/28/23 4512

## 2023-09-29 LAB
ALBUMIN SERPL-MCNC: 2.8 G/DL (ref 3.5–5.2)
ALP SERPL-CCNC: 82 U/L (ref 40–129)
ALT SERPL-CCNC: 14 U/L (ref 0–40)
ANION GAP SERPL CALCULATED.3IONS-SCNC: 13 MMOL/L (ref 7–16)
AST SERPL-CCNC: 17 U/L (ref 0–39)
BASOPHILS # BLD: 0.04 K/UL (ref 0–0.2)
BASOPHILS NFR BLD: 0 % (ref 0–2)
BILIRUB SERPL-MCNC: 0.3 MG/DL (ref 0–1.2)
BUN SERPL-MCNC: 66 MG/DL (ref 6–23)
CALCIUM SERPL-MCNC: 7.5 MG/DL (ref 8.6–10.2)
CHLORIDE SERPL-SCNC: 109 MMOL/L (ref 98–107)
CO2 SERPL-SCNC: 10 MMOL/L (ref 22–29)
CREAT SERPL-MCNC: 2.9 MG/DL (ref 0.7–1.2)
EKG ATRIAL RATE: 59 BPM
EKG P AXIS: 55 DEGREES
EKG P-R INTERVAL: 176 MS
EKG Q-T INTERVAL: 460 MS
EKG QRS DURATION: 102 MS
EKG QTC CALCULATION (BAZETT): 455 MS
EKG R AXIS: -15 DEGREES
EKG T AXIS: 77 DEGREES
EKG VENTRICULAR RATE: 59 BPM
EOSINOPHIL # BLD: 0.15 K/UL (ref 0.05–0.5)
EOSINOPHILS RELATIVE PERCENT: 2 % (ref 0–6)
ERYTHROCYTE [DISTWIDTH] IN BLOOD BY AUTOMATED COUNT: 12.6 % (ref 11.5–15)
GFR SERPL CREATININE-BSD FRML MDRD: 21 ML/MIN/1.73M2
GLUCOSE BLD-MCNC: 86 MG/DL (ref 74–99)
GLUCOSE SERPL-MCNC: 72 MG/DL (ref 74–99)
HCT VFR BLD AUTO: 39.5 % (ref 37–54)
HGB BLD-MCNC: 12.4 G/DL (ref 12.5–16.5)
IMM GRANULOCYTES # BLD AUTO: <0.03 K/UL (ref 0–0.58)
IMM GRANULOCYTES NFR BLD: 0 % (ref 0–5)
LYMPHOCYTES NFR BLD: 1.97 K/UL (ref 1.5–4)
LYMPHOCYTES RELATIVE PERCENT: 21 % (ref 20–42)
MCH RBC QN AUTO: 30 PG (ref 26–35)
MCHC RBC AUTO-ENTMCNC: 31.4 G/DL (ref 32–34.5)
MCV RBC AUTO: 95.4 FL (ref 80–99.9)
MONOCYTES NFR BLD: 0.78 K/UL (ref 0.1–0.95)
MONOCYTES NFR BLD: 8 % (ref 2–12)
NEUTROPHILS NFR BLD: 68 % (ref 43–80)
NEUTS SEG NFR BLD: 6.37 K/UL (ref 1.8–7.3)
PLATELET, FLUORESCENCE: 128 K/UL (ref 130–450)
PMV BLD AUTO: 11.5 FL (ref 7–12)
POTASSIUM SERPL-SCNC: 3.6 MMOL/L (ref 3.5–5)
PROT SERPL-MCNC: 5.7 G/DL (ref 6.4–8.3)
RBC # BLD AUTO: 4.14 M/UL (ref 3.8–5.8)
SODIUM SERPL-SCNC: 132 MMOL/L (ref 132–146)
WBC OTHER # BLD: 9.3 K/UL (ref 4.5–11.5)

## 2023-09-29 PROCEDURE — 6360000002 HC RX W HCPCS: Performed by: FAMILY MEDICINE

## 2023-09-29 PROCEDURE — 2580000003 HC RX 258: Performed by: FAMILY MEDICINE

## 2023-09-29 PROCEDURE — 36415 COLL VENOUS BLD VENIPUNCTURE: CPT

## 2023-09-29 PROCEDURE — 2580000003 HC RX 258: Performed by: STUDENT IN AN ORGANIZED HEALTH CARE EDUCATION/TRAINING PROGRAM

## 2023-09-29 PROCEDURE — 6370000000 HC RX 637 (ALT 250 FOR IP): Performed by: STUDENT IN AN ORGANIZED HEALTH CARE EDUCATION/TRAINING PROGRAM

## 2023-09-29 PROCEDURE — 93010 ELECTROCARDIOGRAM REPORT: CPT | Performed by: INTERNAL MEDICINE

## 2023-09-29 PROCEDURE — 80053 COMPREHEN METABOLIC PANEL: CPT

## 2023-09-29 PROCEDURE — 85025 COMPLETE CBC W/AUTO DIFF WBC: CPT

## 2023-09-29 PROCEDURE — 82962 GLUCOSE BLOOD TEST: CPT

## 2023-09-29 PROCEDURE — 6370000000 HC RX 637 (ALT 250 FOR IP): Performed by: FAMILY MEDICINE

## 2023-09-29 PROCEDURE — 2060000000 HC ICU INTERMEDIATE R&B

## 2023-09-29 RX ORDER — PANTOPRAZOLE SODIUM 40 MG/1
40 TABLET, DELAYED RELEASE ORAL
Status: DISCONTINUED | OUTPATIENT
Start: 2023-09-29 | End: 2023-10-03

## 2023-09-29 RX ORDER — SODIUM CHLORIDE 0.9 % (FLUSH) 0.9 %
10 SYRINGE (ML) INJECTION EVERY 12 HOURS SCHEDULED
Status: DISCONTINUED | OUTPATIENT
Start: 2023-09-29 | End: 2023-10-04 | Stop reason: HOSPADM

## 2023-09-29 RX ORDER — ISOSORBIDE MONONITRATE 60 MG/1
30 TABLET, EXTENDED RELEASE ORAL 2 TIMES DAILY
COMMUNITY

## 2023-09-29 RX ORDER — LEVOTHYROXINE SODIUM 0.05 MG/1
50 TABLET ORAL DAILY
COMMUNITY

## 2023-09-29 RX ORDER — ASPIRIN 81 MG/1
81 TABLET ORAL DAILY
Status: DISCONTINUED | OUTPATIENT
Start: 2023-09-29 | End: 2023-10-04 | Stop reason: HOSPADM

## 2023-09-29 RX ORDER — SODIUM BICARBONATE 650 MG/1
650 TABLET ORAL DAILY
Status: DISCONTINUED | OUTPATIENT
Start: 2023-09-29 | End: 2023-10-02

## 2023-09-29 RX ORDER — ROSUVASTATIN CALCIUM 20 MG/1
40 TABLET, COATED ORAL EVERY EVENING
Status: DISCONTINUED | OUTPATIENT
Start: 2023-09-29 | End: 2023-10-04 | Stop reason: HOSPADM

## 2023-09-29 RX ORDER — LEVOTHYROXINE SODIUM 0.05 MG/1
50 TABLET ORAL DAILY
Status: DISCONTINUED | OUTPATIENT
Start: 2023-09-29 | End: 2023-10-04 | Stop reason: HOSPADM

## 2023-09-29 RX ORDER — SODIUM CHLORIDE 9 MG/ML
INJECTION, SOLUTION INTRAVENOUS CONTINUOUS
Status: DISCONTINUED | OUTPATIENT
Start: 2023-09-29 | End: 2023-10-02

## 2023-09-29 RX ORDER — ACETAMINOPHEN 325 MG/1
650 TABLET ORAL EVERY 6 HOURS PRN
Status: DISCONTINUED | OUTPATIENT
Start: 2023-09-29 | End: 2023-10-04 | Stop reason: HOSPADM

## 2023-09-29 RX ORDER — RANOLAZINE 500 MG/1
1000 TABLET, EXTENDED RELEASE ORAL 2 TIMES DAILY
Status: DISCONTINUED | OUTPATIENT
Start: 2023-09-29 | End: 2023-10-04 | Stop reason: HOSPADM

## 2023-09-29 RX ORDER — METRONIDAZOLE 500 MG/100ML
500 INJECTION, SOLUTION INTRAVENOUS EVERY 8 HOURS
Status: DISCONTINUED | OUTPATIENT
Start: 2023-09-29 | End: 2023-10-04

## 2023-09-29 RX ORDER — SODIUM CHLORIDE 0.9 % (FLUSH) 0.9 %
10 SYRINGE (ML) INJECTION PRN
Status: DISCONTINUED | OUTPATIENT
Start: 2023-09-29 | End: 2023-10-04 | Stop reason: HOSPADM

## 2023-09-29 RX ORDER — POLYETHYLENE GLYCOL 3350 17 G/17G
17 POWDER, FOR SOLUTION ORAL DAILY PRN
Status: DISCONTINUED | OUTPATIENT
Start: 2023-09-29 | End: 2023-10-04 | Stop reason: HOSPADM

## 2023-09-29 RX ORDER — RANOLAZINE 1000 MG/1
1000 TABLET, EXTENDED RELEASE ORAL 2 TIMES DAILY
COMMUNITY

## 2023-09-29 RX ORDER — OMEPRAZOLE 20 MG/1
20 CAPSULE, DELAYED RELEASE ORAL DAILY
Status: ON HOLD | COMMUNITY
End: 2023-10-04 | Stop reason: HOSPADM

## 2023-09-29 RX ORDER — SODIUM CHLORIDE 9 MG/ML
INJECTION, SOLUTION INTRAVENOUS PRN
Status: DISCONTINUED | OUTPATIENT
Start: 2023-09-29 | End: 2023-10-04 | Stop reason: HOSPADM

## 2023-09-29 RX ORDER — OMEPRAZOLE 20 MG/1
20 CAPSULE, DELAYED RELEASE ORAL EVERY MORNING
Status: DISCONTINUED | OUTPATIENT
Start: 2023-09-29 | End: 2023-09-29 | Stop reason: CLARIF

## 2023-09-29 RX ORDER — CARVEDILOL 25 MG/1
25 TABLET ORAL 2 TIMES DAILY WITH MEALS
COMMUNITY

## 2023-09-29 RX ORDER — ONDANSETRON 2 MG/ML
4 INJECTION INTRAMUSCULAR; INTRAVENOUS EVERY 6 HOURS PRN
Status: DISCONTINUED | OUTPATIENT
Start: 2023-09-29 | End: 2023-10-04 | Stop reason: HOSPADM

## 2023-09-29 RX ORDER — ISOSORBIDE MONONITRATE 30 MG/1
60 TABLET, EXTENDED RELEASE ORAL 2 TIMES DAILY
Status: DISCONTINUED | OUTPATIENT
Start: 2023-09-29 | End: 2023-10-04 | Stop reason: HOSPADM

## 2023-09-29 RX ORDER — ENOXAPARIN SODIUM 100 MG/ML
30 INJECTION SUBCUTANEOUS DAILY
Status: DISCONTINUED | OUTPATIENT
Start: 2023-09-29 | End: 2023-10-01 | Stop reason: DRUGHIGH

## 2023-09-29 RX ORDER — TAMSULOSIN HYDROCHLORIDE 0.4 MG/1
0.4 CAPSULE ORAL 2 TIMES DAILY
Status: DISCONTINUED | OUTPATIENT
Start: 2023-09-29 | End: 2023-10-04 | Stop reason: HOSPADM

## 2023-09-29 RX ORDER — ACETAMINOPHEN 650 MG/1
650 SUPPOSITORY RECTAL EVERY 6 HOURS PRN
Status: DISCONTINUED | OUTPATIENT
Start: 2023-09-29 | End: 2023-10-04 | Stop reason: HOSPADM

## 2023-09-29 RX ORDER — CARVEDILOL 25 MG/1
25 TABLET ORAL 2 TIMES DAILY WITH MEALS
Status: DISCONTINUED | OUTPATIENT
Start: 2023-09-29 | End: 2023-10-04 | Stop reason: HOSPADM

## 2023-09-29 RX ORDER — PROMETHAZINE HYDROCHLORIDE 12.5 MG/1
12.5 TABLET ORAL EVERY 6 HOURS PRN
Status: DISCONTINUED | OUTPATIENT
Start: 2023-09-29 | End: 2023-10-04 | Stop reason: HOSPADM

## 2023-09-29 RX ADMIN — METRONIDAZOLE 500 MG: 500 INJECTION, SOLUTION INTRAVENOUS at 15:52

## 2023-09-29 RX ADMIN — METRONIDAZOLE 500 MG: 500 INJECTION, SOLUTION INTRAVENOUS at 21:01

## 2023-09-29 RX ADMIN — SODIUM CHLORIDE: 9 INJECTION, SOLUTION INTRAVENOUS at 18:34

## 2023-09-29 RX ADMIN — TAMSULOSIN HYDROCHLORIDE 0.4 MG: 0.4 CAPSULE ORAL at 21:00

## 2023-09-29 RX ADMIN — METRONIDAZOLE 500 MG: 500 INJECTION, SOLUTION INTRAVENOUS at 11:36

## 2023-09-29 RX ADMIN — SODIUM CHLORIDE, PRESERVATIVE FREE 10 ML: 5 INJECTION INTRAVENOUS at 20:59

## 2023-09-29 RX ADMIN — ISOSORBIDE MONONITRATE 60 MG: 30 TABLET, EXTENDED RELEASE ORAL at 11:34

## 2023-09-29 RX ADMIN — WATER 1000 MG: 1 INJECTION INTRAMUSCULAR; INTRAVENOUS; SUBCUTANEOUS at 21:00

## 2023-09-29 RX ADMIN — CARVEDILOL 25 MG: 25 TABLET, FILM COATED ORAL at 11:34

## 2023-09-29 RX ADMIN — RANOLAZINE 1000 MG: 500 TABLET, EXTENDED RELEASE ORAL at 21:00

## 2023-09-29 RX ADMIN — ASPIRIN 81 MG: 81 TABLET, COATED ORAL at 11:34

## 2023-09-29 RX ADMIN — TAMSULOSIN HYDROCHLORIDE 0.4 MG: 0.4 CAPSULE ORAL at 11:37

## 2023-09-29 RX ADMIN — ISOSORBIDE MONONITRATE 60 MG: 30 TABLET, EXTENDED RELEASE ORAL at 21:00

## 2023-09-29 RX ADMIN — SODIUM CHLORIDE: 9 INJECTION, SOLUTION INTRAVENOUS at 03:24

## 2023-09-29 RX ADMIN — ROSUVASTATIN 40 MG: 20 TABLET, FILM COATED ORAL at 17:19

## 2023-09-29 ASSESSMENT — PAIN - FUNCTIONAL ASSESSMENT: PAIN_FUNCTIONAL_ASSESSMENT: NONE - DENIES PAIN

## 2023-09-29 NOTE — H&P
Hospitalist History & Physical      PCP: Vivi Camacho, APRN - CNP    Date of Service: Pt seen/examined on 9/28/2023     Chief Complaint:  had concerns including Hypotension (Pt states his bp has been low since Sunday ). History Of Present Illness:    Mr. Chantelle Mckay, a 77y.o. year old male  who  has a past medical history of CAD (coronary artery disease), Cancer ([address]), Diabetes mellitus ([address]), Diverticular disease of colon, DJD (degenerative joint disease), multiple sites, GERD (gastroesophageal reflux disease), Goiter, H/O thyroid nodule, History of heart attack, Hx of malignant neoplasm of prostate, Hyperlipidemia, Hypertension, Hypothyroidism, Ischemic cardiomyopathy, Parotid neoplasm, and Status post insertion of drug eluting coronary artery stent. Patient presented to the emergency department with concerns about low blood pressure. Patient been running low over the last 4 days. Also reporting some nausea, vomiting and abdominal pain. Vital signs on arrival showed patient be hypotensive with pressures as low as 72/36. After fluid resuscitation he is currently 124/44. The patient is afebrile. SPO2 97% on room air. Laboratory studies demonstrate BUN 73, creatinine 3.6, troponin 36 with repeat of 37. Urinalysis positive for infection. CT abdomen pelvis shows thickening of the mid sigmoid colon with adjacent inflammatory stranding consistent with diverticulitis. Patient was started on metronidazole and ceftriaxone and medicine was consulted for admission.       Past Medical History:   Diagnosis Date    CAD (coronary artery disease)     Cancer ([address])     skin    Diabetes mellitus ([address])     on insulin    Diverticular disease of colon     DJD (degenerative joint disease), multiple sites     GERD (gastroesophageal reflux disease)     Goiter     H/O thyroid nodule     History of heart attack 1987    Hx of malignant neoplasm of prostate     Hyperlipidemia     Hypertension     Hypothyroidism

## 2023-09-30 LAB
ANION GAP SERPL CALCULATED.3IONS-SCNC: 8 MMOL/L (ref 7–16)
BASOPHILS # BLD: 0.03 K/UL (ref 0–0.2)
BASOPHILS NFR BLD: 1 % (ref 0–2)
BUN SERPL-MCNC: 51 MG/DL (ref 6–23)
CALCIUM SERPL-MCNC: 8.3 MG/DL (ref 8.6–10.2)
CHLORIDE SERPL-SCNC: 115 MMOL/L (ref 98–107)
CHOLEST SERPL-MCNC: 124 MG/DL
CO2 SERPL-SCNC: 18 MMOL/L (ref 22–29)
CREAT SERPL-MCNC: 2.1 MG/DL (ref 0.7–1.2)
EOSINOPHIL # BLD: 0.2 K/UL (ref 0.05–0.5)
EOSINOPHILS RELATIVE PERCENT: 3 % (ref 0–6)
ERYTHROCYTE [DISTWIDTH] IN BLOOD BY AUTOMATED COUNT: 12.5 % (ref 11.5–15)
GFR SERPL CREATININE-BSD FRML MDRD: 32 ML/MIN/1.73M2
GLUCOSE BLD-MCNC: 104 MG/DL (ref 74–99)
GLUCOSE BLD-MCNC: 106 MG/DL (ref 74–99)
GLUCOSE SERPL-MCNC: 114 MG/DL (ref 74–99)
HBA1C MFR BLD: 5.9 % (ref 4–5.6)
HCT VFR BLD AUTO: 35.3 % (ref 37–54)
HDLC SERPL-MCNC: 26 MG/DL
HGB BLD-MCNC: 11.7 G/DL (ref 12.5–16.5)
IMM GRANULOCYTES # BLD AUTO: <0.03 K/UL (ref 0–0.58)
IMM GRANULOCYTES NFR BLD: 0 % (ref 0–5)
LDLC SERPL CALC-MCNC: 58 MG/DL
LYMPHOCYTES NFR BLD: 1.22 K/UL (ref 1.5–4)
LYMPHOCYTES RELATIVE PERCENT: 20 % (ref 20–42)
MCH RBC QN AUTO: 29.7 PG (ref 26–35)
MCHC RBC AUTO-ENTMCNC: 33.1 G/DL (ref 32–34.5)
MCV RBC AUTO: 89.6 FL (ref 80–99.9)
MONOCYTES NFR BLD: 0.43 K/UL (ref 0.1–0.95)
MONOCYTES NFR BLD: 7 % (ref 2–12)
NEUTROPHILS NFR BLD: 69 % (ref 43–80)
NEUTS SEG NFR BLD: 4.21 K/UL (ref 1.8–7.3)
PLATELET # BLD AUTO: 166 K/UL (ref 130–450)
PMV BLD AUTO: 11.6 FL (ref 7–12)
POTASSIUM SERPL-SCNC: 4.3 MMOL/L (ref 3.5–5)
RBC # BLD AUTO: 3.94 M/UL (ref 3.8–5.8)
SODIUM SERPL-SCNC: 141 MMOL/L (ref 132–146)
TRIGL SERPL-MCNC: 200 MG/DL
VLDLC SERPL CALC-MCNC: 40 MG/DL
WBC OTHER # BLD: 6.1 K/UL (ref 4.5–11.5)

## 2023-09-30 PROCEDURE — 6370000000 HC RX 637 (ALT 250 FOR IP): Performed by: FAMILY MEDICINE

## 2023-09-30 PROCEDURE — 83036 HEMOGLOBIN GLYCOSYLATED A1C: CPT

## 2023-09-30 PROCEDURE — 2580000003 HC RX 258: Performed by: FAMILY MEDICINE

## 2023-09-30 PROCEDURE — 85025 COMPLETE CBC W/AUTO DIFF WBC: CPT

## 2023-09-30 PROCEDURE — 2580000003 HC RX 258: Performed by: STUDENT IN AN ORGANIZED HEALTH CARE EDUCATION/TRAINING PROGRAM

## 2023-09-30 PROCEDURE — 87086 URINE CULTURE/COLONY COUNT: CPT

## 2023-09-30 PROCEDURE — 80061 LIPID PANEL: CPT

## 2023-09-30 PROCEDURE — 36415 COLL VENOUS BLD VENIPUNCTURE: CPT

## 2023-09-30 PROCEDURE — 6370000000 HC RX 637 (ALT 250 FOR IP): Performed by: STUDENT IN AN ORGANIZED HEALTH CARE EDUCATION/TRAINING PROGRAM

## 2023-09-30 PROCEDURE — 2060000000 HC ICU INTERMEDIATE R&B

## 2023-09-30 PROCEDURE — 80048 BASIC METABOLIC PNL TOTAL CA: CPT

## 2023-09-30 PROCEDURE — 82962 GLUCOSE BLOOD TEST: CPT

## 2023-09-30 PROCEDURE — 6360000002 HC RX W HCPCS: Performed by: FAMILY MEDICINE

## 2023-09-30 RX ORDER — SODIUM CHLORIDE, SODIUM LACTATE, POTASSIUM CHLORIDE, CALCIUM CHLORIDE 600; 310; 30; 20 MG/100ML; MG/100ML; MG/100ML; MG/100ML
INJECTION, SOLUTION INTRAVENOUS CONTINUOUS
Status: DISCONTINUED | OUTPATIENT
Start: 2023-09-30 | End: 2023-10-02

## 2023-09-30 RX ADMIN — TAMSULOSIN HYDROCHLORIDE 0.4 MG: 0.4 CAPSULE ORAL at 21:47

## 2023-09-30 RX ADMIN — LEVOTHYROXINE SODIUM 50 MCG: 0.05 TABLET ORAL at 06:15

## 2023-09-30 RX ADMIN — ASPIRIN 81 MG: 81 TABLET, COATED ORAL at 08:48

## 2023-09-30 RX ADMIN — TAMSULOSIN HYDROCHLORIDE 0.4 MG: 0.4 CAPSULE ORAL at 08:48

## 2023-09-30 RX ADMIN — ISOSORBIDE MONONITRATE 60 MG: 30 TABLET, EXTENDED RELEASE ORAL at 08:48

## 2023-09-30 RX ADMIN — RANOLAZINE 1000 MG: 500 TABLET, EXTENDED RELEASE ORAL at 21:47

## 2023-09-30 RX ADMIN — ISOSORBIDE MONONITRATE 60 MG: 30 TABLET, EXTENDED RELEASE ORAL at 21:47

## 2023-09-30 RX ADMIN — PANTOPRAZOLE SODIUM 40 MG: 40 TABLET, DELAYED RELEASE ORAL at 06:15

## 2023-09-30 RX ADMIN — SODIUM CHLORIDE, POTASSIUM CHLORIDE, SODIUM LACTATE AND CALCIUM CHLORIDE: 600; 310; 30; 20 INJECTION, SOLUTION INTRAVENOUS at 13:51

## 2023-09-30 RX ADMIN — RANOLAZINE 1000 MG: 500 TABLET, EXTENDED RELEASE ORAL at 08:48

## 2023-09-30 RX ADMIN — ROSUVASTATIN 40 MG: 20 TABLET, FILM COATED ORAL at 16:58

## 2023-09-30 RX ADMIN — METRONIDAZOLE 500 MG: 500 INJECTION, SOLUTION INTRAVENOUS at 21:44

## 2023-09-30 RX ADMIN — METRONIDAZOLE 500 MG: 500 INJECTION, SOLUTION INTRAVENOUS at 06:15

## 2023-09-30 RX ADMIN — SODIUM CHLORIDE: 9 INJECTION, SOLUTION INTRAVENOUS at 01:53

## 2023-09-30 RX ADMIN — CARVEDILOL 25 MG: 25 TABLET, FILM COATED ORAL at 16:58

## 2023-09-30 RX ADMIN — SODIUM CHLORIDE, PRESERVATIVE FREE 10 ML: 5 INJECTION INTRAVENOUS at 21:43

## 2023-09-30 RX ADMIN — SODIUM BICARBONATE 650 MG: 650 TABLET ORAL at 08:48

## 2023-09-30 RX ADMIN — METRONIDAZOLE 500 MG: 500 INJECTION, SOLUTION INTRAVENOUS at 15:50

## 2023-09-30 RX ADMIN — ENOXAPARIN SODIUM 30 MG: 100 INJECTION SUBCUTANEOUS at 08:48

## 2023-09-30 RX ADMIN — SODIUM CHLORIDE: 9 INJECTION, SOLUTION INTRAVENOUS at 10:50

## 2023-09-30 RX ADMIN — CARVEDILOL 25 MG: 25 TABLET, FILM COATED ORAL at 08:48

## 2023-09-30 RX ADMIN — WATER 1000 MG: 1 INJECTION INTRAMUSCULAR; INTRAVENOUS; SUBCUTANEOUS at 21:47

## 2023-10-01 LAB
ALBUMIN SERPL-MCNC: 3.4 G/DL (ref 3.5–5.2)
ALP SERPL-CCNC: 78 U/L (ref 40–129)
ALT SERPL-CCNC: 14 U/L (ref 0–40)
ANION GAP SERPL CALCULATED.3IONS-SCNC: 12 MMOL/L (ref 7–16)
ANION GAP SERPL CALCULATED.3IONS-SCNC: 9 MMOL/L (ref 7–16)
AST SERPL-CCNC: 16 U/L (ref 0–39)
BASOPHILS # BLD: 0.03 K/UL (ref 0–0.2)
BASOPHILS NFR BLD: 1 % (ref 0–2)
BILIRUB SERPL-MCNC: 0.2 MG/DL (ref 0–1.2)
BUN SERPL-MCNC: 28 MG/DL (ref 6–23)
BUN SERPL-MCNC: 35 MG/DL (ref 6–23)
CALCIUM SERPL-MCNC: 8.2 MG/DL (ref 8.6–10.2)
CALCIUM SERPL-MCNC: 8.2 MG/DL (ref 8.6–10.2)
CHLORIDE SERPL-SCNC: 111 MMOL/L (ref 98–107)
CHLORIDE SERPL-SCNC: 114 MMOL/L (ref 98–107)
CO2 SERPL-SCNC: 15 MMOL/L (ref 22–29)
CO2 SERPL-SCNC: 18 MMOL/L (ref 22–29)
CREAT SERPL-MCNC: 1.6 MG/DL (ref 0.7–1.2)
CREAT SERPL-MCNC: 1.7 MG/DL (ref 0.7–1.2)
CREAT UR-MCNC: 100.3 MG/DL (ref 40–278)
CREAT UR-MCNC: 102.4 MG/DL (ref 40–278)
EOSINOPHIL # BLD: 0.27 K/UL (ref 0.05–0.5)
EOSINOPHILS RELATIVE PERCENT: 5 % (ref 0–6)
ERYTHROCYTE [DISTWIDTH] IN BLOOD BY AUTOMATED COUNT: 12.5 % (ref 11.5–15)
GFR SERPL CREATININE-BSD FRML MDRD: 41 ML/MIN/1.73M2
GFR SERPL CREATININE-BSD FRML MDRD: 45 ML/MIN/1.73M2
GLUCOSE BLD-MCNC: 118 MG/DL (ref 74–99)
GLUCOSE BLD-MCNC: 88 MG/DL (ref 74–99)
GLUCOSE SERPL-MCNC: 109 MG/DL (ref 74–99)
GLUCOSE SERPL-MCNC: 90 MG/DL (ref 74–99)
HCT VFR BLD AUTO: 32.5 % (ref 37–54)
HGB BLD-MCNC: 11.2 G/DL (ref 12.5–16.5)
IMM GRANULOCYTES # BLD AUTO: <0.03 K/UL (ref 0–0.58)
IMM GRANULOCYTES NFR BLD: 0 % (ref 0–5)
LYMPHOCYTES NFR BLD: 1.38 K/UL (ref 1.5–4)
LYMPHOCYTES RELATIVE PERCENT: 24 % (ref 20–42)
MCH RBC QN AUTO: 30.6 PG (ref 26–35)
MCHC RBC AUTO-ENTMCNC: 34.5 G/DL (ref 32–34.5)
MCV RBC AUTO: 88.8 FL (ref 80–99.9)
MICROORGANISM SPEC CULT: NO GROWTH
MONOCYTES NFR BLD: 0.5 K/UL (ref 0.1–0.95)
MONOCYTES NFR BLD: 9 % (ref 2–12)
NEUTROPHILS NFR BLD: 62 % (ref 43–80)
NEUTS SEG NFR BLD: 3.63 K/UL (ref 1.8–7.3)
PLATELET # BLD AUTO: 165 K/UL (ref 130–450)
PMV BLD AUTO: 11.4 FL (ref 7–12)
POTASSIUM SERPL-SCNC: 4.1 MMOL/L (ref 3.5–5)
POTASSIUM SERPL-SCNC: 4.2 MMOL/L (ref 3.5–5)
PROT SERPL-MCNC: 6.1 G/DL (ref 6.4–8.3)
RBC # BLD AUTO: 3.66 M/UL (ref 3.8–5.8)
SODIUM SERPL-SCNC: 138 MMOL/L (ref 132–146)
SODIUM SERPL-SCNC: 141 MMOL/L (ref 132–146)
SODIUM UR-SCNC: 66 MMOL/L
SPECIMEN DESCRIPTION: NORMAL
TOTAL PROTEIN, URINE: 66 MG/DL (ref 0–12)
TSH SERPL DL<=0.05 MIU/L-ACNC: 0.05 UIU/ML (ref 0.27–4.2)
URINE TOTAL PROTEIN CREATININE RATIO: 0.64 (ref 0–0.2)
UUN UR-MCNC: 889 MG/DL (ref 800–1666)
WBC OTHER # BLD: 5.8 K/UL (ref 4.5–11.5)

## 2023-10-01 PROCEDURE — 6370000000 HC RX 637 (ALT 250 FOR IP): Performed by: STUDENT IN AN ORGANIZED HEALTH CARE EDUCATION/TRAINING PROGRAM

## 2023-10-01 PROCEDURE — 84443 ASSAY THYROID STIM HORMONE: CPT

## 2023-10-01 PROCEDURE — 6360000002 HC RX W HCPCS: Performed by: INTERNAL MEDICINE

## 2023-10-01 PROCEDURE — 84540 ASSAY OF URINE/UREA-N: CPT

## 2023-10-01 PROCEDURE — 2580000003 HC RX 258: Performed by: STUDENT IN AN ORGANIZED HEALTH CARE EDUCATION/TRAINING PROGRAM

## 2023-10-01 PROCEDURE — 84156 ASSAY OF PROTEIN URINE: CPT

## 2023-10-01 PROCEDURE — 82570 ASSAY OF URINE CREATININE: CPT

## 2023-10-01 PROCEDURE — 80048 BASIC METABOLIC PNL TOTAL CA: CPT

## 2023-10-01 PROCEDURE — 85025 COMPLETE CBC W/AUTO DIFF WBC: CPT

## 2023-10-01 PROCEDURE — 51798 US URINE CAPACITY MEASURE: CPT

## 2023-10-01 PROCEDURE — 6370000000 HC RX 637 (ALT 250 FOR IP): Performed by: FAMILY MEDICINE

## 2023-10-01 PROCEDURE — 6360000002 HC RX W HCPCS: Performed by: FAMILY MEDICINE

## 2023-10-01 PROCEDURE — 2580000003 HC RX 258: Performed by: FAMILY MEDICINE

## 2023-10-01 PROCEDURE — 80053 COMPREHEN METABOLIC PANEL: CPT

## 2023-10-01 PROCEDURE — 84300 ASSAY OF URINE SODIUM: CPT

## 2023-10-01 PROCEDURE — P9047 ALBUMIN (HUMAN), 25%, 50ML: HCPCS | Performed by: INTERNAL MEDICINE

## 2023-10-01 PROCEDURE — 2060000000 HC ICU INTERMEDIATE R&B

## 2023-10-01 PROCEDURE — 36415 COLL VENOUS BLD VENIPUNCTURE: CPT

## 2023-10-01 PROCEDURE — 82962 GLUCOSE BLOOD TEST: CPT

## 2023-10-01 RX ORDER — ALBUMIN (HUMAN) 12.5 G/50ML
25 SOLUTION INTRAVENOUS EVERY 8 HOURS
Status: COMPLETED | OUTPATIENT
Start: 2023-10-01 | End: 2023-10-02

## 2023-10-01 RX ORDER — ENOXAPARIN SODIUM 100 MG/ML
30 INJECTION SUBCUTANEOUS 2 TIMES DAILY
Status: DISCONTINUED | OUTPATIENT
Start: 2023-10-01 | End: 2023-10-04 | Stop reason: HOSPADM

## 2023-10-01 RX ADMIN — ENOXAPARIN SODIUM 30 MG: 100 INJECTION SUBCUTANEOUS at 21:45

## 2023-10-01 RX ADMIN — ROSUVASTATIN 40 MG: 20 TABLET, FILM COATED ORAL at 17:23

## 2023-10-01 RX ADMIN — SODIUM CHLORIDE, POTASSIUM CHLORIDE, SODIUM LACTATE AND CALCIUM CHLORIDE: 600; 310; 30; 20 INJECTION, SOLUTION INTRAVENOUS at 15:34

## 2023-10-01 RX ADMIN — ENOXAPARIN SODIUM 30 MG: 100 INJECTION SUBCUTANEOUS at 08:13

## 2023-10-01 RX ADMIN — SODIUM CHLORIDE, PRESERVATIVE FREE 10 ML: 5 INJECTION INTRAVENOUS at 21:43

## 2023-10-01 RX ADMIN — TAMSULOSIN HYDROCHLORIDE 0.4 MG: 0.4 CAPSULE ORAL at 21:45

## 2023-10-01 RX ADMIN — ISOSORBIDE MONONITRATE 60 MG: 30 TABLET, EXTENDED RELEASE ORAL at 08:13

## 2023-10-01 RX ADMIN — ASPIRIN 81 MG: 81 TABLET, COATED ORAL at 08:13

## 2023-10-01 RX ADMIN — LEVOTHYROXINE SODIUM 50 MCG: 0.05 TABLET ORAL at 05:19

## 2023-10-01 RX ADMIN — CARVEDILOL 25 MG: 25 TABLET, FILM COATED ORAL at 17:23

## 2023-10-01 RX ADMIN — WATER 1000 MG: 1 INJECTION INTRAMUSCULAR; INTRAVENOUS; SUBCUTANEOUS at 21:45

## 2023-10-01 RX ADMIN — TAMSULOSIN HYDROCHLORIDE 0.4 MG: 0.4 CAPSULE ORAL at 08:13

## 2023-10-01 RX ADMIN — RANOLAZINE 1000 MG: 500 TABLET, EXTENDED RELEASE ORAL at 21:45

## 2023-10-01 RX ADMIN — ALBUMIN (HUMAN) 25 G: 0.25 INJECTION, SOLUTION INTRAVENOUS at 23:05

## 2023-10-01 RX ADMIN — ALBUMIN (HUMAN) 25 G: 0.25 INJECTION, SOLUTION INTRAVENOUS at 17:27

## 2023-10-01 RX ADMIN — METRONIDAZOLE 500 MG: 500 INJECTION, SOLUTION INTRAVENOUS at 21:44

## 2023-10-01 RX ADMIN — RANOLAZINE 1000 MG: 500 TABLET, EXTENDED RELEASE ORAL at 08:14

## 2023-10-01 RX ADMIN — CARVEDILOL 25 MG: 25 TABLET, FILM COATED ORAL at 08:13

## 2023-10-01 RX ADMIN — PANTOPRAZOLE SODIUM 40 MG: 40 TABLET, DELAYED RELEASE ORAL at 05:19

## 2023-10-01 RX ADMIN — METRONIDAZOLE 500 MG: 500 INJECTION, SOLUTION INTRAVENOUS at 15:35

## 2023-10-01 RX ADMIN — METRONIDAZOLE 500 MG: 500 INJECTION, SOLUTION INTRAVENOUS at 05:18

## 2023-10-01 RX ADMIN — SODIUM CHLORIDE, POTASSIUM CHLORIDE, SODIUM LACTATE AND CALCIUM CHLORIDE: 600; 310; 30; 20 INJECTION, SOLUTION INTRAVENOUS at 08:17

## 2023-10-01 RX ADMIN — SODIUM BICARBONATE 650 MG: 650 TABLET ORAL at 08:14

## 2023-10-01 RX ADMIN — ISOSORBIDE MONONITRATE 60 MG: 30 TABLET, EXTENDED RELEASE ORAL at 21:45

## 2023-10-01 ASSESSMENT — PAIN SCALES - GENERAL
PAINLEVEL_OUTOF10: 0

## 2023-10-01 NOTE — PLAN OF CARE
Problem: Discharge Planning  Goal: Discharge to home or other facility with appropriate resources  Outcome: Progressing  Flowsheets (Taken 10/1/2023 0812)  Discharge to home or other facility with appropriate resources: Identify barriers to discharge with patient and caregiver     Problem: Safety - Adult  Goal: Free from fall injury  Outcome: Progressing  Flowsheets (Taken 10/1/2023 1316)  Free From Fall Injury: Instruct family/caregiver on patient safety

## 2023-10-01 NOTE — FLOWSHEET NOTE
Post void bladder scan completed.        10/01/23 1540   Urine Assessment   Bladder Scan Volume (mL) 28 mL  (post void)

## 2023-10-02 LAB
ANION GAP SERPL CALCULATED.3IONS-SCNC: 9 MMOL/L (ref 7–16)
BASOPHILS # BLD: 0.03 K/UL (ref 0–0.2)
BASOPHILS NFR BLD: 1 % (ref 0–2)
BUN SERPL-MCNC: 21 MG/DL (ref 6–23)
CALCIUM SERPL-MCNC: 8.3 MG/DL (ref 8.6–10.2)
CHLORIDE SERPL-SCNC: 111 MMOL/L (ref 98–107)
CO2 SERPL-SCNC: 20 MMOL/L (ref 22–29)
CREAT SERPL-MCNC: 1.4 MG/DL (ref 0.7–1.2)
EOSINOPHIL # BLD: 0.28 K/UL (ref 0.05–0.5)
EOSINOPHILS RELATIVE PERCENT: 5 % (ref 0–6)
ERYTHROCYTE [DISTWIDTH] IN BLOOD BY AUTOMATED COUNT: 12.3 % (ref 11.5–15)
GFR SERPL CREATININE-BSD FRML MDRD: 52 ML/MIN/1.73M2
GLUCOSE BLD-MCNC: 94 MG/DL (ref 74–99)
GLUCOSE SERPL-MCNC: 92 MG/DL (ref 74–99)
HCT VFR BLD AUTO: 30.2 % (ref 37–54)
HGB BLD-MCNC: 10.4 G/DL (ref 12.5–16.5)
IMM GRANULOCYTES # BLD AUTO: <0.03 K/UL (ref 0–0.58)
IMM GRANULOCYTES NFR BLD: 0 % (ref 0–5)
LYMPHOCYTES NFR BLD: 1.61 K/UL (ref 1.5–4)
LYMPHOCYTES RELATIVE PERCENT: 26 % (ref 20–42)
MCH RBC QN AUTO: 30.3 PG (ref 26–35)
MCHC RBC AUTO-ENTMCNC: 34.4 G/DL (ref 32–34.5)
MCV RBC AUTO: 88 FL (ref 80–99.9)
MONOCYTES NFR BLD: 0.47 K/UL (ref 0.1–0.95)
MONOCYTES NFR BLD: 8 % (ref 2–12)
NEUTROPHILS NFR BLD: 61 % (ref 43–80)
NEUTS SEG NFR BLD: 3.74 K/UL (ref 1.8–7.3)
PLATELET # BLD AUTO: 158 K/UL (ref 130–450)
PMV BLD AUTO: 11.3 FL (ref 7–12)
POTASSIUM SERPL-SCNC: 3.8 MMOL/L (ref 3.5–5)
RBC # BLD AUTO: 3.43 M/UL (ref 3.8–5.8)
SODIUM SERPL-SCNC: 140 MMOL/L (ref 132–146)
WBC OTHER # BLD: 6.1 K/UL (ref 4.5–11.5)

## 2023-10-02 PROCEDURE — 6370000000 HC RX 637 (ALT 250 FOR IP): Performed by: FAMILY MEDICINE

## 2023-10-02 PROCEDURE — 36415 COLL VENOUS BLD VENIPUNCTURE: CPT

## 2023-10-02 PROCEDURE — 6360000002 HC RX W HCPCS: Performed by: INTERNAL MEDICINE

## 2023-10-02 PROCEDURE — 2500000003 HC RX 250 WO HCPCS: Performed by: INTERNAL MEDICINE

## 2023-10-02 PROCEDURE — 2580000003 HC RX 258: Performed by: STUDENT IN AN ORGANIZED HEALTH CARE EDUCATION/TRAINING PROGRAM

## 2023-10-02 PROCEDURE — P9047 ALBUMIN (HUMAN), 25%, 50ML: HCPCS | Performed by: INTERNAL MEDICINE

## 2023-10-02 PROCEDURE — 2580000003 HC RX 258: Performed by: INTERNAL MEDICINE

## 2023-10-02 PROCEDURE — 6370000000 HC RX 637 (ALT 250 FOR IP): Performed by: STUDENT IN AN ORGANIZED HEALTH CARE EDUCATION/TRAINING PROGRAM

## 2023-10-02 PROCEDURE — 80048 BASIC METABOLIC PNL TOTAL CA: CPT

## 2023-10-02 PROCEDURE — 2060000000 HC ICU INTERMEDIATE R&B

## 2023-10-02 PROCEDURE — 2580000003 HC RX 258: Performed by: FAMILY MEDICINE

## 2023-10-02 PROCEDURE — 82962 GLUCOSE BLOOD TEST: CPT

## 2023-10-02 PROCEDURE — 6360000002 HC RX W HCPCS: Performed by: STUDENT IN AN ORGANIZED HEALTH CARE EDUCATION/TRAINING PROGRAM

## 2023-10-02 PROCEDURE — 85025 COMPLETE CBC W/AUTO DIFF WBC: CPT

## 2023-10-02 PROCEDURE — 6360000002 HC RX W HCPCS: Performed by: FAMILY MEDICINE

## 2023-10-02 RX ORDER — LABETALOL HYDROCHLORIDE 5 MG/ML
10 INJECTION, SOLUTION INTRAVENOUS EVERY 6 HOURS
Status: DISCONTINUED | OUTPATIENT
Start: 2023-10-02 | End: 2023-10-04 | Stop reason: HOSPADM

## 2023-10-02 RX ADMIN — WATER 1000 MG: 1 INJECTION INTRAMUSCULAR; INTRAVENOUS; SUBCUTANEOUS at 20:31

## 2023-10-02 RX ADMIN — SODIUM CHLORIDE, PRESERVATIVE FREE 10 ML: 5 INJECTION INTRAVENOUS at 07:39

## 2023-10-02 RX ADMIN — LABETALOL HYDROCHLORIDE 10 MG: 5 INJECTION INTRAVENOUS at 15:31

## 2023-10-02 RX ADMIN — METRONIDAZOLE 500 MG: 500 INJECTION, SOLUTION INTRAVENOUS at 05:42

## 2023-10-02 RX ADMIN — SODIUM CHLORIDE, POTASSIUM CHLORIDE, SODIUM LACTATE AND CALCIUM CHLORIDE: 600; 310; 30; 20 INJECTION, SOLUTION INTRAVENOUS at 07:46

## 2023-10-02 RX ADMIN — SODIUM CHLORIDE, POTASSIUM CHLORIDE, SODIUM LACTATE AND CALCIUM CHLORIDE: 600; 310; 30; 20 INJECTION, SOLUTION INTRAVENOUS at 02:50

## 2023-10-02 RX ADMIN — ALBUMIN (HUMAN) 25 G: 0.25 INJECTION, SOLUTION INTRAVENOUS at 07:50

## 2023-10-02 RX ADMIN — RANOLAZINE 1000 MG: 500 TABLET, EXTENDED RELEASE ORAL at 20:30

## 2023-10-02 RX ADMIN — CARVEDILOL 25 MG: 25 TABLET, FILM COATED ORAL at 17:08

## 2023-10-02 RX ADMIN — RANOLAZINE 1000 MG: 500 TABLET, EXTENDED RELEASE ORAL at 07:39

## 2023-10-02 RX ADMIN — ENOXAPARIN SODIUM 30 MG: 100 INJECTION SUBCUTANEOUS at 07:39

## 2023-10-02 RX ADMIN — LEVOTHYROXINE SODIUM 50 MCG: 0.05 TABLET ORAL at 05:41

## 2023-10-02 RX ADMIN — TAMSULOSIN HYDROCHLORIDE 0.4 MG: 0.4 CAPSULE ORAL at 20:30

## 2023-10-02 RX ADMIN — ACETAMINOPHEN 650 MG: 325 TABLET ORAL at 15:27

## 2023-10-02 RX ADMIN — ENOXAPARIN SODIUM 30 MG: 100 INJECTION SUBCUTANEOUS at 20:33

## 2023-10-02 RX ADMIN — ROSUVASTATIN 40 MG: 20 TABLET, FILM COATED ORAL at 17:08

## 2023-10-02 RX ADMIN — LABETALOL HYDROCHLORIDE 10 MG: 5 INJECTION INTRAVENOUS at 20:31

## 2023-10-02 RX ADMIN — SODIUM BICARBONATE: 84 INJECTION, SOLUTION INTRAVENOUS at 14:03

## 2023-10-02 RX ADMIN — SODIUM BICARBONATE 650 MG: 650 TABLET ORAL at 07:39

## 2023-10-02 RX ADMIN — ISOSORBIDE MONONITRATE 60 MG: 30 TABLET, EXTENDED RELEASE ORAL at 07:39

## 2023-10-02 RX ADMIN — PANTOPRAZOLE SODIUM 40 MG: 40 TABLET, DELAYED RELEASE ORAL at 05:41

## 2023-10-02 RX ADMIN — METRONIDAZOLE 500 MG: 500 INJECTION, SOLUTION INTRAVENOUS at 22:34

## 2023-10-02 RX ADMIN — METRONIDAZOLE 500 MG: 500 INJECTION, SOLUTION INTRAVENOUS at 15:14

## 2023-10-02 RX ADMIN — CARVEDILOL 25 MG: 25 TABLET, FILM COATED ORAL at 07:39

## 2023-10-02 RX ADMIN — TAMSULOSIN HYDROCHLORIDE 0.4 MG: 0.4 CAPSULE ORAL at 07:39

## 2023-10-02 RX ADMIN — SODIUM CHLORIDE, PRESERVATIVE FREE 10 ML: 5 INJECTION INTRAVENOUS at 20:32

## 2023-10-02 RX ADMIN — ALBUMIN (HUMAN) 25 G: 0.25 INJECTION, SOLUTION INTRAVENOUS at 15:27

## 2023-10-02 RX ADMIN — ISOSORBIDE MONONITRATE 60 MG: 30 TABLET, EXTENDED RELEASE ORAL at 20:30

## 2023-10-02 RX ADMIN — ASPIRIN 81 MG: 81 TABLET, COATED ORAL at 07:39

## 2023-10-02 ASSESSMENT — PAIN SCALES - GENERAL: PAINLEVEL_OUTOF10: 0

## 2023-10-02 NOTE — PLAN OF CARE
Problem: Discharge Planning  Goal: Discharge to home or other facility with appropriate resources  Outcome: Progressing  Flowsheets (Taken 10/2/2023 2708)  Discharge to home or other facility with appropriate resources: Identify barriers to discharge with patient and caregiver     Problem: Safety - Adult  Goal: Free from fall injury  Outcome: Progressing  Flowsheets (Taken 10/2/2023 1098)  Free From Fall Injury: Instruct family/caregiver on patient safety

## 2023-10-02 NOTE — CARE COORDINATION
Care Coordination  Met with the patient at bedside to discuss transition care planning. He lives in a 1 story home with his wife and son . He was independent prior to admission. He has no dme and was independent prior to admission. No history of skilled care or hhc. His physician is [facility] and his pharmacy is [facility]. His plan is to return home with his wife and son once stable. The patient was admitted with hypotension and its been low since Sunday. His bp on admission was 72/36. He is  now 124/44 after iv fluid bolus. His b/c are much improved this am to 21/1. 4. he was found to have a positive UA. He is on ivf at 125 cc hr and iv rocephin q24 hrs. Will follow

## 2023-10-03 LAB
ANION GAP SERPL CALCULATED.3IONS-SCNC: 13 MMOL/L (ref 7–16)
BASOPHILS # BLD: 0.04 K/UL (ref 0–0.2)
BASOPHILS NFR BLD: 1 % (ref 0–2)
BUN SERPL-MCNC: 14 MG/DL (ref 6–23)
CALCIUM SERPL-MCNC: 8 MG/DL (ref 8.6–10.2)
CHLORIDE SERPL-SCNC: 108 MMOL/L (ref 98–107)
CO2 SERPL-SCNC: 23 MMOL/L (ref 22–29)
CREAT SERPL-MCNC: 1.3 MG/DL (ref 0.7–1.2)
EOSINOPHIL # BLD: 0.35 K/UL (ref 0.05–0.5)
EOSINOPHILS RELATIVE PERCENT: 6 % (ref 0–6)
ERYTHROCYTE [DISTWIDTH] IN BLOOD BY AUTOMATED COUNT: 12.5 % (ref 11.5–15)
FERRITIN SERPL-MCNC: 193 NG/ML
FOLATE SERPL-MCNC: 7.5 NG/ML (ref 4.8–24.2)
GFR SERPL CREATININE-BSD FRML MDRD: 54 ML/MIN/1.73M2
GLUCOSE SERPL-MCNC: 111 MG/DL (ref 74–99)
HCT VFR BLD AUTO: 29.5 % (ref 37–54)
HCT VFR BLD AUTO: 31.9 % (ref 37–54)
HGB BLD-MCNC: 10 G/DL (ref 12.5–16.5)
HGB BLD-MCNC: 10.9 G/DL (ref 12.5–16.5)
IMM GRANULOCYTES # BLD AUTO: 0.03 K/UL (ref 0–0.58)
IMM GRANULOCYTES NFR BLD: 1 % (ref 0–5)
IRON SATN MFR SERPL: 21 % (ref 20–55)
IRON SERPL-MCNC: 34 UG/DL (ref 59–158)
LYMPHOCYTES NFR BLD: 1.55 K/UL (ref 1.5–4)
LYMPHOCYTES RELATIVE PERCENT: 24 % (ref 20–42)
MAGNESIUM SERPL-MCNC: 1.3 MG/DL (ref 1.6–2.6)
MCH RBC QN AUTO: 29.5 PG (ref 26–35)
MCHC RBC AUTO-ENTMCNC: 33.9 G/DL (ref 32–34.5)
MCV RBC AUTO: 87 FL (ref 80–99.9)
MICROORGANISM SPEC CULT: NORMAL
MICROORGANISM SPEC CULT: NORMAL
MONOCYTES NFR BLD: 0.52 K/UL (ref 0.1–0.95)
MONOCYTES NFR BLD: 8 % (ref 2–12)
NEUTROPHILS NFR BLD: 61 % (ref 43–80)
NEUTS SEG NFR BLD: 3.86 K/UL (ref 1.8–7.3)
PHOSPHATE SERPL-MCNC: 1.7 MG/DL (ref 2.5–4.5)
PLATELET # BLD AUTO: 164 K/UL (ref 130–450)
PMV BLD AUTO: 11.6 FL (ref 7–12)
POTASSIUM SERPL-SCNC: 3.3 MMOL/L (ref 3.5–5)
RBC # BLD AUTO: 3.39 M/UL (ref 3.8–5.8)
SERVICE CMNT-IMP: NORMAL
SERVICE CMNT-IMP: NORMAL
SODIUM SERPL-SCNC: 144 MMOL/L (ref 132–146)
SPECIMEN DESCRIPTION: NORMAL
SPECIMEN DESCRIPTION: NORMAL
TIBC SERPL-MCNC: 160 UG/DL (ref 250–450)
VIT B12 SERPL-MCNC: 586 PG/ML (ref 211–946)
WBC OTHER # BLD: 6.4 K/UL (ref 4.5–11.5)

## 2023-10-03 PROCEDURE — 6370000000 HC RX 637 (ALT 250 FOR IP): Performed by: FAMILY MEDICINE

## 2023-10-03 PROCEDURE — 6360000002 HC RX W HCPCS: Performed by: FAMILY MEDICINE

## 2023-10-03 PROCEDURE — 82607 VITAMIN B-12: CPT

## 2023-10-03 PROCEDURE — 83735 ASSAY OF MAGNESIUM: CPT

## 2023-10-03 PROCEDURE — 2060000000 HC ICU INTERMEDIATE R&B

## 2023-10-03 PROCEDURE — 36415 COLL VENOUS BLD VENIPUNCTURE: CPT

## 2023-10-03 PROCEDURE — 6370000000 HC RX 637 (ALT 250 FOR IP): Performed by: STUDENT IN AN ORGANIZED HEALTH CARE EDUCATION/TRAINING PROGRAM

## 2023-10-03 PROCEDURE — 2580000003 HC RX 258: Performed by: FAMILY MEDICINE

## 2023-10-03 PROCEDURE — A4216 STERILE WATER/SALINE, 10 ML: HCPCS | Performed by: FAMILY MEDICINE

## 2023-10-03 PROCEDURE — 82728 ASSAY OF FERRITIN: CPT

## 2023-10-03 PROCEDURE — C9113 INJ PANTOPRAZOLE SODIUM, VIA: HCPCS | Performed by: FAMILY MEDICINE

## 2023-10-03 PROCEDURE — 2580000003 HC RX 258: Performed by: INTERNAL MEDICINE

## 2023-10-03 PROCEDURE — 6360000002 HC RX W HCPCS: Performed by: STUDENT IN AN ORGANIZED HEALTH CARE EDUCATION/TRAINING PROGRAM

## 2023-10-03 PROCEDURE — 85014 HEMATOCRIT: CPT

## 2023-10-03 PROCEDURE — 84100 ASSAY OF PHOSPHORUS: CPT

## 2023-10-03 PROCEDURE — 80048 BASIC METABOLIC PNL TOTAL CA: CPT

## 2023-10-03 PROCEDURE — 83540 ASSAY OF IRON: CPT

## 2023-10-03 PROCEDURE — 2500000003 HC RX 250 WO HCPCS

## 2023-10-03 PROCEDURE — 85018 HEMOGLOBIN: CPT

## 2023-10-03 PROCEDURE — 85025 COMPLETE CBC W/AUTO DIFF WBC: CPT

## 2023-10-03 PROCEDURE — 2580000003 HC RX 258

## 2023-10-03 PROCEDURE — 82746 ASSAY OF FOLIC ACID SERUM: CPT

## 2023-10-03 PROCEDURE — 2500000003 HC RX 250 WO HCPCS: Performed by: INTERNAL MEDICINE

## 2023-10-03 PROCEDURE — 83550 IRON BINDING TEST: CPT

## 2023-10-03 RX ORDER — AMLODIPINE BESYLATE 5 MG/1
5 TABLET ORAL DAILY
Status: DISCONTINUED | OUTPATIENT
Start: 2023-10-03 | End: 2023-10-04 | Stop reason: HOSPADM

## 2023-10-03 RX ORDER — MAGNESIUM SULFATE IN WATER 40 MG/ML
2000 INJECTION, SOLUTION INTRAVENOUS PRN
Status: DISCONTINUED | OUTPATIENT
Start: 2023-10-03 | End: 2023-10-04 | Stop reason: HOSPADM

## 2023-10-03 RX ORDER — MAGNESIUM SULFATE IN WATER 40 MG/ML
2000 INJECTION, SOLUTION INTRAVENOUS ONCE
Status: DISCONTINUED | OUTPATIENT
Start: 2023-10-03 | End: 2023-10-03

## 2023-10-03 RX ORDER — POTASSIUM CHLORIDE 7.45 MG/ML
10 INJECTION INTRAVENOUS PRN
Status: DISCONTINUED | OUTPATIENT
Start: 2023-10-03 | End: 2023-10-04 | Stop reason: HOSPADM

## 2023-10-03 RX ORDER — POTASSIUM CHLORIDE 20 MEQ/1
40 TABLET, EXTENDED RELEASE ORAL PRN
Status: DISCONTINUED | OUTPATIENT
Start: 2023-10-03 | End: 2023-10-04 | Stop reason: HOSPADM

## 2023-10-03 RX ADMIN — MAGNESIUM SULFATE HEPTAHYDRATE 2000 MG: 40 INJECTION, SOLUTION INTRAVENOUS at 09:03

## 2023-10-03 RX ADMIN — RANOLAZINE 1000 MG: 500 TABLET, EXTENDED RELEASE ORAL at 21:34

## 2023-10-03 RX ADMIN — LABETALOL HYDROCHLORIDE 10 MG: 5 INJECTION INTRAVENOUS at 02:31

## 2023-10-03 RX ADMIN — WATER 1000 MG: 1 INJECTION INTRAMUSCULAR; INTRAVENOUS; SUBCUTANEOUS at 21:34

## 2023-10-03 RX ADMIN — ASPIRIN 81 MG: 81 TABLET, COATED ORAL at 08:55

## 2023-10-03 RX ADMIN — METRONIDAZOLE 500 MG: 500 INJECTION, SOLUTION INTRAVENOUS at 06:16

## 2023-10-03 RX ADMIN — LABETALOL HYDROCHLORIDE 10 MG: 5 INJECTION INTRAVENOUS at 21:37

## 2023-10-03 RX ADMIN — ISOSORBIDE MONONITRATE 60 MG: 30 TABLET, EXTENDED RELEASE ORAL at 21:34

## 2023-10-03 RX ADMIN — AMLODIPINE BESYLATE 5 MG: 5 TABLET ORAL at 12:56

## 2023-10-03 RX ADMIN — PANTOPRAZOLE SODIUM 40 MG: 40 TABLET, DELAYED RELEASE ORAL at 06:07

## 2023-10-03 RX ADMIN — SODIUM CHLORIDE, PRESERVATIVE FREE 10 ML: 5 INJECTION INTRAVENOUS at 08:56

## 2023-10-03 RX ADMIN — POTASSIUM PHOSPHATE, MONOBASIC AND POTASSIUM PHOSPHATE, DIBASIC 10 MMOL: 224; 236 INJECTION, SOLUTION, CONCENTRATE INTRAVENOUS at 13:01

## 2023-10-03 RX ADMIN — TAMSULOSIN HYDROCHLORIDE 0.4 MG: 0.4 CAPSULE ORAL at 21:34

## 2023-10-03 RX ADMIN — POTASSIUM CHLORIDE 40 MEQ: 1500 TABLET, EXTENDED RELEASE ORAL at 09:00

## 2023-10-03 RX ADMIN — ENOXAPARIN SODIUM 30 MG: 100 INJECTION SUBCUTANEOUS at 08:55

## 2023-10-03 RX ADMIN — LEVOTHYROXINE SODIUM 50 MCG: 0.05 TABLET ORAL at 06:07

## 2023-10-03 RX ADMIN — SODIUM BICARBONATE: 84 INJECTION, SOLUTION INTRAVENOUS at 02:34

## 2023-10-03 RX ADMIN — TAMSULOSIN HYDROCHLORIDE 0.4 MG: 0.4 CAPSULE ORAL at 08:55

## 2023-10-03 RX ADMIN — RANOLAZINE 1000 MG: 500 TABLET, EXTENDED RELEASE ORAL at 08:55

## 2023-10-03 RX ADMIN — PANTOPRAZOLE SODIUM 40 MG: 40 INJECTION, POWDER, FOR SOLUTION INTRAVENOUS at 12:57

## 2023-10-03 RX ADMIN — METRONIDAZOLE 500 MG: 500 INJECTION, SOLUTION INTRAVENOUS at 16:00

## 2023-10-03 RX ADMIN — CARVEDILOL 25 MG: 25 TABLET, FILM COATED ORAL at 08:55

## 2023-10-03 RX ADMIN — ISOSORBIDE MONONITRATE 60 MG: 30 TABLET, EXTENDED RELEASE ORAL at 08:55

## 2023-10-03 ASSESSMENT — PAIN SCALES - GENERAL: PAINLEVEL_OUTOF10: 0

## 2023-10-03 NOTE — CARE COORDINATION
Care Coordination  The patient was admitted with hypotension and was found to have a uti. He is tolerating his diet without difficulty. His creatinine is down to 1.3, potassium is 3.3 magnesium 1.3. His bp this am is 167/79 and mush better. He is on Iv diverticulitis and rocephin and flagyl. Plan to dc ivf and replace deficiencies. He was independent prior to admission and his plan is to return home with his wife upon discharge.

## 2023-10-04 ENCOUNTER — TELEPHONE (OUTPATIENT)
Dept: FAMILY MEDICINE CLINIC | Age: 78
End: 2023-10-04

## 2023-10-04 VITALS
HEART RATE: 73 BPM | RESPIRATION RATE: 18 BRPM | WEIGHT: 250 LBS | DIASTOLIC BLOOD PRESSURE: 80 MMHG | OXYGEN SATURATION: 97 % | BODY MASS INDEX: 35.79 KG/M2 | HEIGHT: 70 IN | TEMPERATURE: 98.5 F | SYSTOLIC BLOOD PRESSURE: 144 MMHG

## 2023-10-04 LAB
25(OH)D3 SERPL-MCNC: 20.4 NG/ML (ref 30–100)
ALBUMIN SERPL-MCNC: 3.3 G/DL (ref 3.5–5.2)
ALP SERPL-CCNC: 69 U/L (ref 40–129)
ALT SERPL-CCNC: 17 U/L (ref 0–40)
ANION GAP SERPL CALCULATED.3IONS-SCNC: 11 MMOL/L (ref 7–16)
AST SERPL-CCNC: 25 U/L (ref 0–39)
BASOPHILS # BLD: 0.04 K/UL (ref 0–0.2)
BASOPHILS NFR BLD: 1 % (ref 0–2)
BILIRUB SERPL-MCNC: 0.4 MG/DL (ref 0–1.2)
BUN SERPL-MCNC: 12 MG/DL (ref 6–23)
CALCIUM SERPL-MCNC: 7.8 MG/DL (ref 8.6–10.2)
CHLORIDE SERPL-SCNC: 106 MMOL/L (ref 98–107)
CO2 SERPL-SCNC: 25 MMOL/L (ref 22–29)
CREAT SERPL-MCNC: 1.3 MG/DL (ref 0.7–1.2)
EOSINOPHIL # BLD: 0.38 K/UL (ref 0.05–0.5)
EOSINOPHILS RELATIVE PERCENT: 5 % (ref 0–6)
ERYTHROCYTE [DISTWIDTH] IN BLOOD BY AUTOMATED COUNT: 12.8 % (ref 11.5–15)
GFR SERPL CREATININE-BSD FRML MDRD: 59 ML/MIN/1.73M2
GLUCOSE SERPL-MCNC: 127 MG/DL (ref 74–99)
HCT VFR BLD AUTO: 31.8 % (ref 37–54)
HGB BLD-MCNC: 10.9 G/DL (ref 12.5–16.5)
IMM GRANULOCYTES # BLD AUTO: <0.03 K/UL (ref 0–0.58)
IMM GRANULOCYTES NFR BLD: 0 % (ref 0–5)
LYMPHOCYTES NFR BLD: 1.54 K/UL (ref 1.5–4)
LYMPHOCYTES RELATIVE PERCENT: 18 % (ref 20–42)
MAGNESIUM SERPL-MCNC: 1.3 MG/DL (ref 1.6–2.6)
MCH RBC QN AUTO: 30.2 PG (ref 26–35)
MCHC RBC AUTO-ENTMCNC: 34.3 G/DL (ref 32–34.5)
MCV RBC AUTO: 88.1 FL (ref 80–99.9)
MONOCYTES NFR BLD: 0.71 K/UL (ref 0.1–0.95)
MONOCYTES NFR BLD: 8 % (ref 2–12)
NEUTROPHILS NFR BLD: 68 % (ref 43–80)
NEUTS SEG NFR BLD: 5.82 K/UL (ref 1.8–7.3)
PHOSPHATE SERPL-MCNC: 2.1 MG/DL (ref 2.5–4.5)
PLATELET # BLD AUTO: 183 K/UL (ref 130–450)
PMV BLD AUTO: 11.8 FL (ref 7–12)
POTASSIUM SERPL-SCNC: 3.3 MMOL/L (ref 3.5–5)
PROT SERPL-MCNC: 5.6 G/DL (ref 6.4–8.3)
RBC # BLD AUTO: 3.61 M/UL (ref 3.8–5.8)
SODIUM SERPL-SCNC: 142 MMOL/L (ref 132–146)
WBC OTHER # BLD: 8.5 K/UL (ref 4.5–11.5)

## 2023-10-04 PROCEDURE — 2580000003 HC RX 258: Performed by: FAMILY MEDICINE

## 2023-10-04 PROCEDURE — 85025 COMPLETE CBC W/AUTO DIFF WBC: CPT

## 2023-10-04 PROCEDURE — A4216 STERILE WATER/SALINE, 10 ML: HCPCS | Performed by: FAMILY MEDICINE

## 2023-10-04 PROCEDURE — 82306 VITAMIN D 25 HYDROXY: CPT

## 2023-10-04 PROCEDURE — 6370000000 HC RX 637 (ALT 250 FOR IP): Performed by: STUDENT IN AN ORGANIZED HEALTH CARE EDUCATION/TRAINING PROGRAM

## 2023-10-04 PROCEDURE — 6370000000 HC RX 637 (ALT 250 FOR IP): Performed by: FAMILY MEDICINE

## 2023-10-04 PROCEDURE — 36415 COLL VENOUS BLD VENIPUNCTURE: CPT

## 2023-10-04 PROCEDURE — 83735 ASSAY OF MAGNESIUM: CPT

## 2023-10-04 PROCEDURE — C9113 INJ PANTOPRAZOLE SODIUM, VIA: HCPCS | Performed by: FAMILY MEDICINE

## 2023-10-04 PROCEDURE — 6360000002 HC RX W HCPCS: Performed by: STUDENT IN AN ORGANIZED HEALTH CARE EDUCATION/TRAINING PROGRAM

## 2023-10-04 PROCEDURE — 84100 ASSAY OF PHOSPHORUS: CPT

## 2023-10-04 PROCEDURE — 80053 COMPREHEN METABOLIC PANEL: CPT

## 2023-10-04 PROCEDURE — 6360000002 HC RX W HCPCS: Performed by: FAMILY MEDICINE

## 2023-10-04 RX ORDER — AMLODIPINE BESYLATE 5 MG/1
5 TABLET ORAL DAILY
Qty: 30 TABLET | Refills: 0 | Status: SHIPPED | OUTPATIENT
Start: 2023-10-04

## 2023-10-04 RX ORDER — LANOLIN ALCOHOL/MO/W.PET/CERES
400 CREAM (GRAM) TOPICAL 2 TIMES DAILY
Qty: 30 TABLET | Refills: 0 | Status: SHIPPED | OUTPATIENT
Start: 2023-10-04

## 2023-10-04 RX ORDER — FERROUS SULFATE 325(65) MG
325 TABLET ORAL DAILY
Qty: 30 TABLET | Refills: 3 | Status: SHIPPED | OUTPATIENT
Start: 2023-10-04

## 2023-10-04 RX ORDER — LANOLIN ALCOHOL/MO/W.PET/CERES
400 CREAM (GRAM) TOPICAL 2 TIMES DAILY
Status: DISCONTINUED | OUTPATIENT
Start: 2023-10-04 | End: 2023-10-04 | Stop reason: HOSPADM

## 2023-10-04 RX ORDER — CHOLECALCIFEROL (VITAMIN D3) 50 MCG
2000 TABLET ORAL DAILY
Status: DISCONTINUED | OUTPATIENT
Start: 2023-10-04 | End: 2023-10-04 | Stop reason: HOSPADM

## 2023-10-04 RX ORDER — FERROUS SULFATE 325(65) MG
325 TABLET ORAL 2 TIMES DAILY WITH MEALS
Status: DISCONTINUED | OUTPATIENT
Start: 2023-10-04 | End: 2023-10-04 | Stop reason: HOSPADM

## 2023-10-04 RX ORDER — POLYETHYLENE GLYCOL 3350 17 G/17G
17 POWDER, FOR SOLUTION ORAL DAILY PRN
Qty: 30 PACKET | Refills: 0 | Status: SHIPPED | OUTPATIENT
Start: 2023-10-04 | End: 2023-11-03

## 2023-10-04 RX ORDER — MAGNESIUM SULFATE IN WATER 40 MG/ML
4000 INJECTION, SOLUTION INTRAVENOUS ONCE
Status: COMPLETED | OUTPATIENT
Start: 2023-10-04 | End: 2023-10-04

## 2023-10-04 RX ORDER — AMOXICILLIN AND CLAVULANATE POTASSIUM 875; 125 MG/1; MG/1
1 TABLET, FILM COATED ORAL EVERY 12 HOURS SCHEDULED
Status: DISCONTINUED | OUTPATIENT
Start: 2023-10-04 | End: 2023-10-04 | Stop reason: HOSPADM

## 2023-10-04 RX ORDER — PANTOPRAZOLE SODIUM 40 MG/1
40 TABLET, DELAYED RELEASE ORAL
Qty: 30 TABLET | Refills: 0 | Status: SHIPPED | OUTPATIENT
Start: 2023-10-04

## 2023-10-04 RX ORDER — AMOXICILLIN AND CLAVULANATE POTASSIUM 875; 125 MG/1; MG/1
1 TABLET, FILM COATED ORAL EVERY 12 HOURS SCHEDULED
Qty: 6 TABLET | Refills: 0 | Status: SHIPPED | OUTPATIENT
Start: 2023-10-04 | End: 2023-10-07

## 2023-10-04 RX ADMIN — RANOLAZINE 1000 MG: 500 TABLET, EXTENDED RELEASE ORAL at 08:08

## 2023-10-04 RX ADMIN — MAGNESIUM SULFATE HEPTAHYDRATE 4000 MG: 40 INJECTION, SOLUTION INTRAVENOUS at 07:55

## 2023-10-04 RX ADMIN — ASPIRIN 81 MG: 81 TABLET, COATED ORAL at 08:08

## 2023-10-04 RX ADMIN — FERROUS SULFATE TAB 325 MG (65 MG ELEMENTAL FE) 325 MG: 325 (65 FE) TAB at 08:08

## 2023-10-04 RX ADMIN — TAMSULOSIN HYDROCHLORIDE 0.4 MG: 0.4 CAPSULE ORAL at 08:08

## 2023-10-04 RX ADMIN — SODIUM CHLORIDE, PRESERVATIVE FREE 10 ML: 5 INJECTION INTRAVENOUS at 03:45

## 2023-10-04 RX ADMIN — PANTOPRAZOLE SODIUM 40 MG: 40 INJECTION, POWDER, FOR SOLUTION INTRAVENOUS at 03:44

## 2023-10-04 RX ADMIN — Medication 400 MG: at 09:51

## 2023-10-04 RX ADMIN — SODIUM CHLORIDE, PRESERVATIVE FREE 10 ML: 5 INJECTION INTRAVENOUS at 08:09

## 2023-10-04 RX ADMIN — AMLODIPINE BESYLATE 5 MG: 5 TABLET ORAL at 08:08

## 2023-10-04 RX ADMIN — LABETALOL HYDROCHLORIDE 10 MG: 5 INJECTION INTRAVENOUS at 03:44

## 2023-10-04 RX ADMIN — CARVEDILOL 25 MG: 25 TABLET, FILM COATED ORAL at 08:08

## 2023-10-04 RX ADMIN — ISOSORBIDE MONONITRATE 60 MG: 30 TABLET, EXTENDED RELEASE ORAL at 08:08

## 2023-10-04 RX ADMIN — POTASSIUM CHLORIDE 40 MEQ: 1500 TABLET, EXTENDED RELEASE ORAL at 08:08

## 2023-10-04 RX ADMIN — Medication 2000 UNITS: at 09:51

## 2023-10-04 RX ADMIN — METRONIDAZOLE 500 MG: 500 INJECTION, SOLUTION INTRAVENOUS at 03:51

## 2023-10-04 RX ADMIN — LEVOTHYROXINE SODIUM 50 MCG: 0.05 TABLET ORAL at 06:24

## 2023-10-04 RX ADMIN — AMOXICILLIN AND CLAVULANATE POTASSIUM 1 TABLET: 875; 125 TABLET, FILM COATED ORAL at 09:51

## 2023-10-04 ASSESSMENT — PAIN SCALES - GENERAL: PAINLEVEL_OUTOF10: 0

## 2023-10-04 NOTE — DISCHARGE INSTRUCTIONS
Follow up with PCP  in 3-5 days. Recheck CBC , CMP Mg with PCP. Follow up with specialities on the case as per their recommendations . Check BP and HR at home 3 times day, write values down and follow up with PCP to adjust meds as needed. Check glucose at home, ACCUCHECKS ACHS , write values down and follow up with PCP to adjust meds as needed . The following medications are on hold upon discharge:  entresto ,insulin. please follow up with PCP to revaluate .

## 2023-10-04 NOTE — TELEPHONE ENCOUNTER
Care Transitions Initial Follow Up Call    Outreach made within 2 business days of discharge: Yes    Patient: Lianna Mesa Patient : 1945   MRN: [id number]  Reason for Admission:Low BP, dizzy. diverticulitis  Discharge Date: 10/4/23       Spoke with: Karen Yu    Discharge department/facility: [address] [place] [address]. TCM Interactive Patient Contact:  Was patient able to fill all prescriptions: Yes  Was patient instructed to bring all medications to the follow-up visit: Yes  Is patient taking all medications as directed in the discharge summary? Yes  Does patient understand their discharge instructions: Yes  Does patient have questions or concerns that need addressed prior to 7-14 day follow up office visit: no    Scheduled appointment with PCP within 7-14 days    Follow Up  Future Appointments   Date Time Provider [address] [place] [address]   10/5/2023 10:45 AM VERENICE Palacio CNP [facility]   2023  8:00 AM SCHEDULE, ERNESTO QUIÑONES [facility]   2023  9:00 AM Casimiro Smith APRN - CNP [facility]   2024  9:00 AM Casimiro Smith APRN - CNP [facility]       Jason Chowdary RN

## 2023-10-04 NOTE — DISCHARGE SUMMARY
Code     Activity: activity as tolerated    Diet: Regular /refer to [employer] instructions     Labs:  For convenience and continuity at follow-up the following most recent labs are provided:      CBC:    Lab Results   Component Value Date/Time    WBC 8.5 10/04/2023 03:51 AM    HGB 10.9 10/04/2023 03:51 AM    HCT 31.8 10/04/2023 03:51 AM     10/04/2023 03:51 AM       Renal:    Lab Results   Component Value Date/Time     10/04/2023 03:51 AM    K 3.3 10/04/2023 03:51 AM     10/04/2023 03:51 AM    CO2 25 10/04/2023 03:51 AM    BUN 12 10/04/2023 03:51 AM    CREATININE 1.3 10/04/2023 03:51 AM    CALCIUM 7.8 10/04/2023 03:51 AM    PHOS 1.7 10/03/2023 05:05 AM       Discharge Medications:     Current Discharge Medication List             Details   ferrous sulfate (IRON 325) 325 (65 Fe) MG tablet Take 1 tablet by mouth daily  Qty: 30 tablet, Refills: 3      polyethylene glycol (GLYCOLAX) 17 g packet Take 1 packet by mouth daily as needed for Constipation  Qty: 30 packet, Refills: 0      magnesium oxide (MAG-OX) 400 (240 Mg) MG tablet Take 1 tablet by mouth in the morning and at bedtime  Qty: 30 tablet, Refills: 0      amoxicillin-clavulanate (AUGMENTIN) 875-125 MG per tablet Take 1 tablet by mouth every 12 hours for 3 days  Qty: 6 tablet, Refills: 0      pantoprazole (PROTONIX) 40 MG tablet Take 1 tablet by mouth every morning (before breakfast)  Qty: 30 tablet, Refills: 0                Details   amLODIPine (NORVASC) 5 MG tablet Take 1 tablet by mouth daily  Qty: 30 tablet, Refills: 0                Details   carvedilol (COREG) 25 MG tablet Take 1 tablet by mouth 2 times daily (with meals)      isosorbide mononitrate (IMDUR) 60 MG extended release tablet Take 0.5 tablets by mouth 2 times daily      levothyroxine (SYNTHROID) 50 MCG tablet Take 1 tablet by mouth Daily      ranolazine (RANEXA) 1000 MG extended release tablet Take 1 tablet by mouth 2 times daily      ondansetron (ZOFRAN) 4 MG tablet Take 1 tablet by

## 2023-10-04 NOTE — CARE COORDINATION
Discharge order noted. Per interna med note today, At time of discharge patient was asymptomatic, physical exam was unremarkable, patient was very eager to be discharged. Condition at discharge improved and stable. Plan is home with wife and son. Patient said either his wife or son will transport him home today.   Tavo Munson RN CM  [phone]

## 2023-10-04 NOTE — PLAN OF CARE
Problem: Discharge Planning  Goal: Discharge to home or other facility with appropriate resources  10/4/2023 1000 by Laly Rodriguez RN  Outcome: Completed  10/4/2023 0959 by Laly Rodriguez RN  Outcome: Adequate for Discharge     Problem: Safety - Adult  Goal: Free from fall injury  10/4/2023 1000 by Laly Rodriguez RN  Outcome: Completed  10/4/2023 0959 by Laly Rodriguez RN  Outcome: Adequate for Discharge     Problem: Chronic Conditions and Co-morbidities  Goal: Patient's chronic conditions and co-morbidity symptoms are monitored and maintained or improved  10/4/2023 1000 by Laly Rodriguez RN  Outcome: Completed  10/4/2023 0959 by Laly Rodriguez RN  Outcome: Adequate for Discharge

## 2023-10-05 ENCOUNTER — OFFICE VISIT (OUTPATIENT)
Dept: FAMILY MEDICINE CLINIC | Age: 78
End: 2023-10-05

## 2023-10-05 ENCOUNTER — CLINICAL DOCUMENTATION ONLY (OUTPATIENT)
Facility: CLINIC | Age: 78
End: 2023-10-05

## 2023-10-05 VITALS
DIASTOLIC BLOOD PRESSURE: 62 MMHG | WEIGHT: 250 LBS | HEIGHT: 70 IN | RESPIRATION RATE: 17 BRPM | SYSTOLIC BLOOD PRESSURE: 118 MMHG | TEMPERATURE: 98 F | BODY MASS INDEX: 35.79 KG/M2 | OXYGEN SATURATION: 98 % | HEART RATE: 73 BPM

## 2023-10-05 DIAGNOSIS — Z00.00 ROUTINE GENERAL MEDICAL EXAMINATION AT A HEALTH CARE FACILITY: ICD-10-CM

## 2023-10-05 DIAGNOSIS — K57.30 DIVERTICULAR DISEASE OF COLON: ICD-10-CM

## 2023-10-05 DIAGNOSIS — Z95.5 H/O RIGHT CORONARY ARTERY STENT PLACEMENT: ICD-10-CM

## 2023-10-05 DIAGNOSIS — I20.9 ANGINA PECTORIS, UNSPECIFIED (HCC): ICD-10-CM

## 2023-10-05 DIAGNOSIS — I25.119 ATHEROSCLEROSIS OF NATIVE CORONARY ARTERY OF NATIVE HEART WITH ANGINA PECTORIS (HCC): ICD-10-CM

## 2023-10-05 DIAGNOSIS — Z98.890 HISTORY OF KNEE SURGERY: ICD-10-CM

## 2023-10-05 DIAGNOSIS — G47.33 OSA (OBSTRUCTIVE SLEEP APNEA): ICD-10-CM

## 2023-10-05 DIAGNOSIS — C61 MALIGNANT NEOPLASM OF PROSTATE (HCC): ICD-10-CM

## 2023-10-05 DIAGNOSIS — N18.31 STAGE 3A CHRONIC KIDNEY DISEASE (HCC): ICD-10-CM

## 2023-10-05 DIAGNOSIS — E78.01 FAMILIAL HYPERCHOLESTEROLEMIA: ICD-10-CM

## 2023-10-05 DIAGNOSIS — Z09 HOSPITAL DISCHARGE FOLLOW-UP: Primary | ICD-10-CM

## 2023-10-05 DIAGNOSIS — E03.9 HYPOTHYROIDISM, UNSPECIFIED TYPE: ICD-10-CM

## 2023-10-05 DIAGNOSIS — E21.3 HYPERPARATHYROIDISM (HCC): ICD-10-CM

## 2023-10-05 DIAGNOSIS — I10 PRIMARY HYPERTENSION: ICD-10-CM

## 2023-10-05 PROBLEM — I11.0 HYPERTENSIVE HEART DISEASE WITH HEART FAILURE (HCC): Status: ACTIVE | Noted: 2023-08-31

## 2023-10-05 PROBLEM — N40.0 BENIGN PROSTATIC HYPERPLASIA: Status: ACTIVE | Noted: 2022-12-01

## 2023-10-05 NOTE — PROGRESS NOTES
Post-Discharge Transitional Care  Follow Up      Popeye Garcia   YOB: 1945    Date of Office Visit:  10/5/2023  Date of Hospital Admission: 9/28/23  Date of Hospital Discharge: 10/4/23  Risk of hospital readmission (high >=14%. Medium >=10%) :Readmission Risk Score: 15.1      Care management risk score Rising risk (score 2-5) and Complex Care (Scores >=6): No Risk Score On File     Non face to face  following discharge, date last encounter closed (first attempt may have been earlier): 10/04/2023    Call initiated 2 business days of discharge: Yes    ASSESSMENT/PLAN:   Hospital discharge follow-up  -     AL DISCHARGE MEDS RECONCILED W/ CURRENT OUTPATIENT MED LIST  Stage 3a chronic kidney disease  Angina pectoris, unspecified  Atherosclerosis of native coronary artery of native heart with angina pectoris ([address])  Primary hypertension  Hypothyroidism, unspecified type  Familial hypercholesterolemia  Malignant neoplasm of prostate (HCC)  JOEL (obstructive sleep apnea)  H/O right coronary artery stent placement  Hyperparathyroidism ([address])  Diverticular disease of colon  History of knee surgery  Routine general medical examination at a health care facility      Medical Decision Making: moderate complexity  Return in 8 weeks (on 11/30/2023), or if symptoms worsen or fail to improve. On this date 10/5/2023 I have spent 30 minutes reviewing previous notes, test results and face to face with the patient discussing the diagnosis and importance of compliance with the treatment plan as well as documenting on the day of the visit. Subjective:   HPI:  Follow up of Hospital problems/diagnosis(es): UroSepsis    Inpatient course: Discharge summary reviewed- see chart.     Interval history/Current status: STABLE    Patient Active Problem List   Diagnosis    History of knee surgery    Mass of thyroid region    Presence of artificial knee joint, right    Anemia in chronic kidney disease    Hypertension    Stage

## 2023-10-10 RX ORDER — ISOSORBIDE MONONITRATE 60 MG/1
30 TABLET, EXTENDED RELEASE ORAL 2 TIMES DAILY
Qty: 90 TABLET | Refills: 1 | Status: SHIPPED | OUTPATIENT
Start: 2023-10-10 | End: 2024-04-07

## 2023-10-10 NOTE — TELEPHONE ENCOUNTER
Patients last appointment 10/5/2023. Patients next scheduled appointment   Future Appointments   Date Time Provider [address]   11/27/2023  8:00 AM SCHEDULE, ERNESTO QUIÑONES [facility]   11/30/2023  9:00 AM Leander Smith Ma, APRN - CNP [facility]   6/4/2024  9:00 AM Leander Smith Ma, APRN - CNP [facility]

## 2023-11-27 ENCOUNTER — NURSE ONLY (OUTPATIENT)
Dept: FAMILY MEDICINE CLINIC | Age: 78
End: 2023-11-27
Payer: MEDICARE

## 2023-11-27 DIAGNOSIS — I10 PRIMARY HYPERTENSION: ICD-10-CM

## 2023-11-27 DIAGNOSIS — E21.3 HYPERPARATHYROIDISM (HCC): ICD-10-CM

## 2023-11-27 DIAGNOSIS — E78.01 FAMILIAL HYPERCHOLESTEROLEMIA: ICD-10-CM

## 2023-11-27 DIAGNOSIS — Z79.4 TYPE 2 DIABETES MELLITUS WITH STAGE 3B CHRONIC KIDNEY DISEASE, WITH LONG-TERM CURRENT USE OF INSULIN (HCC): ICD-10-CM

## 2023-11-27 DIAGNOSIS — N18.32 TYPE 2 DIABETES MELLITUS WITH STAGE 3B CHRONIC KIDNEY DISEASE, WITH LONG-TERM CURRENT USE OF INSULIN (HCC): ICD-10-CM

## 2023-11-27 DIAGNOSIS — E03.9 HYPOTHYROIDISM, UNSPECIFIED TYPE: ICD-10-CM

## 2023-11-27 DIAGNOSIS — I10 PRIMARY HYPERTENSION: Primary | ICD-10-CM

## 2023-11-27 DIAGNOSIS — E11.22 TYPE 2 DIABETES MELLITUS WITH STAGE 3B CHRONIC KIDNEY DISEASE, WITH LONG-TERM CURRENT USE OF INSULIN (HCC): ICD-10-CM

## 2023-11-27 LAB
CHOLESTEROL: 148 MG/DL
HBA1C MFR BLD: 6.4 % (ref 4–5.6)
HDLC SERPL-MCNC: 34 MG/DL
LDL CHOLESTEROL: 70 MG/DL
TRIGL SERPL-MCNC: 219 MG/DL
TSH SERPL DL<=0.05 MIU/L-ACNC: 4.63 UIU/ML (ref 0.27–4.2)
VLDLC SERPL CALC-MCNC: 44 MG/DL

## 2023-11-27 PROCEDURE — 36415 COLL VENOUS BLD VENIPUNCTURE: CPT | Performed by: NURSE PRACTITIONER

## 2023-12-07 ENCOUNTER — OFFICE VISIT (OUTPATIENT)
Dept: FAMILY MEDICINE CLINIC | Age: 78
End: 2023-12-07

## 2023-12-07 VITALS
DIASTOLIC BLOOD PRESSURE: 82 MMHG | HEART RATE: 73 BPM | TEMPERATURE: 98 F | SYSTOLIC BLOOD PRESSURE: 156 MMHG | RESPIRATION RATE: 17 BRPM | HEIGHT: 70 IN | BODY MASS INDEX: 35.5 KG/M2 | OXYGEN SATURATION: 98 % | WEIGHT: 248 LBS

## 2023-12-07 DIAGNOSIS — K57.30 DIVERTICULAR DISEASE OF COLON: ICD-10-CM

## 2023-12-07 DIAGNOSIS — K21.00 GASTROESOPHAGEAL REFLUX DISEASE WITH ESOPHAGITIS WITHOUT HEMORRHAGE: ICD-10-CM

## 2023-12-07 DIAGNOSIS — Z02.5 ENCOUNTER FOR EXAMINATION FOR PARTICIPATION IN SPORT: ICD-10-CM

## 2023-12-07 DIAGNOSIS — I10 PRIMARY HYPERTENSION: ICD-10-CM

## 2023-12-07 DIAGNOSIS — E11.22 TYPE 2 DIABETES MELLITUS WITH STAGE 3B CHRONIC KIDNEY DISEASE, WITH LONG-TERM CURRENT USE OF INSULIN (HCC): ICD-10-CM

## 2023-12-07 DIAGNOSIS — Z79.4 TYPE 2 DIABETES MELLITUS WITH STAGE 3B CHRONIC KIDNEY DISEASE, WITH LONG-TERM CURRENT USE OF INSULIN (HCC): ICD-10-CM

## 2023-12-07 DIAGNOSIS — I65.21 STENOSIS OF RIGHT CAROTID ARTERY: ICD-10-CM

## 2023-12-07 DIAGNOSIS — E21.3 HYPERPARATHYROIDISM (HCC): ICD-10-CM

## 2023-12-07 DIAGNOSIS — N18.32 TYPE 2 DIABETES MELLITUS WITH STAGE 3B CHRONIC KIDNEY DISEASE, WITH LONG-TERM CURRENT USE OF INSULIN (HCC): ICD-10-CM

## 2023-12-07 DIAGNOSIS — E78.01 FAMILIAL HYPERCHOLESTEROLEMIA: Primary | ICD-10-CM

## 2023-12-07 DIAGNOSIS — Z91.89 AT RISK FOR OSTEOPOROSIS IN PREMENOPAUSAL PATIENT: ICD-10-CM

## 2023-12-07 DIAGNOSIS — N95.9 AT RISK FOR OSTEOPOROSIS IN PREMENOPAUSAL PATIENT: ICD-10-CM

## 2023-12-07 DIAGNOSIS — I20.9 ANGINA PECTORIS, UNSPECIFIED (HCC): ICD-10-CM

## 2023-12-07 DIAGNOSIS — Z79.899 LONG TERM USE OF DRUG: ICD-10-CM

## 2023-12-07 DIAGNOSIS — I25.10 ARTERIOSCLEROSIS OF CORONARY ARTERY: ICD-10-CM

## 2023-12-07 DIAGNOSIS — E03.9 HYPOTHYROIDISM, UNSPECIFIED TYPE: ICD-10-CM

## 2023-12-07 RX ORDER — INSULIN DEGLUDEC 200 U/ML
INJECTION, SOLUTION SUBCUTANEOUS
COMMUNITY
Start: 2023-10-23

## 2023-12-07 RX ORDER — PYRIDOXINE HCL (VITAMIN B6) 100 MG
TABLET ORAL
COMMUNITY
Start: 2020-01-03

## 2023-12-07 ASSESSMENT — ENCOUNTER SYMPTOMS
VOICE CHANGE: 0
WHEEZING: 0
ABDOMINAL PAIN: 0
CHEST TIGHTNESS: 0
BLOOD IN STOOL: 0
COUGH: 0
BACK PAIN: 0
CONSTIPATION: 0
VOMITING: 0
DIARRHEA: 0
SHORTNESS OF BREATH: 0
NAUSEA: 0
TROUBLE SWALLOWING: 0

## 2023-12-07 NOTE — PROGRESS NOTES
Deidre Sandhu : 1945 Sex: male  Age: 77 y.o. Chief Complaint   Patient presents with    [address] [place]    Hypertension       Jerardo Melendez was seen today for 6 month follow-up, discuss labs and hypertension. Diagnoses and all orders for this visit:    Familial hypercholesterolemia  -     Comprehensive Metabolic Panel, Fasting; Future  -     Lipid Panel; Future    Hypothyroidism, unspecified type  -     TSH; Future    Hyperparathyroidism ([address])    Primary hypertension  -     CBC with Auto Differential; Future  -     Comprehensive Metabolic Panel, Fasting; Future  -     Urinalysis; Future  -     Urinalysis; Future    Type 2 diabetes mellitus with stage 3b chronic kidney disease, with long-term current use of insulin (HCC)  -     Hemoglobin A1C; Future  -     Microalbumin, Ur; Future  -     Urinalysis; Future    Angina pectoris, unspecified    Long term use of drug    Encounter for examination for participation in sport    At risk for osteoporosis in premenopausal patient    Stenosis of right carotid artery    Gastroesophageal reflux disease with esophagitis without hemorrhage    Arteriosclerosis of coronary artery    Diverticular disease of colon           USPTF:        Lab Results   Component Value Date    LABA1C 6.4 (H) 2023     No results found for: \"EAG\"   Abnormal Blood Glucose and Type 2 Diabetes Mellitus: Screening -- Adults aged 36 to 79 years who are overweight or obese Grade: B (Recommended)    BP Readings from Last 3 Encounters:   23 (!) 156/82   10/05/23 118/62   10/04/23 (!) 144/80     High Blood Pressure: Screening and Home Monitoring -- Adults  Grade: A (Recommended) recommends screening for high blood pressure in ages 25 years or older. obtain measurements outside of the clinical setting for diagnostic confirmation before starting treatment.  Annual screening for adults aged 36 years or older or those who are at increased risk for blood pressure    (  )

## 2024-01-02 RX ORDER — INSULIN DEGLUDEC 200 U/ML
INJECTION, SOLUTION SUBCUTANEOUS
Qty: 1 ADJUSTABLE DOSE PRE-FILLED PEN SYRINGE | Refills: 5 | Status: SHIPPED | OUTPATIENT
Start: 2024-01-02

## 2024-01-02 NOTE — TELEPHONE ENCOUNTER
Patients last appointment 12/7/2023.  Patients next scheduled appointment   Future Appointments   Date Time Provider Department Center   5/30/2024  8:10 AM SCHEDULE, ERNESTO QUIÑONES [facility]   6/4/2024  9:00 AM Teja Smith APRN - CNP [facility]   6/4/2024  9:15 AM Teja Smith APRN - CNP [facility]

## 2024-01-04 ENCOUNTER — CLINICAL DOCUMENTATION ONLY (OUTPATIENT)
Facility: CLINIC | Age: 79
End: 2024-01-04

## 2024-01-18 DIAGNOSIS — N18.32 TYPE 2 DIABETES MELLITUS WITH STAGE 3B CHRONIC KIDNEY DISEASE, WITH LONG-TERM CURRENT USE OF INSULIN (HCC): Primary | ICD-10-CM

## 2024-01-18 DIAGNOSIS — Z79.4 TYPE 2 DIABETES MELLITUS WITH STAGE 3B CHRONIC KIDNEY DISEASE, WITH LONG-TERM CURRENT USE OF INSULIN (HCC): Primary | ICD-10-CM

## 2024-01-18 DIAGNOSIS — E11.22 TYPE 2 DIABETES MELLITUS WITH STAGE 3B CHRONIC KIDNEY DISEASE, WITH LONG-TERM CURRENT USE OF INSULIN (HCC): Primary | ICD-10-CM

## 2024-01-18 RX ORDER — GLUCOSAMINE HCL/CHONDROITIN SU 500-400 MG
CAPSULE ORAL
Qty: 200 STRIP | Refills: 1 | Status: SHIPPED | OUTPATIENT
Start: 2024-01-18

## 2024-01-26 RX ORDER — LEVOTHYROXINE SODIUM 0.05 MG/1
50 TABLET ORAL DAILY
Qty: 90 TABLET | Refills: 0 | Status: SHIPPED | OUTPATIENT
Start: 2024-01-26

## 2024-02-19 NOTE — TELEPHONE ENCOUNTER
Patients last appointment 12/7/2023.  Patients next scheduled appointment   Future Appointments   Date Time Provider Department Center   5/30/2024  8:10 AM SCHEDULE, ERNESTO QUIÑONES [facility]   6/4/2024  9:00 AM Teja Smith APRN - CNP [facility]   6/4/2024  9:15 AM Teja Smith APRN - CNP [facility]

## 2024-02-20 RX ORDER — ROSUVASTATIN CALCIUM 40 MG/1
40 TABLET, COATED ORAL EVERY EVENING
Qty: 90 TABLET | Refills: 1 | Status: SHIPPED | OUTPATIENT
Start: 2024-02-20 | End: 2024-08-18

## 2024-02-29 RX ORDER — AMLODIPINE BESYLATE 5 MG/1
5 TABLET ORAL DAILY
Qty: 90 TABLET | Refills: 1 | Status: SHIPPED | OUTPATIENT
Start: 2024-02-29 | End: 2024-08-27

## 2024-03-01 ENCOUNTER — CLINICAL DOCUMENTATION ONLY (OUTPATIENT)
Facility: CLINIC | Age: 79
End: 2024-03-01

## 2024-04-01 RX ORDER — ISOSORBIDE MONONITRATE 60 MG/1
TABLET, EXTENDED RELEASE ORAL
Qty: 90 TABLET | Refills: 0 | Status: SHIPPED | OUTPATIENT
Start: 2024-04-01

## 2024-04-01 NOTE — TELEPHONE ENCOUNTER
Patients last appointment 12/7/2023.  Patients next scheduled appointment   Future Appointments   Date Time Provider Department Center   5/30/2024  8:10 AM SCHEDULE, ERNESTO QUIÑONES [facility]   6/4/2024  9:00 AM Teja Smith APRN - CNP [facility]   6/4/2024  9:15 AM Teja Smith APRN - CNP [facility]

## 2024-04-02 LAB — DIABETIC RETINOPATHY: NEGATIVE

## 2024-04-29 ENCOUNTER — COMMUNITY CARE MANAGEMENT (OUTPATIENT)
Facility: CLINIC | Age: 79
End: 2024-04-29

## 2024-05-03 RX ORDER — LEVOTHYROXINE SODIUM 0.05 MG/1
50 TABLET ORAL DAILY
Qty: 90 TABLET | Refills: 1 | Status: SHIPPED | OUTPATIENT
Start: 2024-05-03

## 2024-05-22 RX ORDER — TAMSULOSIN HYDROCHLORIDE 0.4 MG/1
0.4 CAPSULE ORAL 2 TIMES DAILY
Qty: 180 CAPSULE | Refills: 1 | Status: SHIPPED | OUTPATIENT
Start: 2024-05-22

## 2024-05-22 RX ORDER — CARVEDILOL 25 MG/1
25 TABLET ORAL 2 TIMES DAILY WITH MEALS
Qty: 180 TABLET | Refills: 1 | Status: SHIPPED | OUTPATIENT
Start: 2024-05-22 | End: 2024-11-18

## 2024-05-22 RX ORDER — ISOSORBIDE MONONITRATE 60 MG/1
30 TABLET, EXTENDED RELEASE ORAL 2 TIMES DAILY
Qty: 90 TABLET | Refills: 1 | Status: SHIPPED | OUTPATIENT
Start: 2024-05-22

## 2024-05-22 RX ORDER — ROSUVASTATIN CALCIUM 40 MG/1
40 TABLET, COATED ORAL EVERY EVENING
Qty: 90 TABLET | Refills: 1 | Status: SHIPPED | OUTPATIENT
Start: 2024-05-22 | End: 2024-11-18

## 2024-05-22 RX ORDER — RANOLAZINE 1000 MG/1
1000 TABLET, EXTENDED RELEASE ORAL 2 TIMES DAILY
Qty: 180 TABLET | Refills: 1 | Status: SHIPPED | OUTPATIENT
Start: 2024-05-22

## 2024-05-30 ENCOUNTER — NURSE ONLY (OUTPATIENT)
Dept: FAMILY MEDICINE CLINIC | Age: 79
End: 2024-05-30
Payer: MEDICARE

## 2024-05-30 DIAGNOSIS — Z79.4 TYPE 2 DIABETES MELLITUS WITH STAGE 3B CHRONIC KIDNEY DISEASE, WITH LONG-TERM CURRENT USE OF INSULIN (HCC): ICD-10-CM

## 2024-05-30 DIAGNOSIS — N18.32 TYPE 2 DIABETES MELLITUS WITH STAGE 3B CHRONIC KIDNEY DISEASE, WITH LONG-TERM CURRENT USE OF INSULIN (HCC): ICD-10-CM

## 2024-05-30 DIAGNOSIS — E78.01 FAMILIAL HYPERCHOLESTEROLEMIA: ICD-10-CM

## 2024-05-30 DIAGNOSIS — E03.9 HYPOTHYROIDISM, UNSPECIFIED TYPE: ICD-10-CM

## 2024-05-30 DIAGNOSIS — I10 PRIMARY HYPERTENSION: Primary | ICD-10-CM

## 2024-05-30 DIAGNOSIS — E11.22 TYPE 2 DIABETES MELLITUS WITH STAGE 3B CHRONIC KIDNEY DISEASE, WITH LONG-TERM CURRENT USE OF INSULIN (HCC): ICD-10-CM

## 2024-05-30 LAB
CHOLESTEROL, TOTAL: 128 MG/DL
HBA1C MFR BLD: 6.8 % (ref 4–5.6)
HDLC SERPL-MCNC: 38 MG/DL
LDL CHOLESTEROL: 61 MG/DL
TRIGL SERPL-MCNC: 143 MG/DL
TSH SERPL DL<=0.05 MIU/L-ACNC: 3.8 UIU/ML (ref 0.27–4.2)
VLDLC SERPL CALC-MCNC: 29 MG/DL

## 2024-05-30 PROCEDURE — 36415 COLL VENOUS BLD VENIPUNCTURE: CPT | Performed by: NURSE PRACTITIONER

## 2024-06-04 ENCOUNTER — OFFICE VISIT (OUTPATIENT)
Dept: FAMILY MEDICINE CLINIC | Age: 79
End: 2024-06-04
Payer: MEDICARE

## 2024-06-04 VITALS
HEART RATE: 85 BPM | RESPIRATION RATE: 18 BRPM | TEMPERATURE: 98 F | WEIGHT: 250 LBS | SYSTOLIC BLOOD PRESSURE: 140 MMHG | OXYGEN SATURATION: 98 % | HEIGHT: 70 IN | DIASTOLIC BLOOD PRESSURE: 65 MMHG | BODY MASS INDEX: 35.79 KG/M2

## 2024-06-04 VITALS
OXYGEN SATURATION: 98 % | DIASTOLIC BLOOD PRESSURE: 65 MMHG | BODY MASS INDEX: 35.79 KG/M2 | RESPIRATION RATE: 18 BRPM | SYSTOLIC BLOOD PRESSURE: 140 MMHG | WEIGHT: 250 LBS | TEMPERATURE: 98 F | HEART RATE: 85 BPM | HEIGHT: 70 IN

## 2024-06-04 DIAGNOSIS — I10 PRIMARY HYPERTENSION: ICD-10-CM

## 2024-06-04 DIAGNOSIS — E66.01 SEVERE OBESITY (BMI 35.0-39.9) WITH COMORBIDITY (HCC): ICD-10-CM

## 2024-06-04 DIAGNOSIS — I25.10 CORONARY ARTERY DISEASE INVOLVING NATIVE CORONARY ARTERY OF NATIVE HEART WITHOUT ANGINA PECTORIS: ICD-10-CM

## 2024-06-04 DIAGNOSIS — I20.9 ANGINA PECTORIS, UNSPECIFIED (HCC): ICD-10-CM

## 2024-06-04 DIAGNOSIS — I65.21 STENOSIS OF RIGHT CAROTID ARTERY: ICD-10-CM

## 2024-06-04 DIAGNOSIS — I11.0 HYPERTENSIVE HEART DISEASE WITH HEART FAILURE (HCC): ICD-10-CM

## 2024-06-04 DIAGNOSIS — Z79.4 TYPE 2 DIABETES MELLITUS WITH STAGE 3B CHRONIC KIDNEY DISEASE, WITH LONG-TERM CURRENT USE OF INSULIN (HCC): ICD-10-CM

## 2024-06-04 DIAGNOSIS — Z79.899 LONG TERM USE OF DRUG: ICD-10-CM

## 2024-06-04 DIAGNOSIS — K57.30 DIVERTICULAR DISEASE OF COLON: ICD-10-CM

## 2024-06-04 DIAGNOSIS — C61 MALIGNANT NEOPLASM OF PROSTATE (HCC): ICD-10-CM

## 2024-06-04 DIAGNOSIS — E21.3 HYPERPARATHYROIDISM (HCC): ICD-10-CM

## 2024-06-04 DIAGNOSIS — I10 PRIMARY HYPERTENSION: Primary | ICD-10-CM

## 2024-06-04 DIAGNOSIS — N18.32 TYPE 2 DIABETES MELLITUS WITH STAGE 3B CHRONIC KIDNEY DISEASE, WITH LONG-TERM CURRENT USE OF INSULIN (HCC): ICD-10-CM

## 2024-06-04 DIAGNOSIS — Z00.00 MEDICARE ANNUAL WELLNESS VISIT, SUBSEQUENT: Primary | ICD-10-CM

## 2024-06-04 DIAGNOSIS — G47.33 OSA (OBSTRUCTIVE SLEEP APNEA): ICD-10-CM

## 2024-06-04 DIAGNOSIS — E11.22 TYPE 2 DIABETES MELLITUS WITH STAGE 3B CHRONIC KIDNEY DISEASE, WITH LONG-TERM CURRENT USE OF INSULIN (HCC): ICD-10-CM

## 2024-06-04 DIAGNOSIS — E78.01 FAMILIAL HYPERCHOLESTEROLEMIA: ICD-10-CM

## 2024-06-04 PROCEDURE — G0439 PPPS, SUBSEQ VISIT: HCPCS | Performed by: NURSE PRACTITIONER

## 2024-06-04 PROCEDURE — 1123F ACP DISCUSS/DSCN MKR DOCD: CPT | Performed by: NURSE PRACTITIONER

## 2024-06-04 PROCEDURE — 3077F SYST BP >= 140 MM HG: CPT | Performed by: NURSE PRACTITIONER

## 2024-06-04 PROCEDURE — 1036F TOBACCO NON-USER: CPT | Performed by: NURSE PRACTITIONER

## 2024-06-04 PROCEDURE — G8417 CALC BMI ABV UP PARAM F/U: HCPCS | Performed by: NURSE PRACTITIONER

## 2024-06-04 PROCEDURE — 99214 OFFICE O/P EST MOD 30 MIN: CPT | Performed by: NURSE PRACTITIONER

## 2024-06-04 PROCEDURE — 3044F HG A1C LEVEL LT 7.0%: CPT | Performed by: NURSE PRACTITIONER

## 2024-06-04 PROCEDURE — G8427 DOCREV CUR MEDS BY ELIG CLIN: HCPCS | Performed by: NURSE PRACTITIONER

## 2024-06-04 PROCEDURE — 3078F DIAST BP <80 MM HG: CPT | Performed by: NURSE PRACTITIONER

## 2024-06-04 RX ORDER — CARVEDILOL 25 MG/1
25 TABLET ORAL 2 TIMES DAILY WITH MEALS
Qty: 180 TABLET | Refills: 1 | Status: SHIPPED | OUTPATIENT
Start: 2024-06-04 | End: 2024-12-01

## 2024-06-04 RX ORDER — ISOSORBIDE MONONITRATE 60 MG/1
30 TABLET, EXTENDED RELEASE ORAL 2 TIMES DAILY
Qty: 90 TABLET | Refills: 1 | Status: SHIPPED | OUTPATIENT
Start: 2024-06-04

## 2024-06-04 SDOH — ECONOMIC STABILITY: FOOD INSECURITY: WITHIN THE PAST 12 MONTHS, THE FOOD YOU BOUGHT JUST DIDN'T LAST AND YOU DIDN'T HAVE MONEY TO GET MORE.: NEVER TRUE

## 2024-06-04 SDOH — ECONOMIC STABILITY: FOOD INSECURITY: WITHIN THE PAST 12 MONTHS, YOU WORRIED THAT YOUR FOOD WOULD RUN OUT BEFORE YOU GOT MONEY TO BUY MORE.: NEVER TRUE

## 2024-06-04 SDOH — ECONOMIC STABILITY: INCOME INSECURITY: HOW HARD IS IT FOR YOU TO PAY FOR THE VERY BASICS LIKE FOOD, HOUSING, MEDICAL CARE, AND HEATING?: NOT HARD AT ALL

## 2024-06-04 ASSESSMENT — ENCOUNTER SYMPTOMS
DIARRHEA: 0
CHEST TIGHTNESS: 0
BLOOD IN STOOL: 0
SHORTNESS OF BREATH: 0
BACK PAIN: 0
ABDOMINAL PAIN: 0
COUGH: 0
TROUBLE SWALLOWING: 0
WHEEZING: 0
CONSTIPATION: 0
NAUSEA: 0
VOICE CHANGE: 0
VOMITING: 0

## 2024-06-04 ASSESSMENT — PATIENT HEALTH QUESTIONNAIRE - PHQ9
SUM OF ALL RESPONSES TO PHQ QUESTIONS 1-9: 0
SUM OF ALL RESPONSES TO PHQ QUESTIONS 1-9: 0
2. FEELING DOWN, DEPRESSED OR HOPELESS: NOT AT ALL
SUM OF ALL RESPONSES TO PHQ QUESTIONS 1-9: 0
SUM OF ALL RESPONSES TO PHQ9 QUESTIONS 1 & 2: 0
1. LITTLE INTEREST OR PLEASURE IN DOING THINGS: NOT AT ALL
SUM OF ALL RESPONSES TO PHQ QUESTIONS 1-9: 0

## 2024-06-04 NOTE — PROGRESS NOTES
Medicare Annual Wellness Visit    Aamir Garcia is here for Medicare AWV (Awv today )    Assessment & Plan   Medicare annual wellness visit, subsequent  Primary hypertension  Severe obesity (BMI 35.0-39.9) with comorbidity (HCC)    Recommendations for Preventive Services Due: see orders and patient instructions/AVS.  Recommended screening schedule for the next 5-10 years is provided to the patient in written form: see Patient Instructions/AVS.     Return for Medicare Annual Wellness Visit in 1 year.     Subjective   The following acute and/or chronic problems were also addressed today:  fatigue    Patient's complete Health Risk Assessment and screening values have been reviewed and are found in Flowsheets. The following problems were reviewed today and where indicated follow up appointments were made and/or referrals ordered.    Positive Risk Factor Screenings with Interventions:                Activity, Diet, and Weight:  On average, how many days per week do you engage in moderate to strenuous exercise (like a brisk walk)?: 5 days  On average, how many minutes do you engage in exercise at this level?: 10 min    Do you eat balanced/healthy meals regularly?: Yes    Body mass index is 35.87 kg/m². (!) Abnormal    Obesity Interventions:  See AVS for additional education material    Obesity Counseling: Patient was asked about his current diet and exercise habits, and personalized advice was provided regarding recommended lifestyle changes. Patient's comorbid health conditions associated with elevated BMI were discussed, as well as the likely benefits of weight loss. Based upon patient's motivation to change his behavior, the following plan was agreed upon to work toward a weight loss goal of 25 pounds: lower carbohydrate diet. Educational materials for weight loss were provided.  Patient will follow-up in 6 month(s) with PCP. Time spent counseling patient: 3 minutes        Dentist Screen:  Have you seen the dentist

## 2024-06-04 NOTE — PROGRESS NOTES
Aamir Garcia : 1945 Sex: male  Age: 79 y.o.    Chief Complaint   Patient presents with    Discuss Labs     Lab review ,3 month check up       Aamir was seen today for discuss labs.    Diagnoses and all orders for this visit:    Primary hypertension    Hypertensive heart disease with heart failure (HCC)    Hyperparathyroidism (HCC)    Malignant neoplasm of prostate (HCC)    Type 2 diabetes mellitus with stage 3b chronic kidney disease, with long-term current use of insulin (HCC)    Angina pectoris, unspecified (HCC)    Coronary artery disease involving native coronary artery of native heart without angina pectoris    Familial hypercholesterolemia    Long term use of drug    Stenosis of right carotid artery    Diverticular disease of colon    JOEL (obstructive sleep apnea)    Other orders  -     isosorbide mononitrate (IMDUR) 60 MG extended release tablet; Take 0.5 tablets by mouth 2 times daily  -     carvedilol (COREG) 25 MG tablet; Take 1 tablet by mouth 2 times daily (with meals)            Lab Results   Component Value Date    LABA1C 6.8 (H) 2024     No results found for: \"EAG\"   Abnormal Blood Glucose and Type 2 Diabetes Mellitus: Screening -- Adults aged 40 to 70 years who are overweight or obese Grade: B (Recommended)    BP Readings from Last 3 Encounters:   24 (!) 140/65   24 (!) 140/65   23 (!) 156/82     High Blood Pressure: Screening and Home Monitoring -- Adults  Grade: A (Recommended) recommends screening for high blood pressure in ages 18 years or older.   obtain measurements outside of the clinical setting for diagnostic confirmation before starting treatment. Annual screening for adults aged 40 years or older or those who are at increased risk for blood pressure    (  ) Colorectal Cancer: Screening --Adults aged 50 to 75 years  Grade: A (Recommended) recommends screening for colorectal cancer starting at age 50 years and continuing until age 75 years.     Lab

## 2024-06-04 NOTE — PATIENT INSTRUCTIONS

## 2024-07-11 RX ORDER — INSULIN DEGLUDEC 200 U/ML
INJECTION, SOLUTION SUBCUTANEOUS
Qty: 1 ADJUSTABLE DOSE PRE-FILLED PEN SYRINGE | Refills: 5 | Status: SHIPPED | OUTPATIENT
Start: 2024-07-11

## 2024-07-11 NOTE — TELEPHONE ENCOUNTER
Patients last appointment 6/4/2024.  Patients next scheduled appointment   Future Appointments   Date Time Provider Department Center   12/3/2024  8:10 AM SCHEDULE, ERNESTO QUIÑONES [facility]   12/10/2024  9:45 AM Teja Smith APRN - CNP [facility]   6/10/2025  9:00 AM Teja Smith APRN - CNP [facility]

## 2024-07-12 RX ORDER — RANOLAZINE 1000 MG/1
1000 TABLET, EXTENDED RELEASE ORAL 2 TIMES DAILY
Qty: 180 TABLET | Refills: 1 | Status: CANCELLED | OUTPATIENT
Start: 2024-07-12

## 2024-07-23 DIAGNOSIS — N18.32 TYPE 2 DIABETES MELLITUS WITH STAGE 3B CHRONIC KIDNEY DISEASE, WITH LONG-TERM CURRENT USE OF INSULIN (HCC): ICD-10-CM

## 2024-07-23 DIAGNOSIS — E11.22 TYPE 2 DIABETES MELLITUS WITH STAGE 3B CHRONIC KIDNEY DISEASE, WITH LONG-TERM CURRENT USE OF INSULIN (HCC): ICD-10-CM

## 2024-07-23 DIAGNOSIS — Z79.4 TYPE 2 DIABETES MELLITUS WITH STAGE 3B CHRONIC KIDNEY DISEASE, WITH LONG-TERM CURRENT USE OF INSULIN (HCC): ICD-10-CM

## 2024-07-23 RX ORDER — BLOOD SUGAR DIAGNOSTIC
STRIP MISCELLANEOUS
Qty: 200 EACH | Refills: 0 | Status: SHIPPED | OUTPATIENT
Start: 2024-07-23

## 2024-08-26 RX ORDER — AMLODIPINE BESYLATE 5 MG/1
5 TABLET ORAL DAILY
Qty: 90 TABLET | Refills: 0 | Status: SHIPPED | OUTPATIENT
Start: 2024-08-26

## 2024-10-07 DIAGNOSIS — N18.32 TYPE 2 DIABETES MELLITUS WITH STAGE 3B CHRONIC KIDNEY DISEASE, WITH LONG-TERM CURRENT USE OF INSULIN (HCC): ICD-10-CM

## 2024-10-07 DIAGNOSIS — E11.22 TYPE 2 DIABETES MELLITUS WITH STAGE 3B CHRONIC KIDNEY DISEASE, WITH LONG-TERM CURRENT USE OF INSULIN (HCC): ICD-10-CM

## 2024-10-07 DIAGNOSIS — Z79.4 TYPE 2 DIABETES MELLITUS WITH STAGE 3B CHRONIC KIDNEY DISEASE, WITH LONG-TERM CURRENT USE OF INSULIN (HCC): ICD-10-CM

## 2024-10-07 RX ORDER — BLOOD SUGAR DIAGNOSTIC
STRIP MISCELLANEOUS
Qty: 200 EACH | Refills: 0 | Status: SHIPPED | OUTPATIENT
Start: 2024-10-07

## 2024-10-07 NOTE — TELEPHONE ENCOUNTER
Refill request    Thank you    Last Appointment:  6/4/2024  Future Appointments   Date Time Provider Department Center   12/3/2024  8:10 AM SCHEDULE, ERNESTO QUIÑONES [facility]. [facility]   12/10/2024  9:45 AM Teja Smith APRN - CNP [facility]   6/10/2025  9:00 AM Teja Smith APRN - CNP [facility]

## 2024-10-09 RX ORDER — INSULIN DEGLUDEC 200 U/ML
INJECTION, SOLUTION SUBCUTANEOUS
Qty: 1 ADJUSTABLE DOSE PRE-FILLED PEN SYRINGE | Refills: 5 | Status: SHIPPED | OUTPATIENT
Start: 2024-10-09

## 2024-10-22 RX ORDER — LEVOTHYROXINE SODIUM 50 UG/1
50 TABLET ORAL DAILY
Qty: 90 TABLET | Refills: 1 | Status: SHIPPED | OUTPATIENT
Start: 2024-10-22

## 2024-10-22 NOTE — TELEPHONE ENCOUNTER
Name of Medication(s) Requested:  Requested Prescriptions     Pending Prescriptions Disp Refills    levothyroxine (SYNTHROID) 50 MCG tablet [Pharmacy Med Name: Levothyroxine Sodium 50 MCG Oral Tablet] 90 tablet 1     Sig: Take 1 tablet by mouth once daily       Medication is on current medication list Yes    Dosage and directions were verified? Yes    Quantity verified: 90 day supply     Pharmacy Verified?  Yes    Last Appointment:  6/4/2024    Future appts:  Future Appointments   Date Time Provider Department Center   12/3/2024  8:10 AM SCHEDULE, ERNESTO HOFFMAN [facility]   12/10/2024  9:45 AM Teja Smith APRN - CNP E. [facility]   6/10/2025  9:00 AM Teja Smith APRN - CNP E. [facility]        (If no appt send self scheduling link. .REFILLAPPT)  Scheduling request sent?     [] Yes  [x] No    Does patient need updated?  [] Yes  [x] No

## 2024-11-13 DIAGNOSIS — N18.2 TYPE 2 DIABETES MELLITUS WITH STAGE 2 CHRONIC KIDNEY DISEASE, WITHOUT LONG-TERM CURRENT USE OF INSULIN (HCC): ICD-10-CM

## 2024-11-13 DIAGNOSIS — E11.22 TYPE 2 DIABETES MELLITUS WITH STAGE 2 CHRONIC KIDNEY DISEASE, WITHOUT LONG-TERM CURRENT USE OF INSULIN (HCC): ICD-10-CM

## 2024-11-13 DIAGNOSIS — E78.01 FAMILIAL HYPERCHOLESTEROLEMIA: ICD-10-CM

## 2024-11-13 DIAGNOSIS — I10 PRIMARY HYPERTENSION: Primary | ICD-10-CM

## 2024-11-13 DIAGNOSIS — E03.9 HYPOTHYROIDISM, UNSPECIFIED TYPE: ICD-10-CM

## 2024-11-13 DIAGNOSIS — E21.3 HYPERPARATHYROIDISM (HCC): ICD-10-CM

## 2024-11-18 RX ORDER — SODIUM BICARBONATE 650 MG/1
650 TABLET ORAL DAILY
Qty: 90 TABLET | Refills: 1 | Status: SHIPPED | OUTPATIENT
Start: 2024-11-18

## 2024-11-18 RX ORDER — RANOLAZINE 1000 MG/1
1000 TABLET, EXTENDED RELEASE ORAL 2 TIMES DAILY
Qty: 180 TABLET | Refills: 1 | Status: SHIPPED | OUTPATIENT
Start: 2024-11-18

## 2024-11-18 NOTE — TELEPHONE ENCOUNTER
Name of Medication(s) Requested:  Requested Prescriptions     Pending Prescriptions Disp Refills    sodium bicarbonate 650 MG tablet 90 tablet 1     Sig: Take 1 tablet by mouth daily    ranolazine (RANEXA) 1000 MG extended release tablet 180 tablet 1     Sig: Take 1 tablet by mouth 2 times daily       Medication is on current medication list Yes    Dosage and directions were verified? Yes    Quantity verified: 90 day supply     Pharmacy Verified?  Yes    Last Appointment:  6/4/2024    Future appts:  Future Appointments   Date Time Provider Department Center   12/3/2024  8:10 AM SCHEDULE, ERNESTO HOFFMAN [facility]   12/10/2024  9:45 AM Teja Smith APRN - CNP E. [facility]   6/10/2025  9:00 AM Teja Smith APRN - CNP E. [facility]        (If no appt send self scheduling link. .REFILLAPPT)  Scheduling request sent?     [] Yes  [x] No    Does patient need updated?  [] Yes  [x] No

## 2024-11-21 RX ORDER — AMLODIPINE BESYLATE 5 MG/1
5 TABLET ORAL DAILY
Qty: 90 TABLET | Refills: 1 | Status: SHIPPED | OUTPATIENT
Start: 2024-11-21

## 2024-11-21 NOTE — TELEPHONE ENCOUNTER
Name of Medication(s) Requested:  Requested Prescriptions     Pending Prescriptions Disp Refills    amLODIPine (NORVASC) 5 MG tablet [Pharmacy Med Name: amLODIPine Besylate 5 MG Oral Tablet] 90 tablet 1     Sig: Take 1 tablet by mouth once daily       Medication is on current medication list Yes    Dosage and directions were verified? Yes    Quantity verified: 90 day supply     Pharmacy Verified?  Yes    Last Appointment:  6/4/2024    Future appts:  Future Appointments   Date Time Provider Department Center   12/3/2024  8:10 AM SCHEDULE, ERNESTO RUSH  E. [facility]   12/10/2024  9:45 AM Teja Smith APRN - CNP E. [facility]   6/10/2025  9:00 AM Teja Smith APRN - CNP [facility]        (If no appt send self scheduling link. .REFILLAPPT)  Scheduling request sent?     [] Yes  [x] No    Does patient need updated?  [] Yes  [x] No

## 2024-11-24 PROCEDURE — 77014 CHG CT GUIDANCE RADIATION THERAPY FLDS PLACEMENT: CPT | Performed by: RADIOLOGY

## 2024-11-25 PROCEDURE — 77014 CHG CT GUIDANCE RADIATION THERAPY FLDS PLACEMENT: CPT | Performed by: RADIOLOGY

## 2024-11-26 PROCEDURE — 77014 CHG CT GUIDANCE RADIATION THERAPY FLDS PLACEMENT: CPT | Performed by: RADIOLOGY

## 2024-11-27 PROCEDURE — 77014 CHG CT GUIDANCE RADIATION THERAPY FLDS PLACEMENT: CPT | Performed by: RADIOLOGY

## 2024-12-03 ENCOUNTER — LAB (OUTPATIENT)
Dept: FAMILY MEDICINE CLINIC | Age: 79
End: 2024-12-03
Payer: MEDICARE

## 2024-12-03 DIAGNOSIS — E78.01 FAMILIAL HYPERCHOLESTEROLEMIA: ICD-10-CM

## 2024-12-03 DIAGNOSIS — N18.2 TYPE 2 DIABETES MELLITUS WITH STAGE 2 CHRONIC KIDNEY DISEASE, WITHOUT LONG-TERM CURRENT USE OF INSULIN (HCC): ICD-10-CM

## 2024-12-03 DIAGNOSIS — E11.22 TYPE 2 DIABETES MELLITUS WITH STAGE 2 CHRONIC KIDNEY DISEASE, WITHOUT LONG-TERM CURRENT USE OF INSULIN (HCC): ICD-10-CM

## 2024-12-03 DIAGNOSIS — E03.9 HYPOTHYROIDISM, UNSPECIFIED TYPE: ICD-10-CM

## 2024-12-03 DIAGNOSIS — I10 PRIMARY HYPERTENSION: ICD-10-CM

## 2024-12-03 LAB
ALBUMIN: 3.1 G/DL (ref 3.5–5.2)
ALP BLD-CCNC: 84 U/L (ref 40–129)
ALT SERPL-CCNC: 14 U/L (ref 0–40)
ANION GAP SERPL CALCULATED.3IONS-SCNC: 11 MMOL/L (ref 7–16)
AST SERPL-CCNC: 21 U/L (ref 0–39)
BACTERIA: ABNORMAL
BASOPHILS ABSOLUTE: 0.03 K/UL (ref 0–0.2)
BASOPHILS RELATIVE PERCENT: 1 % (ref 0–2)
BILIRUB SERPL-MCNC: 0.4 MG/DL (ref 0–1.2)
BILIRUBIN, URINE: NEGATIVE
BUN BLDV-MCNC: 19 MG/DL (ref 6–23)
CALCIUM SERPL-MCNC: 8.9 MG/DL (ref 8.6–10.2)
CHLORIDE BLD-SCNC: 99 MMOL/L (ref 98–107)
CHOLESTEROL, TOTAL: 137 MG/DL
CO2: 27 MMOL/L (ref 22–29)
COLOR, UA: ABNORMAL
CREAT SERPL-MCNC: 1.8 MG/DL (ref 0.7–1.2)
CREATININE URINE: 161.8 MG/DL (ref 40–278)
EOSINOPHILS ABSOLUTE: 0.18 K/UL (ref 0.05–0.5)
EOSINOPHILS RELATIVE PERCENT: 3 % (ref 0–6)
GFR, ESTIMATED: 38 ML/MIN/1.73M2
GLUCOSE FASTING: 133 MG/DL (ref 74–99)
GLUCOSE URINE: NEGATIVE MG/DL
HBA1C MFR BLD: 6.6 % (ref 4–5.6)
HCT VFR BLD CALC: 33.8 % (ref 37–54)
HDLC SERPL-MCNC: 35 MG/DL
HEMOGLOBIN: 10.5 G/DL (ref 12.5–16.5)
IMMATURE GRANULOCYTES %: 0 % (ref 0–5)
IMMATURE GRANULOCYTES ABSOLUTE: <0.03 K/UL (ref 0–0.58)
KETONES, URINE: NEGATIVE MG/DL
LDL CHOLESTEROL: 65 MG/DL
LEUKOCYTE ESTERASE, URINE: NEGATIVE
LYMPHOCYTES ABSOLUTE: 0.49 K/UL (ref 1.5–4)
LYMPHOCYTES RELATIVE PERCENT: 7 % (ref 20–42)
MCH RBC QN AUTO: 29.8 PG (ref 26–35)
MCHC RBC AUTO-ENTMCNC: 31.1 G/DL (ref 32–34.5)
MCV RBC AUTO: 96 FL (ref 80–99.9)
MICROALBUMIN/CREAT 24H UR: 1529 MG/L (ref 0–19)
MICROALBUMIN/CREAT UR-RTO: 945 MCG/MG CREAT (ref 0–30)
MONOCYTES ABSOLUTE: 0.47 K/UL (ref 0.1–0.95)
MONOCYTES RELATIVE PERCENT: 7 % (ref 2–12)
NEUTROPHILS ABSOLUTE: 5.42 K/UL (ref 1.8–7.3)
NEUTROPHILS RELATIVE PERCENT: 82 % (ref 43–80)
NITRITE, URINE: NEGATIVE
PDW BLD-RTO: 13.8 % (ref 11.5–15)
PH, URINE: 7.5 (ref 5–9)
PLATELET # BLD: 176 K/UL (ref 130–450)
PMV BLD AUTO: 11.3 FL (ref 7–12)
POTASSIUM SERPL-SCNC: 4.6 MMOL/L (ref 3.5–5)
PROTEIN UA: >=300 MG/DL
RBC # BLD: 3.52 M/UL (ref 3.8–5.8)
RBC # BLD: ABNORMAL 10*6/UL
RBC UA: ABNORMAL /HPF
SODIUM BLD-SCNC: 137 MMOL/L (ref 132–146)
SPECIFIC GRAVITY UA: 1.02 (ref 1–1.03)
TOTAL PROTEIN: 6.6 G/DL (ref 6.4–8.3)
TRIGL SERPL-MCNC: 185 MG/DL
TSH SERPL DL<=0.05 MIU/L-ACNC: 4.27 UIU/ML (ref 0.27–4.2)
TURBIDITY: CLEAR
URINE HGB: NEGATIVE
UROBILINOGEN, URINE: 0.2 EU/DL (ref 0–1)
VLDLC SERPL CALC-MCNC: 37 MG/DL
WBC # BLD: 6.6 K/UL (ref 4.5–11.5)
WBC UA: ABNORMAL /HPF

## 2024-12-03 PROCEDURE — 36415 COLL VENOUS BLD VENIPUNCTURE: CPT | Performed by: NURSE PRACTITIONER

## 2024-12-10 ENCOUNTER — OFFICE VISIT (OUTPATIENT)
Dept: FAMILY MEDICINE CLINIC | Age: 79
End: 2024-12-10
Payer: MEDICARE

## 2024-12-10 VITALS
OXYGEN SATURATION: 95 % | RESPIRATION RATE: 16 BRPM | BODY MASS INDEX: 35.65 KG/M2 | DIASTOLIC BLOOD PRESSURE: 72 MMHG | SYSTOLIC BLOOD PRESSURE: 132 MMHG | WEIGHT: 249 LBS | TEMPERATURE: 98 F | HEART RATE: 78 BPM | HEIGHT: 70 IN

## 2024-12-10 DIAGNOSIS — I25.10 CORONARY ARTERY DISEASE INVOLVING NATIVE CORONARY ARTERY OF NATIVE HEART WITHOUT ANGINA PECTORIS: ICD-10-CM

## 2024-12-10 DIAGNOSIS — N18.32 TYPE 2 DIABETES MELLITUS WITH STAGE 3B CHRONIC KIDNEY DISEASE, WITH LONG-TERM CURRENT USE OF INSULIN (HCC): ICD-10-CM

## 2024-12-10 DIAGNOSIS — E11.22 TYPE 2 DIABETES MELLITUS WITH STAGE 3B CHRONIC KIDNEY DISEASE, WITH LONG-TERM CURRENT USE OF INSULIN (HCC): ICD-10-CM

## 2024-12-10 DIAGNOSIS — R05.3 CHRONIC COUGH: Primary | ICD-10-CM

## 2024-12-10 DIAGNOSIS — E03.9 HYPOTHYROIDISM, UNSPECIFIED TYPE: ICD-10-CM

## 2024-12-10 DIAGNOSIS — G47.33 OSA (OBSTRUCTIVE SLEEP APNEA): ICD-10-CM

## 2024-12-10 DIAGNOSIS — Z79.4 TYPE 2 DIABETES MELLITUS WITH STAGE 3B CHRONIC KIDNEY DISEASE, WITH LONG-TERM CURRENT USE OF INSULIN (HCC): ICD-10-CM

## 2024-12-10 DIAGNOSIS — C61 MALIGNANT NEOPLASM OF PROSTATE (HCC): ICD-10-CM

## 2024-12-10 DIAGNOSIS — E21.3 HYPERPARATHYROIDISM (HCC): ICD-10-CM

## 2024-12-10 DIAGNOSIS — I11.0 HYPERTENSIVE HEART DISEASE WITH HEART FAILURE (HCC): ICD-10-CM

## 2024-12-10 DIAGNOSIS — N18.31 STAGE 3A CHRONIC KIDNEY DISEASE (HCC): ICD-10-CM

## 2024-12-10 PROCEDURE — 99214 OFFICE O/P EST MOD 30 MIN: CPT | Performed by: NURSE PRACTITIONER

## 2024-12-10 PROCEDURE — 1036F TOBACCO NON-USER: CPT | Performed by: NURSE PRACTITIONER

## 2024-12-10 PROCEDURE — 3044F HG A1C LEVEL LT 7.0%: CPT | Performed by: NURSE PRACTITIONER

## 2024-12-10 PROCEDURE — 1159F MED LIST DOCD IN RCRD: CPT | Performed by: NURSE PRACTITIONER

## 2024-12-10 PROCEDURE — G8417 CALC BMI ABV UP PARAM F/U: HCPCS | Performed by: NURSE PRACTITIONER

## 2024-12-10 PROCEDURE — 1123F ACP DISCUSS/DSCN MKR DOCD: CPT | Performed by: NURSE PRACTITIONER

## 2024-12-10 PROCEDURE — G8482 FLU IMMUNIZE ORDER/ADMIN: HCPCS | Performed by: NURSE PRACTITIONER

## 2024-12-10 PROCEDURE — 1160F RVW MEDS BY RX/DR IN RCRD: CPT | Performed by: NURSE PRACTITIONER

## 2024-12-10 PROCEDURE — 3075F SYST BP GE 130 - 139MM HG: CPT | Performed by: NURSE PRACTITIONER

## 2024-12-10 PROCEDURE — 3078F DIAST BP <80 MM HG: CPT | Performed by: NURSE PRACTITIONER

## 2024-12-10 PROCEDURE — G8427 DOCREV CUR MEDS BY ELIG CLIN: HCPCS | Performed by: NURSE PRACTITIONER

## 2024-12-10 RX ORDER — FLUTICASONE PROPIONATE 50 MCG
1 SPRAY, SUSPENSION (ML) NASAL DAILY
Qty: 16 G | Refills: 0 | Status: SHIPPED | OUTPATIENT
Start: 2024-12-10

## 2024-12-10 SDOH — ECONOMIC STABILITY: FOOD INSECURITY: WITHIN THE PAST 12 MONTHS, YOU WORRIED THAT YOUR FOOD WOULD RUN OUT BEFORE YOU GOT MONEY TO BUY MORE.: NEVER TRUE

## 2024-12-10 SDOH — ECONOMIC STABILITY: FOOD INSECURITY: WITHIN THE PAST 12 MONTHS, THE FOOD YOU BOUGHT JUST DIDN'T LAST AND YOU DIDN'T HAVE MONEY TO GET MORE.: NEVER TRUE

## 2024-12-10 SDOH — ECONOMIC STABILITY: INCOME INSECURITY: HOW HARD IS IT FOR YOU TO PAY FOR THE VERY BASICS LIKE FOOD, HOUSING, MEDICAL CARE, AND HEATING?: NOT HARD AT ALL

## 2024-12-10 ASSESSMENT — ENCOUNTER SYMPTOMS
TROUBLE SWALLOWING: 0
CONSTIPATION: 0
WHEEZING: 0
BLOOD IN STOOL: 0
BACK PAIN: 0
CHEST TIGHTNESS: 0
ABDOMINAL PAIN: 0
SHORTNESS OF BREATH: 0
NAUSEA: 0
VOMITING: 0
COUGH: 1
DIARRHEA: 0
VOICE CHANGE: 0

## 2024-12-10 ASSESSMENT — PATIENT HEALTH QUESTIONNAIRE - PHQ9
2. FEELING DOWN, DEPRESSED OR HOPELESS: NOT AT ALL
SUM OF ALL RESPONSES TO PHQ QUESTIONS 1-9: 0
SUM OF ALL RESPONSES TO PHQ QUESTIONS 1-9: 0
1. LITTLE INTEREST OR PLEASURE IN DOING THINGS: NOT AT ALL
SUM OF ALL RESPONSES TO PHQ9 QUESTIONS 1 & 2: 0
SUM OF ALL RESPONSES TO PHQ QUESTIONS 1-9: 0
SUM OF ALL RESPONSES TO PHQ QUESTIONS 1-9: 0

## 2024-12-10 NOTE — PROGRESS NOTES
Aamir Garcia : 1945 Sex: male  Age: 79 y.o.    Chief Complaint   Patient presents with    Discuss Labs     Lab review 6 month check up     Cough     Has had a cough for 2 weeks        Aamir was seen today for discuss labs and cough.    Diagnoses and all orders for this visit:    Chronic cough  -     XR CHEST STANDARD (2 VW); Future    Type 2 diabetes mellitus with stage 3b chronic kidney disease, with long-term current use of insulin (HCC)    Hypertensive heart disease with heart failure (HCC)  -     XR CHEST STANDARD (2 VW); Future    Hyperparathyroidism (HCC)    Malignant neoplasm of prostate (HCC)    Coronary artery disease involving native coronary artery of native heart without angina pectoris    JOEL (obstructive sleep apnea)    Hypothyroidism, unspecified type    Stage 3a chronic kidney disease (HCC)    Other orders  -     fluticasone (FLONASE) 50 MCG/ACT nasal spray; 1 spray by Each Nostril route daily      Assessment & Plan  1. Cough - Productive cough with clear sputum for 14 days, worsens when supine, no fever. Possible postnasal drip.  - Order chest x-ray to rule out pneumonia  - Resume Claritin  - Start Flonase 1 spray each nostril daily  - Initiate antibiotics if sputum changes color or chest x-ray indicates pneumonia    2. Thyroid management - Thyroid levels stable  - Continue current medication regimen    3. Anemia - Signs of anemia  - Monitor without immediate changes    4. Elevated cholesterol - Slight increase in cholesterol, triglycerides 185 from 143  - Advise reducing intake of bread, pasta, and rice    5. Health maintenance - Received shingles and flu shots. Declined further COVID-19 vaccinations    6. Frequent urination - Possibly related to high tea intake  - Advise reducing tea consumption    Follow-up  - Annual Medicare wellness visit with labs on 06/10/2025           Lab Results   Component Value Date    LABA1C 6.6 (H) 2024     No results found for: \"EAG\"

## 2024-12-30 LAB — DIABETIC RETINOPATHY: NEGATIVE

## 2025-01-07 DIAGNOSIS — E11.22 TYPE 2 DIABETES MELLITUS WITH STAGE 3B CHRONIC KIDNEY DISEASE, WITH LONG-TERM CURRENT USE OF INSULIN (HCC): ICD-10-CM

## 2025-01-07 DIAGNOSIS — N18.32 TYPE 2 DIABETES MELLITUS WITH STAGE 3B CHRONIC KIDNEY DISEASE, WITH LONG-TERM CURRENT USE OF INSULIN (HCC): ICD-10-CM

## 2025-01-07 DIAGNOSIS — Z79.4 TYPE 2 DIABETES MELLITUS WITH STAGE 3B CHRONIC KIDNEY DISEASE, WITH LONG-TERM CURRENT USE OF INSULIN (HCC): ICD-10-CM

## 2025-01-07 RX ORDER — BLOOD SUGAR DIAGNOSTIC
STRIP MISCELLANEOUS
Qty: 100 EACH | Refills: 1 | Status: SHIPPED | OUTPATIENT
Start: 2025-01-07

## 2025-01-07 NOTE — TELEPHONE ENCOUNTER
Name of Medication(s) Requested:  Requested Prescriptions     Pending Prescriptions Disp Refills    blood glucose test strips (ONETOUCH ULTRA) strip 100 each 1     Sig: USE 1 STRIP TO CHECK GLUCOSE TWICE DAILY       Medication is on current medication list Yes    Dosage and directions were verified? Yes    Quantity verified: 90 day supply     Pharmacy Verified?  Yes    Last Appointment:  12/10/2024    Future appts:  Future Appointments   Date Time Provider Department Center   6/3/2025  8:05 AM SCHEDULE, ERNESTO RUSH  E. [facility]   6/10/2025  9:00 AM Teja Smith APRN - CNP E. [facility]   6/10/2025 11:45 AM Teja Smith APRN - CNP [facility]        (If no appt send self scheduling link. .REFILLAPPT)  Scheduling request sent?     [] Yes  [x] No    Does patient need updated?  [] Yes  [x] No

## 2025-02-27 LAB
25(OH)D3 SERPL-MCNC: 43.7 NG/ML (ref 30–100)
ALBUMIN SERPL-MCNC: 3.1 G/DL (ref 3.5–5.2)
ALP SERPL-CCNC: 79 U/L (ref 40–129)
ALT SERPL-CCNC: 14 U/L (ref 0–40)
ANION GAP SERPL CALCULATED.3IONS-SCNC: 14 MMOL/L (ref 7–16)
AST SERPL-CCNC: 24 U/L (ref 0–39)
BASOPHILS # BLD: 0.02 K/UL (ref 0–0.2)
BASOPHILS NFR BLD: 0 % (ref 0–2)
BILIRUB SERPL-MCNC: 0.3 MG/DL (ref 0–1.2)
BUN SERPL-MCNC: 18 MG/DL (ref 6–23)
CALCIUM SERPL-MCNC: 8.8 MG/DL (ref 8.6–10.2)
CHLORIDE SERPL-SCNC: 100 MMOL/L (ref 98–107)
CO2 SERPL-SCNC: 26 MMOL/L (ref 22–29)
CREAT SERPL-MCNC: 1.6 MG/DL (ref 0.7–1.2)
CREAT UR-MCNC: 221.6 MG/DL (ref 40–278)
CREAT UR-MCNC: 223 MG/DL (ref 40–278)
EOSINOPHIL # BLD: 0.21 K/UL (ref 0.05–0.5)
EOSINOPHILS RELATIVE PERCENT: 3 % (ref 0–6)
ERYTHROCYTE [DISTWIDTH] IN BLOOD BY AUTOMATED COUNT: 13.2 % (ref 11.5–15)
GFR, ESTIMATED: 44 ML/MIN/1.73M2
GLUCOSE SERPL-MCNC: 114 MG/DL (ref 74–99)
HCT VFR BLD AUTO: 35.5 % (ref 37–54)
HGB BLD-MCNC: 11 G/DL (ref 12.5–16.5)
IMM GRANULOCYTES # BLD AUTO: <0.03 K/UL (ref 0–0.58)
IMM GRANULOCYTES NFR BLD: 0 % (ref 0–5)
LYMPHOCYTES NFR BLD: 1.15 K/UL (ref 1.5–4)
LYMPHOCYTES RELATIVE PERCENT: 18 % (ref 20–42)
MCH RBC QN AUTO: 30.2 PG (ref 26–35)
MCHC RBC AUTO-ENTMCNC: 31 G/DL (ref 32–34.5)
MCV RBC AUTO: 97.5 FL (ref 80–99.9)
MICROALBUMIN UR-MCNC: 3077 MG/L (ref 0–19)
MICROALBUMIN/CREAT UR-RTO: 1380 MCG/MG CREAT (ref 0–30)
MONOCYTES NFR BLD: 0.48 K/UL (ref 0.1–0.95)
MONOCYTES NFR BLD: 7 % (ref 2–12)
NEUTROPHILS NFR BLD: 71 % (ref 43–80)
NEUTS SEG NFR BLD: 4.6 K/UL (ref 1.8–7.3)
PLATELET # BLD AUTO: 204 K/UL (ref 130–450)
PMV BLD AUTO: 11.3 FL (ref 7–12)
POTASSIUM SERPL-SCNC: 4.2 MMOL/L (ref 3.5–5)
PROT SERPL-MCNC: 6.5 G/DL (ref 6.4–8.3)
RBC # BLD AUTO: 3.64 M/UL (ref 3.8–5.8)
SODIUM SERPL-SCNC: 140 MMOL/L (ref 132–146)
TOTAL PROTEIN, URINE: 468 MG/DL (ref 0–12)
URATE SERPL-MCNC: 6.9 MG/DL (ref 3.4–7)
URINE TOTAL PROTEIN CREATININE RATIO: 2.11 (ref 0–0.2)
WBC OTHER # BLD: 6.5 K/UL (ref 4.5–11.5)

## 2025-03-06 RX ORDER — ROSUVASTATIN CALCIUM 40 MG/1
40 TABLET, COATED ORAL EVERY EVENING
Qty: 90 TABLET | Refills: 1 | Status: SHIPPED | OUTPATIENT
Start: 2025-03-06 | End: 2025-09-02

## 2025-03-06 NOTE — TELEPHONE ENCOUNTER
Name of Medication(s) Requested:  Requested Prescriptions     Pending Prescriptions Disp Refills    rosuvastatin (CRESTOR) 40 MG tablet 90 tablet 1     Sig: Take 1 tablet by mouth every evening       Medication is on current medication list Yes    Dosage and directions were verified? Yes    Quantity verified: 30 day supply     Pharmacy Verified?  Yes    Last Appointment:  12/10/2024    Future appts:  Future Appointments   Date Time Provider Department Center   6/3/2025  8:05 AM SCHEDULE, ERNESTO RUSH  [facility]   6/10/2025  9:00 AM Teja Smith APRN - [facility]NP [facility]   6/10/2025 11:45 AM Teja Smith APRN - CNP [facility]        (If no appt send self scheduling link. .REFILLAPPT)  Scheduling request sent?     [] Yes  [x] No    Does patient need updated?  [] Yes  [x] No

## 2025-03-24 NOTE — PROGRESS NOTES
Medicare Annual Wellness Visit    Peyton Stuart is here for Medicare AWV (Awv today)    Assessment & Plan   Medicare annual wellness visit, subsequent  Primary hypertension    Recommendations for Preventive Services Due: see orders and patient instructions/AVS.  Recommended screening schedule for the next 5-10 years is provided to the patient in written form: see Patient Instructions/AVS.     Return for Medicare Annual Wellness Visit in 1 year. Subjective   The following acute and/or chronic problems were also addressed today:  Hypertension    Patient's complete Health Risk Assessment and screening values have been reviewed and are found in Flowsheets. The following problems were reviewed today and where indicated follow up appointments were made and/or referrals ordered. Positive Risk Factor Screenings with Interventions:        Depression:       Interpretation:   1-4 = minimal  5-9 = mild  10-14 = moderate  15-19 = moderately severe  20-27 = severe            Weight and Activity:  Physical Activity: Insufficiently Active    Days of Exercise per Week: 7 days    Minutes of Exercise per Session: 20 min     On average, how many days per week do you engage in moderate to strenuous exercise (like a brisk walk)?: 7 days  Have you lost any weight without trying in the past 3 months?: No  Body mass index is 36.73 kg/m². (!) Abnormal  Obesity Interventions:  See AVS for additional education material    Obesity Counseling: Patient was asked about his current diet and exercise habits, and personalized advice was provided regarding recommended lifestyle changes. Patient's comorbid health conditions associated with elevated BMI were discussed, as well as the likely benefits of weight loss. Based upon patient's motivation to change his behavior, the following plan was agreed upon to work toward a weight loss goal of 15 pounds: lower carbohydrate diet. Educational materials for weight loss were provided.   Patient will Rx Refill Note  Requested Prescriptions     Pending Prescriptions Disp Refills    meloxicam (MOBIC) 7.5 MG tablet [Pharmacy Med Name: MELOXICAM TABS 7.5MG] 90 tablet 7     Sig: TAKE 1 TABLET TWICE A DAY AS NEEDED FOR MODERATE PAIN      Last office visit with prescribing clinician: 11/26/2024   Last telemedicine visit with prescribing clinician: 2/24/2025   Next office visit with prescribing clinician: 4/14/2025                         Would you like a call back once the refill request has been completed: [] Yes [] No    If the office needs to give you a call back, can they leave a voicemail: [] Yes [] No    Viry Muller MA  03/24/25, 16:27 EDT

## 2025-04-03 LAB — DIABETIC RETINOPATHY: NEGATIVE

## 2025-04-07 ENCOUNTER — TELEPHONE (OUTPATIENT)
Dept: PHARMACY | Facility: CLINIC | Age: 80
End: 2025-04-07

## 2025-04-07 NOTE — TELEPHONE ENCOUNTER
[facility] CLINICAL PHARMACY: HEART FAILURE TREATMENT REVIEW  As part of [facility]'s efforts to reduce heart failure (HF) treatment gaps across the healthcare continuum, the organization is focused on improving alignment with guideline-directed medical therapy (GDMT) for heart failure with reduced ejection fraction (HFrEF).    Assessment and PLAN  The following are interventions that have been identified:  HFrEF review, patient is prescribed coreg, script is . Patient has bev consistently filling medication. Last filled 2025, not due to refill until  and has follow up with pcp in    Per cardio is prescribed entresto but is costly. I can not tell if patient is still filling it. Called patient to discuss. Patient states entresto was stopped due to dizziness and making patient not feel well. Is still taking coreg.   Educated patient to ask for refills at NOV.     Future Appointments   Date Time Provider Department Center   6/3/2025  8:05 AM SCHEDULE, ERNESTO QUIÑONES [facility]   6/10/2025  9:00 AM Teja Smith APRN - CNP [facility]   6/10/2025 11:45 AM Teja Smith APRN - CNP [facility] DEP       Luanen Schrader, PharmD, BCPS  [facility]  [facility] Clinical Pharmacist  Department: [phone]     For Pharmacy Admin Tracking Only    Program: [facility]  Gap Closed?: Yes   Time Spent (min): 20

## 2025-04-14 RX ORDER — LEVOTHYROXINE SODIUM 50 UG/1
50 TABLET ORAL DAILY
Qty: 90 TABLET | Refills: 1 | Status: SHIPPED | OUTPATIENT
Start: 2025-04-14

## 2025-04-14 NOTE — TELEPHONE ENCOUNTER
Name of Medication(s) Requested:  Requested Prescriptions     Pending Prescriptions Disp Refills    levothyroxine (SYNTHROID) 50 MCG tablet [Pharmacy Med Name: Levothyroxine Sodium 50 MCG Oral Tablet] 90 tablet 1     Sig: Take 1 tablet by mouth once daily       Medication is on current medication list Yes    Dosage and directions were verified? Yes    Quantity verified: 30 day supply     Pharmacy Verified?  Yes    Last Appointment:  12/10/2024    Future appts:  Future Appointments   Date Time Provider Department Center   6/3/2025  8:05 AM SCHEDULE, ERNESTO HOFFMAN [facility]   6/10/2025  9:00 AM Teja Smith APRN - CNP [facility]   6/10/2025 11:45 AM Teja Smith APRN - CNP [facility]        (If no appt send self scheduling link. .REFILLAPPT)  Scheduling request sent?     [] Yes  [x] No    Does patient need updated?  [] Yes  [x] No

## 2025-04-20 ENCOUNTER — APPOINTMENT (OUTPATIENT)
Dept: CT IMAGING | Age: 80
End: 2025-04-20
Payer: MEDICARE

## 2025-04-20 ENCOUNTER — APPOINTMENT (OUTPATIENT)
Dept: GENERAL RADIOLOGY | Age: 80
End: 2025-04-20
Payer: MEDICARE

## 2025-04-20 ENCOUNTER — HOSPITAL ENCOUNTER (EMERGENCY)
Age: 80
Discharge: HOME | End: 2025-04-20
Attending: STUDENT IN AN ORGANIZED HEALTH CARE EDUCATION/TRAINING PROGRAM
Payer: MEDICARE

## 2025-04-20 VITALS
TEMPERATURE: 97.4 F | RESPIRATION RATE: 16 BRPM | HEART RATE: 61 BPM | SYSTOLIC BLOOD PRESSURE: 142 MMHG | DIASTOLIC BLOOD PRESSURE: 90 MMHG | OXYGEN SATURATION: 97 %

## 2025-04-20 DIAGNOSIS — I10 HYPERTENSION, UNSPECIFIED TYPE: ICD-10-CM

## 2025-04-20 DIAGNOSIS — S09.90XA INJURY OF HEAD, INITIAL ENCOUNTER: ICD-10-CM

## 2025-04-20 DIAGNOSIS — T14.8XXA SKIN ABRASION: ICD-10-CM
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
## 2025-04-20 DIAGNOSIS — S62.522A CLOSED DISPLACED FRACTURE OF DISTAL PHALANX OF LEFT THUMB, INITIAL ENCOUNTER: ICD-10-CM

## 2025-04-20 DIAGNOSIS — S01.81XA LACERATION OF FOREHEAD, INITIAL ENCOUNTER: ICD-10-CM

## 2025-04-20 PROCEDURE — 12015 RPR F/E/E/N/L/M 7.6-12.5 CM: CPT

## 2025-04-20 PROCEDURE — 6370000000 HC RX 637 (ALT 250 FOR IP)

## 2025-04-20 PROCEDURE — 90471 IMMUNIZATION ADMIN: CPT

## 2025-04-20 PROCEDURE — 73110 X-RAY EXAM OF WRIST: CPT

## 2025-04-20 PROCEDURE — 99284 EMERGENCY DEPT VISIT MOD MDM: CPT

## 2025-04-20 PROCEDURE — 6360000002 HC RX W HCPCS

## 2025-04-20 PROCEDURE — 71045 X-RAY EXAM CHEST 1 VIEW: CPT

## 2025-04-20 PROCEDURE — 73140 X-RAY EXAM OF FINGER(S): CPT

## 2025-04-20 PROCEDURE — 70450 CT HEAD/BRAIN W/O DYE: CPT

## 2025-04-20 PROCEDURE — 29125 APPL SHORT ARM SPLINT STATIC: CPT

## 2025-04-20 PROCEDURE — 73521 X-RAY EXAM HIPS BI 2 VIEWS: CPT

## 2025-04-20 PROCEDURE — 90714 TD VACC NO PRESV 7 YRS+ IM: CPT

## 2025-04-20 PROCEDURE — 70486 CT MAXILLOFACIAL W/O DYE: CPT

## 2025-04-20 PROCEDURE — 72125 CT NECK SPINE W/O DYE: CPT

## 2025-04-20 PROCEDURE — 6370000000 HC RX 637 (ALT 250 FOR IP): Performed by: STUDENT IN AN ORGANIZED HEALTH CARE EDUCATION/TRAINING PROGRAM

## 2025-04-20 RX ORDER — ISOSORBIDE MONONITRATE 30 MG/1
30 TABLET, EXTENDED RELEASE ORAL ONCE
Status: COMPLETED | OUTPATIENT
Start: 2025-04-20 | End: 2025-04-20

## 2025-04-20 RX ORDER — BACITRACIN ZINC 500 [USP'U]/G
OINTMENT TOPICAL ONCE
Status: COMPLETED | OUTPATIENT
Start: 2025-04-20 | End: 2025-04-20

## 2025-04-20 RX ORDER — AMLODIPINE BESYLATE 5 MG/1
5 TABLET ORAL ONCE
Status: COMPLETED | OUTPATIENT
Start: 2025-04-20 | End: 2025-04-20

## 2025-04-20 RX ORDER — LIDOCAINE HYDROCHLORIDE AND EPINEPHRINE 10; 10 MG/ML; UG/ML
20 INJECTION, SOLUTION INFILTRATION; PERINEURAL ONCE
Status: COMPLETED | OUTPATIENT
Start: 2025-04-20 | End: 2025-04-20

## 2025-04-20 RX ORDER — HYDRALAZINE HYDROCHLORIDE 25 MG/1
25 TABLET, FILM COATED ORAL ONCE
Status: DISCONTINUED | OUTPATIENT
Start: 2025-04-20 | End: 2025-04-20

## 2025-04-20 RX ORDER — OXYMETAZOLINE HYDROCHLORIDE 0.05 G/100ML
1 SPRAY NASAL ONCE
Status: COMPLETED | OUTPATIENT
Start: 2025-04-20 | End: 2025-04-20

## 2025-04-20 RX ADMIN — CLOSTRIDIUM TETANI TOXOID ANTIGEN (FORMALDEHYDE INACTIVATED) AND CORYNEBACTERIUM DIPHTHERIAE TOXOID ANTIGEN (FORMALDEHYDE INACTIVATED) 0.5 ML: 5; 2 INJECTION, SUSPENSION INTRAMUSCULAR at 14:36

## 2025-04-20 RX ADMIN — OXYMETAZOLINE HYDROCHLORIDE 1 SPRAY: 0.5 SPRAY NASAL at 19:59

## 2025-04-20 RX ADMIN — ISOSORBIDE MONONITRATE 30 MG: 30 TABLET, EXTENDED RELEASE ORAL at 16:59

## 2025-04-20 RX ADMIN — AMLODIPINE BESYLATE 5 MG: 5 TABLET ORAL at 14:49

## 2025-04-20 RX ADMIN — LIDOCAINE HYDROCHLORIDE,EPINEPHRINE BITARTRATE 20 ML: 10; .01 INJECTION, SOLUTION INFILTRATION; PERINEURAL at 14:51

## 2025-04-20 RX ADMIN — BACITRACIN ZINC: 500 OINTMENT TOPICAL at 19:59

## 2025-04-20 NOTE — ED PROVIDER NOTES
Department of Emergency Medicine     Written by: Nia Cuadra MD  Patient Name: Aamir Garcia  Admit Date: 2025  2:20 PM  MRN: [id number]                   : 1945    HPI     Chief Complaint   Patient presents with    Head Injury     Pt tripped on cement steps. Hit head. Large laceration to head. Daily ASA. -LOC       Aamir Garcia is a 79 y.o. male that presents to the ED due to concerns for head injury.  The patient was going up the steps, he tripped and fell forward.  He hit his forehead on the edge of the brick and injured his nasal bridge area as well.  Does not member was last tetanus shot was.  No loss of consciousness.  No preceding symptoms including chest pain shortness of breath lightheadedness dizziness.  He has no pain in his neck no numbness or tingling down any extremity.    Review of systems   Pertinent positives and negatives mentioned in the HPI/MDM.      Physical   Physical Exam  Constitutional:       General: He is not in acute distress.     Appearance: Normal appearance.   HENT:      Head:      Comments: Forehead laceration with bone exposed from right mid supraorbital region to left mid supraorbital region, goes down midline to the nasal bridge  Eyes:      Extraocular Movements: Extraocular movements intact.      Pupils: Pupils are equal, round, and reactive to light.   Cardiovascular:      Rate and Rhythm: Normal rate and regular rhythm.      Pulses: Normal pulses.      Heart sounds: Normal heart sounds.   Pulmonary:      Effort: Pulmonary effort is normal. No respiratory distress.   Abdominal:      Palpations: Abdomen is soft.      Tenderness: There is no abdominal tenderness.   Musculoskeletal:      Comments: Pink-tan abrasion to right anterior knee, left distal forearm, top of scalp   Skin:     General: Skin is warm.      Coloration: Skin is not jaundiced.   Neurological:      Mental Status: He is alert and oriented to person, place, and time. Mental status is at 
the mid shaft distal phalanx of the thumb.   2. Nondisplaced fracture involving the proximal phalanx of the thumb.         CT HEAD WO CONTRAST   Final Result   1. No acute intracranial abnormality.   2. Right-sided nasal bone fracture.   3. Right frontal scalp hematoma.   4. Mild Pansinus mucosal thickening. Fluid present in the left mastoid air   cells.         CT CERVICAL SPINE WO CONTRAST   Final Result   1. No acute fracture.   2. Moderate multilevel degenerative changes.         CT FACIAL BONES WO CONTRAST   Final Result   1. Displaced right-sided nasal bone fracture.   2. Right frontal scalp hematoma.   3. Pansinus mucosal thickening.   4. Fluid present in the left mastoid air cells.         XR CHEST PORTABLE   Final Result   No acute process.         XR HIP BILATERAL W AP PELVIS (2 VIEWS)   Final Result   No acute abnormality of the pelvis.         XR WRIST RIGHT (MIN 3 VIEWS)   Final Result   No acute osseous abnormality.           No results found.    No results found.    PROCEDURES   Unless otherwise noted below, none       CRITICAL CARE TIME (.cct)   None    PAST MEDICAL HISTORY/Chronic Conditions Affecting Care      has a past medical history of CAD (coronary artery disease), Cancer (HCC), CHF (congestive heart failure) (HCC), Diabetes mellitus (HCC), Diverticular disease of colon, DJD (degenerative joint disease), multiple sites, GERD (gastroesophageal reflux disease), Goiter, H/O thyroid nodule, History of heart attack (1987), malignant neoplasm of prostate, Hyperlipidemia, Hypertension, Hypothyroidism, Ischemic cardiomyopathy, Parotid neoplasm, and Status post insertion of drug eluting coronary artery stent (06/18/2021).     EMERGENCY DEPARTMENT COURSE    Vitals:    Vitals:    04/20/25 1430 04/20/25 1530 04/20/25 1630 04/20/25 1800   BP: (!) 217/94 (!) 207/93 (!) 215/95 (!) 176/90   Pulse: 56 57 63 62   Resp: 16 21 20 17   Temp:       TempSrc:       SpO2: 97% 99% 98% 97%       Patient was given the

## 2025-04-20 NOTE — DISCHARGE INSTRUCTIONS
Please return to the ED for any medical emergencies please follow-up with PCP and orthopedics .  Please take your medications as prescribed.  Please use Afrin twice a day for the next 2 days only. Please have stitches removed in about 10 - 14 days.    XR FINGER LEFT (MIN 2 VIEWS)   Final Result   1. Displaced fracture the mid shaft distal phalanx of the thumb.   2. Nondisplaced fracture involving the proximal phalanx of the thumb.         CT HEAD WO CONTRAST   Final Result   1. No acute intracranial abnormality.   2. Right-sided nasal bone fracture.   3. Right frontal scalp hematoma.   4. Mild Pansinus mucosal thickening. Fluid present in the left mastoid air   cells.         CT CERVICAL SPINE WO CONTRAST   Final Result   1. No acute fracture.   2. Moderate multilevel degenerative changes.         CT FACIAL BONES WO CONTRAST   Final Result   1. Displaced right-sided nasal bone fracture.   2. Right frontal scalp hematoma.   3. Pansinus mucosal thickening.   4. Fluid present in the left mastoid air cells.         XR CHEST PORTABLE   Final Result   No acute process.         XR HIP BILATERAL W AP PELVIS (2 VIEWS)   Final Result   No acute abnormality of the pelvis.         XR WRIST RIGHT (MIN 3 VIEWS)   Final Result   No acute osseous abnormality.

## 2025-04-21 ENCOUNTER — TELEPHONE (OUTPATIENT)
Dept: PRIMARY CARE CLINIC | Age: 80
End: 2025-04-21

## 2025-04-21 NOTE — TELEPHONE ENCOUNTER
Patient wife called asking to talk to provider directly on her . She would not discuss over the voicemail or phone would like a personal phone call back from provider only.

## 2025-04-22 ENCOUNTER — OFFICE VISIT (OUTPATIENT)
Dept: FAMILY MEDICINE CLINIC | Age: 80
End: 2025-04-22

## 2025-04-22 VITALS
SYSTOLIC BLOOD PRESSURE: 122 MMHG | OXYGEN SATURATION: 98 % | RESPIRATION RATE: 16 BRPM | BODY MASS INDEX: 34.07 KG/M2 | HEART RATE: 68 BPM | DIASTOLIC BLOOD PRESSURE: 78 MMHG | TEMPERATURE: 97 F | HEIGHT: 70 IN | WEIGHT: 238 LBS

## 2025-04-22 DIAGNOSIS — S02.2XXK CLOSED FRACTURE OF NASAL BONE WITH NONUNION, SUBSEQUENT ENCOUNTER: ICD-10-CM

## 2025-04-22 DIAGNOSIS — Z91.81 AT HIGH RISK FOR FALLS: ICD-10-CM

## 2025-04-22 DIAGNOSIS — S00.83XD CONTUSION OF FACE, SUBSEQUENT ENCOUNTER: ICD-10-CM

## 2025-04-22 DIAGNOSIS — M54.2 NECK PAIN: ICD-10-CM

## 2025-04-22 DIAGNOSIS — N18.32 TYPE 2 DIABETES MELLITUS WITH STAGE 3B CHRONIC KIDNEY DISEASE, WITH LONG-TERM CURRENT USE OF INSULIN (HCC): ICD-10-CM

## 2025-04-22 DIAGNOSIS — M25.531 RIGHT WRIST PAIN: ICD-10-CM

## 2025-04-22 DIAGNOSIS — S62.512K: Primary | ICD-10-CM

## 2025-04-22 DIAGNOSIS — I11.0 HYPERTENSIVE HEART DISEASE WITH HEART FAILURE (HCC): ICD-10-CM

## 2025-04-22 DIAGNOSIS — Z79.4 TYPE 2 DIABETES MELLITUS WITH STAGE 3B CHRONIC KIDNEY DISEASE, WITH LONG-TERM CURRENT USE OF INSULIN (HCC): ICD-10-CM

## 2025-04-22 DIAGNOSIS — C61 MALIGNANT NEOPLASM OF PROSTATE (HCC): ICD-10-CM

## 2025-04-22 DIAGNOSIS — E11.22 TYPE 2 DIABETES MELLITUS WITH STAGE 3B CHRONIC KIDNEY DISEASE, WITH LONG-TERM CURRENT USE OF INSULIN (HCC): ICD-10-CM

## 2025-04-22 SDOH — ECONOMIC STABILITY: FOOD INSECURITY: WITHIN THE PAST 12 MONTHS, THE FOOD YOU BOUGHT JUST DIDN'T LAST AND YOU DIDN'T HAVE MONEY TO GET MORE.: NEVER TRUE

## 2025-04-22 SDOH — ECONOMIC STABILITY: FOOD INSECURITY: WITHIN THE PAST 12 MONTHS, YOU WORRIED THAT YOUR FOOD WOULD RUN OUT BEFORE YOU GOT MONEY TO BUY MORE.: NEVER TRUE

## 2025-04-22 ASSESSMENT — PATIENT HEALTH QUESTIONNAIRE - PHQ9
SUM OF ALL RESPONSES TO PHQ QUESTIONS 1-9: 0
SUM OF ALL RESPONSES TO PHQ QUESTIONS 1-9: 0
2. FEELING DOWN, DEPRESSED OR HOPELESS: NOT AT ALL
SUM OF ALL RESPONSES TO PHQ QUESTIONS 1-9: 0
1. LITTLE INTEREST OR PLEASURE IN DOING THINGS: NOT AT ALL
SUM OF ALL RESPONSES TO PHQ QUESTIONS 1-9: 0

## 2025-04-22 NOTE — PROGRESS NOTES
Chief Complaint:   Fall (Fell on Easter down the stairs hit his head, broke his thumb, broke his nose, bruised face, Was seen at [facility] [place] 4/20/25 )      History of Present Illness   Source of history provided by:  patient and spouse/SO.     Aamir Garcia is a 79 y.o. old male who presents to the primary care complaining of Left hand and right wrist pain pain for the past 2 days.  Pt states there was a traumatic incident in which he fell.  Pt states there is associated bruising and swelling to the area. Denies any paresthesias, weakness in the extremity, abrasions, active bleeding, recent illness, or any other complaints today.   History of Present Illness  The patient presents following a fall resulting in multiple injuries.    Chief complaint: pain and difficulty in hand mobility, right wrist pain, and neck stiffness. He fell while ascending stairs, collided with a brick wall, and impacted his face on concrete, resulting in a laceration requiring sutures (removal in 10-15 days).    Diagnosed with displaced midshaft fracture of the distal phalanx of the thumb, nondisplaced proximal phalanx fracture of the thumb, and displaced right nasal bone fracture. Noted right frontal scalp hematoma, paraspinal mucus thickening, and fluid in left mastoid air cells. No acute neck fractures, but arthritis present. Reports neck pain and stiffness, exacerbated by movement.    Post-hospital, experienced dizziness and vomiting, suspecting concussion. No visual disturbances, glasses damaged during fall. Pain managed with Tylenol; ibuprofen avoided due to bleeding risk. Maintaining adequate hydration.     Review of Systems   Unless otherwise stated in this report or unable to obtain because of the patient's clinical or mental status as evidenced by the medical record, this patients's positive and negative responses for Review of Systems, constitutional, psych, eyes, ENT, cardiovascular, respiratory, gastrointestinal, neurological,

## 2025-04-23 ENCOUNTER — TELEPHONE (OUTPATIENT)
Dept: ENT CLINIC | Age: 80
End: 2025-04-23

## 2025-04-23 NOTE — TELEPHONE ENCOUNTER
Called patient regarding apt. Dr Bain was on trauma call 4/20. Patient said he was told Dr Vega will see him and they are calling him back. Reached out to [facility] office regarding this.

## 2025-04-23 NOTE — TELEPHONE ENCOUNTER
Patient's wife LVM requesting return call to schedule patient ED follow up for nasal bone fracture from 4/20/25. States they were advised to schedule with Dr. Calhoun. [place] can be reached at [phone].    Electronically signed by Jenni Walsh MA on 4/23/25 at 2:35 PM EDT

## 2025-04-24 NOTE — TELEPHONE ENCOUNTER
[place] pt wife called Dr. Bain office to schedule. They would not schedule pt an appt. Said pt need to see ENT. Pt wife is confused and would like a call back. [phone].    Electronically signed by Giovanna Yee on 4/24/2025 at 8:57 AM

## 2025-04-24 NOTE — TELEPHONE ENCOUNTER
Dr Vega's office did not speak to patient. Explained to patients wife Dr Bain was on trauma call during ED visit. Wife given office contact information for scheduling.

## 2025-04-24 NOTE — TELEPHONE ENCOUNTER
Spoke to Danyell at Dr Bain's office and patient is scheduled 4/29 at 1:15 at [place] office. Called patients wife regarding apt confusion. She was confused why an oral surgeon would need to see him. I explained providers all rotate taking trama call. Dr Bain will be able to treat patients condition. Clarified to wife that apt is scheduled in [place] not [place]. Patients wife verbalized understanding.

## 2025-05-01 ENCOUNTER — OFFICE VISIT (OUTPATIENT)
Dept: ORTHOPEDIC SURGERY | Age: 80
End: 2025-05-01

## 2025-05-01 VITALS
HEART RATE: 65 BPM | TEMPERATURE: 97.8 F | SYSTOLIC BLOOD PRESSURE: 174 MMHG | DIASTOLIC BLOOD PRESSURE: 93 MMHG | RESPIRATION RATE: 16 BRPM | OXYGEN SATURATION: 94 %

## 2025-05-01 DIAGNOSIS — M79.645 PAIN OF FINGER OF LEFT HAND: Primary | ICD-10-CM

## 2025-05-01 DIAGNOSIS — S52.501A CLOSED FRACTURE OF DISTAL END OF RIGHT RADIUS, UNSPECIFIED FRACTURE MORPHOLOGY, INITIAL ENCOUNTER: ICD-10-CM

## 2025-05-01 NOTE — PROGRESS NOTES
[facility]  ORTHOPAEDIC SURGERY  DATE OF VISIT: 05/01/25  New Wrist/Hand Patient Visit     Referring Provider:   Teja Smith APRN - CNP  [address] [place] [address]    CHIEF COMPLAINT:   Chief Complaint   Patient presents with    Follow-up     ED f/u on left hand/thumb, left hand no pain, has quite a bit of pain in the right hand and wrist        HPI:     Aamir Garcia is a 80 y.o. year old male who is seen today  for evaluation of  R Distal radius fracture and right thumb distal and proximal phalanx pain.  He reports falling with both arms outstretched. He reports that the thumb is not causing pain, but his right wrist is causing significant pain.     PAST MEDICAL HISTORY  Past Medical History:   Diagnosis Date    CAD (coronary artery disease)     Cancer (HCC)     skin    CHF (congestive heart failure) (HCC)     Diabetes mellitus (HCC)     on insulin    Diverticular disease of colon     DJD (degenerative joint disease), multiple sites     GERD (gastroesophageal reflux disease)     Goiter     H/O thyroid nodule     History of heart attack 1987    Hx of malignant neoplasm of prostate     Hyperlipidemia     Hypertension     Hypothyroidism     Ischemic cardiomyopathy     Parotid neoplasm     Status post insertion of drug eluting coronary artery stent 06/18/2021    SVG to [facility]       PAST SURGICAL HISTORY  Past Surgical History:   Procedure Laterality Date    CAROTID ENDARTERECTOMY  05/25/2017    CHOLECYSTECTOMY  2003    COLONOSCOPY  04/23/2021    CORONARY ANGIOPLASTY WITH STENT PLACEMENT  06/17/2021    DR. FINE [facility], STENT TO RIGHT PDA 2/12 LISBETH    CORONARY ARTERY BYPASS GRAFT  10/11/2001    EYE LID SURGERY Left 2017    HERNIA REPAIR      PAROTIDECTOMY Right 9/13/2023    RIGHT PAROTIDECTOMY WITH NERVE INTEGRITY MONITOR performed by Galo Medrano MD at [facility] OR    SKIN CANCER EXCISION      MELONOM    THYROID SURGERY  09/01/2017    BX    TOTAL KNEE ARTHROPLASTY Left 12/17/2018    TOTAL KNEE

## 2025-05-06 ENCOUNTER — OFFICE VISIT (OUTPATIENT)
Dept: FAMILY MEDICINE CLINIC | Age: 80
End: 2025-05-06

## 2025-05-06 VITALS
HEART RATE: 78 BPM | SYSTOLIC BLOOD PRESSURE: 118 MMHG | WEIGHT: 234 LBS | BODY MASS INDEX: 33.5 KG/M2 | OXYGEN SATURATION: 97 % | DIASTOLIC BLOOD PRESSURE: 62 MMHG | TEMPERATURE: 98 F | RESPIRATION RATE: 18 BRPM | HEIGHT: 70 IN

## 2025-05-06 DIAGNOSIS — Z48.02 VISIT FOR SUTURE REMOVAL: Primary | ICD-10-CM

## 2025-05-06 NOTE — PROGRESS NOTES
Chief Complaint:   Suture / Staple Removal (Ch removal on forehead 2 week follow up./ )            History of Present Illness   Source of history provided by:  patient and spouse/SO.     Aamir Garcia is a 80 y.o. old male who presents to the primary care complaining of Left hand and right wrist pain pain for the past 2 days.  Pt states there was a traumatic incident in which he fell.  Pt states there is associated bruising and swelling to the area. Denies any paresthesias, weakness in the extremity, abrasions, active bleeding, recent illness, or any other complaints today.   History of Present Illness  The patient presents following a fall resulting in multiple injuries.    Chief complaint: pain and difficulty in hand mobility, right wrist pain, and neck stiffness. He fell while ascending stairs, collided with a brick wall, and impacted his face on concrete, resulting in a laceration requiring sutures (removal in 10-15 days).    Diagnosed with displaced midshaft fracture of the distal phalanx of the thumb, nondisplaced proximal phalanx fracture of the thumb, and displaced right nasal bone fracture. Noted right frontal scalp hematoma, paraspinal mucus thickening, and fluid in left mastoid air cells. No acute neck fractures, but arthritis present. Reports neck pain and stiffness, exacerbated by movement.    Post-hospital, experienced dizziness and vomiting, suspecting concussion. No visual disturbances, glasses damaged during fall. Pain managed with Tylenol; ibuprofen avoided due to bleeding risk. Maintaining adequate hydration.      The patient presents for suture removal.    He reports persistent pain in the area of the sutures, which have not yet been removed. He has been applying alcohol to the affected area. The sutures were placed using a running stitch technique. The wound was initially large enough to fit a golf ball. His great-granddaughter insists on covering the wound with a Band-Aid.     Review of

## 2025-05-07 DIAGNOSIS — I10 PRIMARY HYPERTENSION: ICD-10-CM

## 2025-05-07 DIAGNOSIS — E78.01 FAMILIAL HYPERCHOLESTEROLEMIA: ICD-10-CM

## 2025-05-07 DIAGNOSIS — E03.9 HYPOTHYROIDISM, UNSPECIFIED TYPE: ICD-10-CM

## 2025-05-07 DIAGNOSIS — Z79.899 LONG TERM USE OF DRUG: ICD-10-CM

## 2025-05-07 DIAGNOSIS — E11.22 TYPE 2 DIABETES MELLITUS WITH STAGE 3B CHRONIC KIDNEY DISEASE, WITH LONG-TERM CURRENT USE OF INSULIN (HCC): ICD-10-CM

## 2025-05-07 DIAGNOSIS — N18.32 TYPE 2 DIABETES MELLITUS WITH STAGE 3B CHRONIC KIDNEY DISEASE, WITH LONG-TERM CURRENT USE OF INSULIN (HCC): ICD-10-CM

## 2025-05-07 DIAGNOSIS — I11.0 HYPERTENSIVE HEART DISEASE WITH HEART FAILURE (HCC): Primary | ICD-10-CM

## 2025-05-07 DIAGNOSIS — E21.3 HYPERPARATHYROIDISM: ICD-10-CM

## 2025-05-07 DIAGNOSIS — Z79.4 TYPE 2 DIABETES MELLITUS WITH STAGE 3B CHRONIC KIDNEY DISEASE, WITH LONG-TERM CURRENT USE OF INSULIN (HCC): ICD-10-CM

## 2025-05-16 ENCOUNTER — OFFICE VISIT (OUTPATIENT)
Dept: ORTHOPEDIC SURGERY | Age: 80
End: 2025-05-16
Payer: MEDICARE

## 2025-05-16 VITALS
DIASTOLIC BLOOD PRESSURE: 77 MMHG | BODY MASS INDEX: 34.07 KG/M2 | WEIGHT: 238 LBS | SYSTOLIC BLOOD PRESSURE: 143 MMHG | HEIGHT: 70 IN

## 2025-05-16 DIAGNOSIS — S52.501A CLOSED FRACTURE OF DISTAL END OF RIGHT RADIUS, UNSPECIFIED FRACTURE MORPHOLOGY, INITIAL ENCOUNTER: ICD-10-CM

## 2025-05-16 DIAGNOSIS — S62.502D CLOSED NONDISPLACED FRACTURE OF PHALANX OF LEFT THUMB WITH ROUTINE HEALING, UNSPECIFIED PHALANX, SUBSEQUENT ENCOUNTER: Primary | ICD-10-CM

## 2025-05-16 DIAGNOSIS — M79.645 PAIN OF FINGER OF LEFT HAND: ICD-10-CM

## 2025-05-16 PROCEDURE — 3078F DIAST BP <80 MM HG: CPT | Performed by: STUDENT IN AN ORGANIZED HEALTH CARE EDUCATION/TRAINING PROGRAM

## 2025-05-16 PROCEDURE — 1123F ACP DISCUSS/DSCN MKR DOCD: CPT | Performed by: STUDENT IN AN ORGANIZED HEALTH CARE EDUCATION/TRAINING PROGRAM

## 2025-05-16 PROCEDURE — G8417 CALC BMI ABV UP PARAM F/U: HCPCS | Performed by: STUDENT IN AN ORGANIZED HEALTH CARE EDUCATION/TRAINING PROGRAM

## 2025-05-16 PROCEDURE — 3077F SYST BP >= 140 MM HG: CPT | Performed by: STUDENT IN AN ORGANIZED HEALTH CARE EDUCATION/TRAINING PROGRAM

## 2025-05-16 PROCEDURE — 99213 OFFICE O/P EST LOW 20 MIN: CPT | Performed by: STUDENT IN AN ORGANIZED HEALTH CARE EDUCATION/TRAINING PROGRAM

## 2025-05-16 PROCEDURE — 1036F TOBACCO NON-USER: CPT | Performed by: STUDENT IN AN ORGANIZED HEALTH CARE EDUCATION/TRAINING PROGRAM

## 2025-05-16 PROCEDURE — G8427 DOCREV CUR MEDS BY ELIG CLIN: HCPCS | Performed by: STUDENT IN AN ORGANIZED HEALTH CARE EDUCATION/TRAINING PROGRAM

## 2025-05-16 PROCEDURE — 1159F MED LIST DOCD IN RCRD: CPT | Performed by: STUDENT IN AN ORGANIZED HEALTH CARE EDUCATION/TRAINING PROGRAM

## 2025-05-16 NOTE — PROGRESS NOTES
[facility]  ORTHOPAEDIC SURGERY   DATE OF VISIT: 05/16/25  Follow Up Visit     CHIEF COMPLAINT:   Chief Complaint   Patient presents with    Follow-up        Aamir Garcia returns today for follow up visit regarding left thumb distal phalanx and proximal phalanx fractures and right distal radius fracture.  He has been treated nonoperatively with splinting to the left thumb and casting of the right wrist.  He has been doing well and happy with his progress.  States his pain continues to become less and less    Physical Exam:     No data recorded    General: Alert and oriented x3, no acute distress  Psych: mentation normal, non pressured speech   Cardiovascular/pulmonary: No labored breathing, peripheral perfusion intact  Musculoskeletal:    Left upper extremity  Skin intact other than some initial beginning of sloughing of the thumbnail  Compartment soft compress  Neurovascular intact otherwise  +2 radial pulse  Sensation intact light touch about median, radial, ulnar nerves  Positive and, PIN, ulnar nerve  Slight tenderness with patient over distal phalanx of thumb and proximal phalanx    Right upper extremity  Skin intact  Compartment soft compressible  +2 radial pulse  Sensation tact light touch median, radial, ulnar nerves  Positive and, PIN, ulnar nerve function  There is minimal tenderness with patient and less swelling over the distal radius today compared to his prior visit      Controlled Substances Monitoring:      Imaging:  X-rays reviewed today of the right distal radius demonstrating intra-articular distal radius fracture AOB type pattern partial articular with maintained alignment both in AP and lateral planes.  Overall congruity of the joint surface is still well-maintained.  Sclerotic vessels are present as well as degenerative changes within the carpus.    X-ray left hand multiple views: Demonstrating comminuted fracture of the distal phalanx and proximal phalanx of the thumb however overall

## 2025-05-27 LAB
ALBUMIN SERPL-MCNC: 3.1 G/DL (ref 3.5–5.2)
ALP SERPL-CCNC: 94 U/L (ref 40–129)
ALT SERPL-CCNC: 18 U/L (ref 0–40)
ANION GAP SERPL CALCULATED.3IONS-SCNC: 14 MMOL/L (ref 7–16)
AST SERPL-CCNC: 22 U/L (ref 0–39)
BILIRUB SERPL-MCNC: 0.4 MG/DL (ref 0–1.2)
BUN SERPL-MCNC: 19 MG/DL (ref 6–23)
CALCIUM SERPL-MCNC: 9 MG/DL (ref 8.6–10.2)
CHLORIDE SERPL-SCNC: 101 MMOL/L (ref 98–107)
CO2 SERPL-SCNC: 23 MMOL/L (ref 22–29)
CREAT SERPL-MCNC: 1.6 MG/DL (ref 0.7–1.2)
CREAT UR-MCNC: 94.9 MG/DL (ref 40–278)
CREAT UR-MCNC: 95.4 MG/DL (ref 40–278)
GFR, ESTIMATED: 44 ML/MIN/1.73M2
GLUCOSE SERPL-MCNC: 88 MG/DL (ref 74–99)
MICROALBUMIN UR-MCNC: 792 MG/L (ref 0–20)
MICROALBUMIN/CREAT UR-RTO: 835 MCG/MG CREAT (ref 0–30)
POTASSIUM SERPL-SCNC: 4.3 MMOL/L (ref 3.5–5)
PROT SERPL-MCNC: 6.5 G/DL (ref 6.4–8.3)
SODIUM SERPL-SCNC: 138 MMOL/L (ref 132–146)
TOTAL PROTEIN, URINE: 143 MG/DL (ref 0–12)
URINE TOTAL PROTEIN CREATININE RATIO: 1.5 (ref 0–0.2)

## 2025-06-04 RX ORDER — CARVEDILOL 25 MG/1
25 TABLET ORAL 2 TIMES DAILY WITH MEALS
Qty: 180 TABLET | Refills: 1 | Status: SHIPPED | OUTPATIENT
Start: 2025-06-04

## 2025-06-04 NOTE — TELEPHONE ENCOUNTER
Name of Medication(s) Requested:  Requested Prescriptions     Pending Prescriptions Disp Refills    carvedilol (COREG) 25 MG tablet [Pharmacy Med Name: Carvedilol 25 MG Oral Tablet] 180 tablet 1     Sig: TAKE 1 TABLET BY MOUTH TWICE DAILY WITH MEALS       Medication is on current medication list Yes    Dosage and directions were verified? Yes    Quantity verified: 90 day supply     Pharmacy Verified?  Yes    Last Appointment:  5/6/2025    Future appts:  Future Appointments   Date Time Provider Department Center   6/6/2025  8:30 AM SCHEDULE, ERNESTO HOFFMAN [facility]   6/10/2025  9:00 AM Teja Smith APRN - CNP E. [facility]   6/10/2025 11:45 AM Teja Smith APRN - CNP E. [facility]   6/12/2025  9:15 AM Cesar Montes, DO  [facility]        (If no appt send self scheduling link. .REFILLAPPT)  Scheduling request sent?     [] Yes  [x] No    Does patient need updated?  [] Yes  [x] No

## 2025-06-06 ENCOUNTER — LAB (OUTPATIENT)
Dept: FAMILY MEDICINE CLINIC | Age: 80
End: 2025-06-06
Payer: MEDICARE

## 2025-06-06 DIAGNOSIS — Z79.899 LONG TERM USE OF DRUG: ICD-10-CM

## 2025-06-06 DIAGNOSIS — E03.9 HYPOTHYROIDISM, UNSPECIFIED TYPE: ICD-10-CM

## 2025-06-06 DIAGNOSIS — I10 PRIMARY HYPERTENSION: ICD-10-CM

## 2025-06-06 DIAGNOSIS — E11.22 TYPE 2 DIABETES MELLITUS WITH STAGE 3B CHRONIC KIDNEY DISEASE, WITH LONG-TERM CURRENT USE OF INSULIN (HCC): ICD-10-CM

## 2025-06-06 DIAGNOSIS — E78.01 FAMILIAL HYPERCHOLESTEROLEMIA: ICD-10-CM

## 2025-06-06 DIAGNOSIS — Z79.4 TYPE 2 DIABETES MELLITUS WITH STAGE 3B CHRONIC KIDNEY DISEASE, WITH LONG-TERM CURRENT USE OF INSULIN (HCC): ICD-10-CM

## 2025-06-06 DIAGNOSIS — N18.32 TYPE 2 DIABETES MELLITUS WITH STAGE 3B CHRONIC KIDNEY DISEASE, WITH LONG-TERM CURRENT USE OF INSULIN (HCC): ICD-10-CM

## 2025-06-06 LAB
BASOPHILS ABSOLUTE: 0.03 K/UL (ref 0–0.2)
BASOPHILS RELATIVE PERCENT: 0 % (ref 0–2)
CHOLESTEROL, TOTAL: 144 MG/DL
EOSINOPHILS ABSOLUTE: 0.15 K/UL (ref 0.05–0.5)
EOSINOPHILS RELATIVE PERCENT: 2 % (ref 0–6)
HCT VFR BLD CALC: 33.5 % (ref 37–54)
HDLC SERPL-MCNC: 41 MG/DL
HEMOGLOBIN: 10.8 G/DL (ref 12.5–16.5)
IMMATURE GRANULOCYTES %: 0 % (ref 0–5)
IMMATURE GRANULOCYTES ABSOLUTE: <0.03 K/UL (ref 0–0.58)
LDL CHOLESTEROL: 62 MG/DL
LYMPHOCYTES ABSOLUTE: 1.26 K/UL (ref 1.5–4)
LYMPHOCYTES RELATIVE PERCENT: 16 % (ref 20–42)
MCH RBC QN AUTO: 30.5 PG (ref 26–35)
MCHC RBC AUTO-ENTMCNC: 32.2 G/DL (ref 32–34.5)
MCV RBC AUTO: 94.6 FL (ref 80–99.9)
MONOCYTES ABSOLUTE: 0.43 K/UL (ref 0.1–0.95)
MONOCYTES RELATIVE PERCENT: 6 % (ref 2–12)
NEUTROPHILS ABSOLUTE: 5.83 K/UL (ref 1.8–7.3)
NEUTROPHILS RELATIVE PERCENT: 76 % (ref 43–80)
PDW BLD-RTO: 13 % (ref 11.5–15)
PLATELET # BLD: 190 K/UL (ref 130–450)
PMV BLD AUTO: 11.3 FL (ref 7–12)
RBC # BLD: 3.54 M/UL (ref 3.8–5.8)
TRIGL SERPL-MCNC: 206 MG/DL
TSH SERPL DL<=0.05 MIU/L-ACNC: 3.12 UIU/ML (ref 0.27–4.2)
VLDLC SERPL CALC-MCNC: 41 MG/DL
WBC # BLD: 7.7 K/UL (ref 4.5–11.5)

## 2025-06-06 PROCEDURE — 36415 COLL VENOUS BLD VENIPUNCTURE: CPT | Performed by: NURSE PRACTITIONER

## 2025-06-07 LAB — HBA1C MFR BLD: 5.7 % (ref 4–5.6)

## 2025-06-09 ENCOUNTER — RESULTS FOLLOW-UP (OUTPATIENT)
Dept: FAMILY MEDICINE CLINIC | Age: 80
End: 2025-06-09

## 2025-06-12 ENCOUNTER — OFFICE VISIT (OUTPATIENT)
Dept: ORTHOPEDIC SURGERY | Age: 80
End: 2025-06-12
Payer: MEDICARE

## 2025-06-12 VITALS
DIASTOLIC BLOOD PRESSURE: 76 MMHG | TEMPERATURE: 97.6 F | OXYGEN SATURATION: 96 % | RESPIRATION RATE: 18 BRPM | HEIGHT: 70 IN | HEART RATE: 60 BPM | SYSTOLIC BLOOD PRESSURE: 145 MMHG | WEIGHT: 238 LBS | BODY MASS INDEX: 34.07 KG/M2

## 2025-06-12 DIAGNOSIS — S62.502D CLOSED NONDISPLACED FRACTURE OF PHALANX OF LEFT THUMB WITH ROUTINE HEALING, UNSPECIFIED PHALANX, SUBSEQUENT ENCOUNTER: Primary | ICD-10-CM

## 2025-06-12 DIAGNOSIS — S52.501A CLOSED FRACTURE OF DISTAL END OF RIGHT RADIUS, UNSPECIFIED FRACTURE MORPHOLOGY, INITIAL ENCOUNTER: ICD-10-CM

## 2025-06-12 DIAGNOSIS — M79.645 PAIN OF FINGER OF LEFT HAND: ICD-10-CM

## 2025-06-12 PROCEDURE — 1159F MED LIST DOCD IN RCRD: CPT | Performed by: STUDENT IN AN ORGANIZED HEALTH CARE EDUCATION/TRAINING PROGRAM

## 2025-06-12 PROCEDURE — G8427 DOCREV CUR MEDS BY ELIG CLIN: HCPCS | Performed by: STUDENT IN AN ORGANIZED HEALTH CARE EDUCATION/TRAINING PROGRAM

## 2025-06-12 PROCEDURE — 3077F SYST BP >= 140 MM HG: CPT | Performed by: STUDENT IN AN ORGANIZED HEALTH CARE EDUCATION/TRAINING PROGRAM

## 2025-06-12 PROCEDURE — 99213 OFFICE O/P EST LOW 20 MIN: CPT | Performed by: STUDENT IN AN ORGANIZED HEALTH CARE EDUCATION/TRAINING PROGRAM

## 2025-06-12 PROCEDURE — 1123F ACP DISCUSS/DSCN MKR DOCD: CPT | Performed by: STUDENT IN AN ORGANIZED HEALTH CARE EDUCATION/TRAINING PROGRAM

## 2025-06-12 PROCEDURE — 3078F DIAST BP <80 MM HG: CPT | Performed by: STUDENT IN AN ORGANIZED HEALTH CARE EDUCATION/TRAINING PROGRAM

## 2025-06-12 PROCEDURE — G8417 CALC BMI ABV UP PARAM F/U: HCPCS | Performed by: STUDENT IN AN ORGANIZED HEALTH CARE EDUCATION/TRAINING PROGRAM

## 2025-06-12 PROCEDURE — 1036F TOBACCO NON-USER: CPT | Performed by: STUDENT IN AN ORGANIZED HEALTH CARE EDUCATION/TRAINING PROGRAM

## 2025-06-12 NOTE — PROGRESS NOTES
[facility]  ORTHOPAEDICS    Follow Up Visit     CHIEF COMPLAINT:   Chief Complaint   Patient presents with    Follow-up     Left hand  thumb     patient in cast          Aamir Garcia returns today for follow up visit regarding right distal radius fracture and left distal phalanx proximal phalanx thumb fractures    History of Present Illness  The patient presents for evaluation of a healing right distal radius fracture of the lunate facet and healing left distal phalanx and proximal phalanx fractures of the thumb.    He reports experiencing mild soreness in his right wrist, which he attributes to the healing process. He has been utilizing a removable wrist brace for support but forgot to bring it to today's appointment.    Physical Exam:     Height: 1.778 m (5' 10\"), Weight - Scale: 108 kg (238 lb), BP: (!) 145/76    General: Alert and oriented x3, no acute distress  Cardiovascular/pulmonary: No labored breathing, peripheral perfusion intact  Musculoskeletal:    Physical Exam  Musculoskeletal:  Right upper extremity: Skin intact. Compartment soft and compressible. +2 radial pulse. Sensation intact to light touch, median, radial, ulnar nerves. Positive PIN, ulnar nerve function. Slight soreness with passive and active range of motion with wrist extension, flexion, ulnar and radial deviation. No tenderness to palpation with squeezing of the distal radius fracture site.  Left upper extremity (thumb): Brisk capillary refill present. Nail bed and nail healing nicely. Minimal swelling compared to prior visits. No tenderness to palpation with squeezing of the distal phalanx or proximal phalanx. Collateral stability intact. No pain with passive or active range of motion of the thumb. Neurovascularly intact.      Controlled Substances Monitoring:      Imaging:    Results  Imaging   - X-rays of the left thumb: Multiple views demonstrate healing distal phalanx and proximal phalanx fractures with no change in displacement

## 2025-06-17 ENCOUNTER — OFFICE VISIT (OUTPATIENT)
Dept: FAMILY MEDICINE CLINIC | Age: 80
End: 2025-06-17

## 2025-06-17 VITALS
OXYGEN SATURATION: 97 % | HEART RATE: 62 BPM | RESPIRATION RATE: 16 BRPM | HEART RATE: 62 BPM | BODY MASS INDEX: 33.4 KG/M2 | RESPIRATION RATE: 16 BRPM | OXYGEN SATURATION: 97 % | WEIGHT: 233.3 LBS | HEIGHT: 70 IN | DIASTOLIC BLOOD PRESSURE: 86 MMHG | BODY MASS INDEX: 33.4 KG/M2 | TEMPERATURE: 97.3 F | SYSTOLIC BLOOD PRESSURE: 130 MMHG | HEIGHT: 70 IN | DIASTOLIC BLOOD PRESSURE: 86 MMHG | SYSTOLIC BLOOD PRESSURE: 130 MMHG | TEMPERATURE: 97.3 F | WEIGHT: 233.3 LBS

## 2025-06-17 DIAGNOSIS — E78.01 FAMILIAL HYPERCHOLESTEROLEMIA: ICD-10-CM

## 2025-06-17 DIAGNOSIS — E21.3 HYPERPARATHYROIDISM: ICD-10-CM

## 2025-06-17 DIAGNOSIS — D63.8 CHRONIC DISEASE ANEMIA: ICD-10-CM

## 2025-06-17 DIAGNOSIS — Z79.899 LONG TERM USE OF DRUG: ICD-10-CM

## 2025-06-17 DIAGNOSIS — Z00.00 MEDICARE ANNUAL WELLNESS VISIT, SUBSEQUENT: Primary | ICD-10-CM

## 2025-06-17 DIAGNOSIS — Z79.4 TYPE 2 DIABETES MELLITUS WITH STAGE 3B CHRONIC KIDNEY DISEASE, WITH LONG-TERM CURRENT USE OF INSULIN (HCC): Primary | ICD-10-CM

## 2025-06-17 DIAGNOSIS — G25.81 RLS (RESTLESS LEGS SYNDROME): ICD-10-CM

## 2025-06-17 DIAGNOSIS — E11.22 TYPE 2 DIABETES MELLITUS WITH STAGE 3B CHRONIC KIDNEY DISEASE, WITH LONG-TERM CURRENT USE OF INSULIN (HCC): Primary | ICD-10-CM

## 2025-06-17 DIAGNOSIS — I10 PRIMARY HYPERTENSION: ICD-10-CM

## 2025-06-17 DIAGNOSIS — N18.32 TYPE 2 DIABETES MELLITUS WITH STAGE 3B CHRONIC KIDNEY DISEASE, WITH LONG-TERM CURRENT USE OF INSULIN (HCC): Primary | ICD-10-CM

## 2025-06-17 DIAGNOSIS — N18.31 STAGE 3A CHRONIC KIDNEY DISEASE (HCC): ICD-10-CM

## 2025-06-17 DIAGNOSIS — K57.30 DIVERTICULAR DISEASE OF COLON: ICD-10-CM

## 2025-06-17 RX ORDER — SODIUM BICARBONATE 650 MG/1
650 TABLET ORAL DAILY
Qty: 90 TABLET | Refills: 1 | Status: SHIPPED | OUTPATIENT
Start: 2025-06-17

## 2025-06-17 RX ORDER — INSULIN DEGLUDEC 200 U/ML
INJECTION, SOLUTION SUBCUTANEOUS
Qty: 1 ADJUSTABLE DOSE PRE-FILLED PEN SYRINGE | Refills: 5 | Status: SHIPPED | OUTPATIENT
Start: 2025-06-17

## 2025-06-17 RX ORDER — ROPINIROLE 0.5 MG/1
0.5 TABLET, FILM COATED ORAL NIGHTLY
Qty: 30 TABLET | Refills: 3 | Status: SHIPPED | OUTPATIENT
Start: 2025-06-17

## 2025-06-17 RX ORDER — RANOLAZINE 1000 MG/1
1000 TABLET, EXTENDED RELEASE ORAL 2 TIMES DAILY
Qty: 180 TABLET | Refills: 1 | Status: SHIPPED | OUTPATIENT
Start: 2025-06-17

## 2025-06-17 ASSESSMENT — ENCOUNTER SYMPTOMS
BACK PAIN: 1
BLOOD IN STOOL: 0
TROUBLE SWALLOWING: 0
ABDOMINAL PAIN: 0
DIARRHEA: 0
VOICE CHANGE: 0
CHEST TIGHTNESS: 0
SHORTNESS OF BREATH: 0
WHEEZING: 0
VOMITING: 0
NAUSEA: 0
COUGH: 0
CONSTIPATION: 0

## 2025-06-17 ASSESSMENT — PATIENT HEALTH QUESTIONNAIRE - PHQ9
1. LITTLE INTEREST OR PLEASURE IN DOING THINGS: NOT AT ALL
SUM OF ALL RESPONSES TO PHQ QUESTIONS 1-9: 0
SUM OF ALL RESPONSES TO PHQ QUESTIONS 1-9: 0
2. FEELING DOWN, DEPRESSED OR HOPELESS: NOT AT ALL
SUM OF ALL RESPONSES TO PHQ QUESTIONS 1-9: 0
SUM OF ALL RESPONSES TO PHQ QUESTIONS 1-9: 0

## 2025-06-17 ASSESSMENT — LIFESTYLE VARIABLES
HOW MANY STANDARD DRINKS CONTAINING ALCOHOL DO YOU HAVE ON A TYPICAL DAY: PATIENT DOES NOT DRINK
HOW OFTEN DO YOU HAVE A DRINK CONTAINING ALCOHOL: NEVER

## 2025-06-17 NOTE — PROGRESS NOTES
Medicare Annual Wellness Visit    Aamir Garcia is here for Medicare AWV    Assessment & Plan   Medicare annual wellness visit, subsequent  RLS (restless legs syndrome)       Return for Medicare Annual Wellness Visit in 1 year.     Subjective   The following acute and/or chronic problems were also addressed today:  RLS    Patient's complete Health Risk Assessment and screening values have been reviewed and are found in Flowsheets. The following problems were reviewed today and where indicated follow up appointments were made and/or referrals ordered.    Positive Risk Factor Screenings with Interventions:    Fall Risk:  Do you feel unsteady or are you worried about falling? : (!) yes  2 or more falls in past year?: no  Fall with injury in past year?: (!) yes     Interventions:    Reviewed medications, home hazards, visual acuity, and co-morbidities that can increase risk for falls  See AVS for additional education material             Inactivity:  On average, how many days per week do you engage in moderate to strenuous exercise (like a brisk walk)?: 0 days (!) Abnormal  On average, how many minutes do you engage in exercise at this level?: 0 min  Interventions:  See AVS for additional education material    Poor Eating Habits/Diet:  Do you eat balanced/healthy meals regularly?: (!) No  Interventions:  See AVS for additional education material    Abnormal BMI (obese):  Body mass index is 33.48 kg/m². (!) Abnormal  Interventions:  See AVS for additional education material    Obesity Counseling: Patient was asked about his current diet and exercise habits, and personalized advice was provided regarding recommended lifestyle changes. Patient's comorbid health conditions associated with elevated BMI were discussed, as well as the likely benefits of weight loss. Based upon patient's motivation to change his behavior, the following plan was agreed upon to work toward a weight loss goal of 25 pounds: lower carbohydrate

## 2025-06-17 NOTE — PROGRESS NOTES
Lymphadenopathy:      Cervical: No cervical adenopathy.   Skin:     General: Skin is warm and dry.      Capillary Refill: Capillary refill takes 2 to 3 seconds.      Findings: No bruising, lesion or rash.      Comments:     Neurological:      General: No focal deficit present.      Mental Status: He is alert and oriented to person, place, and time.      Motor: No weakness.      Gait: Gait normal.   Psychiatric:         Attention and Perception: Attention normal.         Mood and Affect: Mood normal.         Behavior: Behavior normal.         Thought Content: Thought content does not include homicidal or suicidal ideation. Thought content does not include homicidal or suicidal plan.              Seen By:  Teja Smith, APRN - CNP

## 2025-07-10 ENCOUNTER — TELEPHONE (OUTPATIENT)
Dept: FAMILY MEDICINE CLINIC | Age: 80
End: 2025-07-10

## 2025-07-10 ENCOUNTER — APPOINTMENT (OUTPATIENT)
Dept: GENERAL RADIOLOGY | Age: 80
End: 2025-07-10
Payer: MEDICARE

## 2025-07-10 ENCOUNTER — HOSPITAL ENCOUNTER (OUTPATIENT)
Age: 80
Setting detail: OBSERVATION
Discharge: HOME | End: 2025-07-10
Attending: EMERGENCY MEDICINE | Admitting: INTERNAL MEDICINE
Payer: MEDICARE

## 2025-07-10 ENCOUNTER — OFFICE VISIT (OUTPATIENT)
Age: 80
End: 2025-07-10

## 2025-07-10 VITALS
HEART RATE: 74 BPM | DIASTOLIC BLOOD PRESSURE: 48 MMHG | WEIGHT: 230.6 LBS | TEMPERATURE: 97.7 F | RESPIRATION RATE: 16 BRPM | HEIGHT: 70 IN | BODY MASS INDEX: 33.01 KG/M2 | OXYGEN SATURATION: 97 % | SYSTOLIC BLOOD PRESSURE: 80 MMHG

## 2025-07-10 VITALS
WEIGHT: 230 LBS | HEART RATE: 63 BPM | HEIGHT: 70 IN | DIASTOLIC BLOOD PRESSURE: 81 MMHG | RESPIRATION RATE: 22 BRPM | BODY MASS INDEX: 32.93 KG/M2 | SYSTOLIC BLOOD PRESSURE: 192 MMHG | OXYGEN SATURATION: 99 % | TEMPERATURE: 99.6 F

## 2025-07-10 DIAGNOSIS — R26.2 AMBULATORY DYSFUNCTION: ICD-10-CM

## 2025-07-10 DIAGNOSIS — N18.31 STAGE 3A CHRONIC KIDNEY DISEASE (HCC): ICD-10-CM

## 2025-07-10 DIAGNOSIS — D64.9 ANEMIA, UNSPECIFIED TYPE: ICD-10-CM

## 2025-07-10 DIAGNOSIS — I95.1 ORTHOSTATIC HYPOTENSION: Primary | ICD-10-CM

## 2025-07-10 DIAGNOSIS — R42 LIGHTHEADED: Primary | ICD-10-CM

## 2025-07-10 DIAGNOSIS — R42 DIZZINESS: ICD-10-CM

## 2025-07-10 DIAGNOSIS — E08.22 DIABETES MELLITUS DUE TO UNDERLYING CONDITION WITH CHRONIC KIDNEY DISEASE, WITHOUT LONG-TERM CURRENT USE OF INSULIN, UNSPECIFIED CKD STAGE (HCC): ICD-10-CM

## 2025-07-10 DIAGNOSIS — I95.0 IDIOPATHIC HYPOTENSION: ICD-10-CM

## 2025-07-10 DIAGNOSIS — R11.2 NAUSEA AND VOMITING, UNSPECIFIED VOMITING TYPE: ICD-10-CM

## 2025-07-10 DIAGNOSIS — D50.9 IRON DEFICIENCY ANEMIA, UNSPECIFIED IRON DEFICIENCY ANEMIA TYPE: ICD-10-CM

## 2025-07-10 LAB
ALBUMIN SERPL-MCNC: 3.1 G/DL (ref 3.5–5.2)
ALP SERPL-CCNC: 91 U/L (ref 40–129)
ALT SERPL-CCNC: 12 U/L (ref 0–50)
ANION GAP SERPL CALCULATED.3IONS-SCNC: 11 MMOL/L (ref 7–16)
AST SERPL-CCNC: 19 U/L (ref 0–50)
BASOPHILS # BLD: 0.01 K/UL (ref 0–0.2)
BASOPHILS NFR BLD: 0 % (ref 0–2)
BILIRUB SERPL-MCNC: 0.4 MG/DL (ref 0–1.2)
BNP SERPL-MCNC: 4458 PG/ML (ref 0–450)
BUN SERPL-MCNC: 17 MG/DL (ref 8–23)
CALCIUM SERPL-MCNC: 8.5 MG/DL (ref 8.8–10.2)
CHLORIDE SERPL-SCNC: 103 MMOL/L (ref 98–107)
CO2 SERPL-SCNC: 24 MMOL/L (ref 22–29)
CREAT SERPL-MCNC: 1.6 MG/DL (ref 0.7–1.2)
EOSINOPHIL # BLD: 0.03 K/UL (ref 0.05–0.5)
EOSINOPHILS RELATIVE PERCENT: 0 % (ref 0–6)
ERYTHROCYTE [DISTWIDTH] IN BLOOD BY AUTOMATED COUNT: 13 % (ref 11.5–15)
GFR, ESTIMATED: 43 ML/MIN/1.73M2
GLUCOSE BLD-MCNC: 115 MG/DL (ref 74–99)
GLUCOSE SERPL-MCNC: 97 MG/DL (ref 74–99)
HCT VFR BLD AUTO: 29.8 % (ref 37–54)
HGB BLD-MCNC: 9.9 G/DL (ref 12.5–16.5)
IMM GRANULOCYTES # BLD AUTO: <0.03 K/UL (ref 0–0.58)
IMM GRANULOCYTES NFR BLD: 0 % (ref 0–5)
LYMPHOCYTES NFR BLD: 0.62 K/UL (ref 1.5–4)
LYMPHOCYTES RELATIVE PERCENT: 9 % (ref 20–42)
MAGNESIUM SERPL-MCNC: 1.6 MG/DL (ref 1.6–2.4)
MCH RBC QN AUTO: 30.1 PG (ref 26–35)
MCHC RBC AUTO-ENTMCNC: 33.2 G/DL (ref 32–34.5)
MCV RBC AUTO: 90.6 FL (ref 80–99.9)
MONOCYTES NFR BLD: 0.44 K/UL (ref 0.1–0.95)
MONOCYTES NFR BLD: 6 % (ref 2–12)
NEUTROPHILS NFR BLD: 85 % (ref 43–80)
NEUTS SEG NFR BLD: 6.12 K/UL (ref 1.8–7.3)
PLATELET # BLD AUTO: 157 K/UL (ref 130–450)
PMV BLD AUTO: 10.8 FL (ref 7–12)
POTASSIUM SERPL-SCNC: 3.9 MMOL/L (ref 3.5–5.1)
PROT SERPL-MCNC: 6.2 G/DL (ref 6.4–8.3)
RBC # BLD AUTO: 3.29 M/UL (ref 3.8–5.8)
SODIUM SERPL-SCNC: 138 MMOL/L (ref 136–145)
TROPONIN I SERPL HS-MCNC: 48 NG/L (ref 0–22)
TROPONIN I SERPL HS-MCNC: 50 NG/L (ref 0–22)
WBC OTHER # BLD: 7.2 K/UL (ref 4.5–11.5)

## 2025-07-10 PROCEDURE — 93005 ELECTROCARDIOGRAM TRACING: CPT

## 2025-07-10 PROCEDURE — 85025 COMPLETE CBC W/AUTO DIFF WBC: CPT

## 2025-07-10 PROCEDURE — 80053 COMPREHEN METABOLIC PANEL: CPT

## 2025-07-10 PROCEDURE — 2580000003 HC RX 258: Performed by: EMERGENCY MEDICINE

## 2025-07-10 PROCEDURE — 96360 HYDRATION IV INFUSION INIT: CPT

## 2025-07-10 PROCEDURE — 99285 EMERGENCY DEPT VISIT HI MDM: CPT

## 2025-07-10 PROCEDURE — 71046 X-RAY EXAM CHEST 2 VIEWS: CPT

## 2025-07-10 PROCEDURE — 83880 ASSAY OF NATRIURETIC PEPTIDE: CPT

## 2025-07-10 PROCEDURE — 82962 GLUCOSE BLOOD TEST: CPT

## 2025-07-10 PROCEDURE — 83735 ASSAY OF MAGNESIUM: CPT

## 2025-07-10 PROCEDURE — 84484 ASSAY OF TROPONIN QUANT: CPT

## 2025-07-10 PROCEDURE — G0378 HOSPITAL OBSERVATION PER HR: HCPCS

## 2025-07-10 RX ORDER — 0.9 % SODIUM CHLORIDE 0.9 %
1000 INTRAVENOUS SOLUTION INTRAVENOUS ONCE
Status: COMPLETED | OUTPATIENT
Start: 2025-07-10 | End: 2025-07-10

## 2025-07-10 RX ORDER — ONDANSETRON 2 MG/ML
4 INJECTION INTRAMUSCULAR; INTRAVENOUS ONCE
Status: COMPLETED | OUTPATIENT
Start: 2025-07-10 | End: 2025-07-10

## 2025-07-10 RX ADMIN — ONDANSETRON 4 MG: 2 INJECTION INTRAMUSCULAR; INTRAVENOUS at 12:39

## 2025-07-10 RX ADMIN — SODIUM CHLORIDE 1000 ML: 0.9 INJECTION, SOLUTION INTRAVENOUS at 15:00

## 2025-07-10 ASSESSMENT — ENCOUNTER SYMPTOMS
VOICE CHANGE: 0
COUGH: 0
NAUSEA: 0
VOMITING: 0
WHEEZING: 0
BACK PAIN: 1
DIARRHEA: 0
TROUBLE SWALLOWING: 0
CHEST TIGHTNESS: 0
BLOOD IN STOOL: 0
CONSTIPATION: 0
ABDOMINAL PAIN: 0
SHORTNESS OF BREATH: 0

## 2025-07-10 ASSESSMENT — PAIN - FUNCTIONAL ASSESSMENT: PAIN_FUNCTIONAL_ASSESSMENT: NONE - DENIES PAIN

## 2025-07-10 NOTE — TELEPHONE ENCOUNTER
Apt to see hematologist on Monday wife worried of anemic number dropping and wants to know to if there is blood work she can come down to the office and get done so they can check if it has dropped any     Wifes phone   [phone]    Wife advised to come in through walk in

## 2025-07-10 NOTE — ED PROVIDER NOTES
[facility] EMERGENCY DEPARTMENT  EMERGENCY DEPARTMENT ENCOUNTER        Pt Name: Aamir Garcia  MRN: [id number]  Birthdate 1945  Date of evaluation: 7/10/2025  Provider: Jenni Braxton DO  PCP: Teja Smith APRN - CNP  Note Started: 1:53 PM EDT 7/10/25    CHIEF COMPLAINT       Chief Complaint   Patient presents with    Dizziness    Nausea    abnormal labs       HISTORY OF PRESENT ILLNESS: 1 or more Elements   History From: Patient    Limitations to history : None    Aamir Garcia is a 80 y.o. male with past medical history of chronic kidney disease, malignant neoplasm of prostate, diabetes mellitus, morbid obesity, coronary artery disease who presents to the emergency department from his primary care office for complaints of lightheadedness and nausea with vomiting.  According to EMS, the patient also had abnormal labs, however it is unknown what labs were abnormal.  The patient states that he has been feeling lightheaded for the past year.  He states that his lightheadedness happens when he stands up and ambulates.  He states that when he is sitting at rest, he does not have any lightheadedness.  He states that he also has intermittent nausea and vomiting.  He states that nothing really seems to precipitate these episodes.  He denies any fevers, chest pain, shortness of breath.  He states that he does have chronic diarrhea which has also been going on for years.  He denies any blood in his stool or dark tarry stools.  He states that his last colonoscopy was about 5 years ago and did show some polyps.  He states that his appetite has not been good and he did not eat anything prior to his primary care appointment today.      Nursing Notes were all reviewed and agreed with or any disagreements were addressed in the HPI.        REVIEW OF EXTERNAL NOTES :         Reviewed progress note from today      REVIEW OF SYSTEMS :           Positives and Pertinent negatives as

## 2025-07-10 NOTE — ED NOTES
Patient ambulated again and did very well. Patient states he feels much better after receiving iv hydration and wishes to go home. Physician notified.

## 2025-07-10 NOTE — PLAN OF CARE
I personally examined the patient at bedside.  His vitals were stable.  Auscultation of his lungs showed no abnormalities.  No lower extremity swelling was noted.  Discussed with Dr. Espinosa's attempt to ambulate patient and see if patient feels better can be discharged from IM standpoint.  If patient unable to ambulate or does not feel better patient will be admitted to the floor.  Patient's daughter at bedside.  Encouraged to follow-up with PCP as soon as able.  My assessment, appears patient was dehydrated and received IV hydration while in the ER.  Creatinine was noted to be 1.6 but this is close to his baseline.  proBNP noted to be elevated without clear baseline, of note patient is a CKD patient and his elevation plus his cardiomegaly could be explained by this.  No evidence of volume overload on my exam.  Patient can follow-up closely with his primary care provider within 7 days of this hospital visit.  He can be discharged with antiemetic if nausea persist.  Daughter at bedside who had discussed this plan with as well as patient.  Both patient daughter agreeable with plan.    Electronically signed by Jae Chandler MD on 7/10/2025 at 7:00 PM

## 2025-07-10 NOTE — PROGRESS NOTES
Orthostatic hypotension: Significant.  - BP readings: 110/68 sitting, 92/58 lying down, 80/48 standing.  - Hold carvedilol if BP <110/80 in the morning.  - Order CBC and CMP.  - Call 911 if symptoms worsen or syncope occurs.    2. Anemia: Symptomatic.  - Pale and symptomatic, likely hemoglobin ~8.  - No active bleeding (no melena or hematuria).  - Blood draw tomorrow to check hemoglobin.  - Consider outpatient transfusion if hemoglobin <6 or active bleeding.    3. Diabetes mellitus: Stable.  - Hemoglobin A1c at 5.7 in 06/2025.    4. Hypertension.  - Managed with carvedilol, no recent changes.  - Monitor BP and hold carvedilol if BP <110/80 in the morning.    Follow-up  - Blood draw tomorrow to check hemoglobin.     While here the patient had a bowel movement and there was no black emesis noted when he vomit.  Also the bowel movement was not dark at all.  He did report that he felt a little better after he vomited but he still weak anemic appearing and orthostatic    Lab Results   Component Value Date    LABA1C 5.7 (H) 06/06/2025     No results found for: \"EAG\"   Abnormal Blood Glucose and Type 2 Diabetes Mellitus: Screening -- Adults aged 40 to 70 years who are overweight or obese Grade: B (Recommended)    BP Readings from Last 3 Encounters:   07/10/25 (!) 80/48   06/17/25 130/86   06/17/25 130/86     High Blood Pressure: Screening and Home Monitoring -- Adults  Grade: A (Recommended) recommends screening for high blood pressure in ages 18 years or older.   obtain measurements outside of the clinical setting for diagnostic confirmation before starting treatment. Annual screening for adults aged 40 years or older or those who are at increased risk for blood pressure    (  ) Colorectal Cancer: Screening --Adults aged 50 to 75 years  Grade: A (Recommended) recommends screening for colorectal cancer starting at age 50 years and continuing until age 75 years.     Lab Results   Component Value Date    CHOL 144 06/06/2025

## 2025-07-10 NOTE — ED NOTES
Patient ambulated with stand-by assistance. Patient tolerated well, but was unsteady on his feet shortly after standing. Patient was able to correct himself and walk with stand-by assist back to his bed without incident. Patient reports he felt good while ambulating. Physician notified.

## 2025-07-11 ENCOUNTER — TELEPHONE (OUTPATIENT)
Dept: PHARMACY | Facility: CLINIC | Age: 80
End: 2025-07-11

## 2025-07-11 ENCOUNTER — CARE COORDINATION (OUTPATIENT)
Dept: CARE COORDINATION | Age: 80
End: 2025-07-11

## 2025-07-11 LAB
EKG ATRIAL RATE: 62 BPM
EKG P AXIS: 46 DEGREES
EKG P-R INTERVAL: 172 MS
EKG Q-T INTERVAL: 436 MS
EKG QRS DURATION: 108 MS
EKG QTC CALCULATION (BAZETT): 442 MS
EKG R AXIS: -21 DEGREES
EKG T AXIS: 124 DEGREES
EKG VENTRICULAR RATE: 62 BPM

## 2025-07-11 PROCEDURE — 93010 ELECTROCARDIOGRAM REPORT: CPT | Performed by: INTERNAL MEDICINE

## 2025-07-11 NOTE — CARE COORDINATION
Care Transitions Note    Initial Call - Call within 2 business days of discharge: Yes    Patient Current Location:  Home: [id number]  [facility] [address]    Care Transition Nurse contacted the patient by telephone to perform post hospital discharge assessment, verified name and  as identifiers.  Provided introduction to self, and explanation of the Care Transition Nurse role.  Patient on speaker phone to allow his daughter Alejandra to participate in the conversation.    Patient: Aamir Garcia    Patient : 1945   MRN: [id number]    Reason for Admission: orthostatic hypotension  Discharge Date: 7/10/25  RURS: No data recorded    Last Discharge Facility       Date Complaint Diagnosis Description Type Department Provider    7/10/25 Dizziness; Nausea; abnormal labs Orthostatic hypotension ... ED (DISCHARGE) J[facility], MD; [facility]...            Was this an external facility discharge? No    Additional needs identified to be addressed with provider   No needs identified             Method of communication with provider: none.    Patients top risk factors for readmission: medical condition-hyper/hypotension, CKD, DM, CAD, immonocompromised, multiple health system providers, and polypharmacy    Interventions to address risk factors:   Education: blood pressure, DM  Referrals: accepted to [facility] pharmacy.     Care Summary Note:   -Patient sent by walk in care and admitted under observation to [facility] on 7/10/25 with symptoms of lightheadedness and N&V.   -Patient reports he \"feels good\" today and rested well last night.  No return of symptoms.  Patient's daughter states they are making sure her father is drinking fluids.  Remarks that his appetite has been decreased for a period of time and he has been losing weight.  -Checks B/P and am/pm BG daily.  This am B/P 126/66 with .  Last A1C on 25 5.7%, on Trulicity.  Insulin as needed.  -No transportation issues.  -Patient/his

## 2025-07-11 NOTE — TELEPHONE ENCOUNTER
CLINICAL PHARMACY NOTE - Population [facility] Pharmacy Referral    Patient can be scheduled with:  Team Schedule- General Referrals, etc.    Received a referral from: Care Coordinator to discuss patient’s medications.       Patient is agreeable to a call from [facility] pharmacy to set up a telephone appointment to review medications.  Polypharmacy.  HTN/CKD/CAD/DM.  Last A1C 5.7% on 6/6/25, on Tresiba.  Insulin as needed.  Thank You         Called patient to schedule a time to speak with a pharmacist over the telephone.   Spoke to patient and advised them of the above message.  Patient verified understanding and scheduled their appointment: tomorrow at 9AM    Ainsley Ramirez CPhT.   [facility] Clinical   [facility] Clinical [facility]  Toll free: [phone] Option 1    For Pharmacy Admin Tracking Only    Program: [facility] [employer]  CPA in place:  No  Recommendation Provided To: Patient/Caregiver: 1 via Telephone  Intervention Detail: Scheduled Appointment  Intervention Accepted By: Patient/Caregiver: 1  Gap Closed?: Yes   Time Spent (min): 15

## 2025-07-14 ENCOUNTER — OFFICE VISIT (OUTPATIENT)
Age: 80
End: 2025-07-14
Payer: MEDICARE

## 2025-07-14 ENCOUNTER — HOSPITAL ENCOUNTER (OUTPATIENT)
Dept: INFUSION THERAPY | Age: 80
Discharge: HOME | End: 2025-07-14
Payer: MEDICARE

## 2025-07-14 VITALS
OXYGEN SATURATION: 100 % | SYSTOLIC BLOOD PRESSURE: 150 MMHG | WEIGHT: 237 LBS | HEART RATE: 76 BPM | HEIGHT: 70 IN | DIASTOLIC BLOOD PRESSURE: 80 MMHG | TEMPERATURE: 97.5 F | BODY MASS INDEX: 33.93 KG/M2

## 2025-07-14 DIAGNOSIS — D64.9 ANEMIA, UNSPECIFIED TYPE: ICD-10-CM

## 2025-07-14 DIAGNOSIS — D64.9 ANEMIA, UNSPECIFIED TYPE: Primary | ICD-10-CM

## 2025-07-14 LAB
BASOPHILS # BLD: 0.03 K/UL (ref 0–0.2)
BASOPHILS NFR BLD: 0 % (ref 0–2)
EOSINOPHIL # BLD: 0.1 K/UL (ref 0.05–0.5)
EOSINOPHILS RELATIVE PERCENT: 1 % (ref 0–6)
ERYTHROCYTE [DISTWIDTH] IN BLOOD BY AUTOMATED COUNT: 13.1 % (ref 11.5–15)
FERRITIN SERPL-MCNC: 218 NG/ML
FOLATE SERPL-MCNC: 14.6 NG/ML (ref 4.6–34.8)
HCT VFR BLD AUTO: 30.4 % (ref 37–54)
HGB BLD-MCNC: 10.3 G/DL (ref 12.5–16.5)
IMM GRANULOCYTES # BLD AUTO: 0.03 K/UL (ref 0–0.58)
IMM GRANULOCYTES NFR BLD: 0 % (ref 0–5)
IRON SATN MFR SERPL: 32 % (ref 20–55)
IRON SERPL-MCNC: 49 UG/DL (ref 61–157)
LYMPHOCYTES NFR BLD: 1.14 K/UL (ref 1.5–4)
LYMPHOCYTES RELATIVE PERCENT: 16 % (ref 20–42)
MCH RBC QN AUTO: 30.7 PG (ref 26–35)
MCHC RBC AUTO-ENTMCNC: 33.9 G/DL (ref 32–34.5)
MCV RBC AUTO: 90.5 FL (ref 80–99.9)
MONOCYTES NFR BLD: 0.46 K/UL (ref 0.1–0.95)
MONOCYTES NFR BLD: 7 % (ref 2–12)
NEUTROPHILS NFR BLD: 75 % (ref 43–80)
NEUTS SEG NFR BLD: 5.19 K/UL (ref 1.8–7.3)
PLATELET # BLD AUTO: 189 K/UL (ref 130–450)
PMV BLD AUTO: 11.1 FL (ref 7–12)
PTH-INTACT SERPL-MCNC: 32 PG/ML (ref 15–65)
RBC # BLD AUTO: 3.36 M/UL (ref 3.8–5.8)
TIBC SERPL-MCNC: 154 UG/DL (ref 250–450)
VIT B12 SERPL-MCNC: 520 PG/ML (ref 232–1245)
WBC OTHER # BLD: 7 K/UL (ref 4.5–11.5)

## 2025-07-14 PROCEDURE — 1159F MED LIST DOCD IN RCRD: CPT | Performed by: INTERNAL MEDICINE

## 2025-07-14 PROCEDURE — 82746 ASSAY OF FOLIC ACID SERUM: CPT

## 2025-07-14 PROCEDURE — 99204 OFFICE O/P NEW MOD 45 MIN: CPT | Performed by: INTERNAL MEDICINE

## 2025-07-14 PROCEDURE — 84630 ASSAY OF ZINC: CPT

## 2025-07-14 PROCEDURE — 82525 ASSAY OF COPPER: CPT

## 2025-07-14 PROCEDURE — 1036F TOBACCO NON-USER: CPT | Performed by: INTERNAL MEDICINE

## 2025-07-14 PROCEDURE — 83550 IRON BINDING TEST: CPT

## 2025-07-14 PROCEDURE — 99203 OFFICE O/P NEW LOW 30 MIN: CPT | Performed by: INTERNAL MEDICINE

## 2025-07-14 PROCEDURE — 1123F ACP DISCUSS/DSCN MKR DOCD: CPT | Performed by: INTERNAL MEDICINE

## 2025-07-14 PROCEDURE — G8427 DOCREV CUR MEDS BY ELIG CLIN: HCPCS | Performed by: INTERNAL MEDICINE

## 2025-07-14 PROCEDURE — 83970 ASSAY OF PARATHORMONE: CPT

## 2025-07-14 PROCEDURE — 3077F SYST BP >= 140 MM HG: CPT | Performed by: INTERNAL MEDICINE

## 2025-07-14 PROCEDURE — 3079F DIAST BP 80-89 MM HG: CPT | Performed by: INTERNAL MEDICINE

## 2025-07-14 PROCEDURE — 85025 COMPLETE CBC W/AUTO DIFF WBC: CPT

## 2025-07-14 PROCEDURE — G8417 CALC BMI ABV UP PARAM F/U: HCPCS | Performed by: INTERNAL MEDICINE

## 2025-07-14 PROCEDURE — 82728 ASSAY OF FERRITIN: CPT

## 2025-07-14 PROCEDURE — 36415 COLL VENOUS BLD VENIPUNCTURE: CPT

## 2025-07-14 PROCEDURE — 83540 ASSAY OF IRON: CPT

## 2025-07-14 PROCEDURE — 82607 VITAMIN B-12: CPT

## 2025-07-14 NOTE — PROGRESS NOTES
Referred by:  Teja Smith, VERENICE - CNP  [address] [place] [address]      Chief Complaint:    Anemia    History of Present Illness:  80M with history of CAD, CHF, HLD, HTN, hypothyroidism, DMII, CKD, here for evluation of anemia.     Had recent ED visit for lightheadedness in s/o several preceding weeks of diarrhea. Improved with IV hydration in ED. Hgb noted to be 9.9 in ED so referred for evaluation.     Has complex cardiac history as well as renal disease. Has relatively frequent episodes of lightheadedness, usually in the morning. Describes black curtain sensation coming over eyes. No total LOC or head injury. Notes blood pressures as low as 80/50 at home; notes that his heart rates, even in these events, remain around 60.     No melena, hematochezia, or other signs of bleeding.     Past Medical History:  Past Medical History:   Diagnosis Date    CAD (coronary artery disease)     Cancer (HCC)     skin    CHF (congestive heart failure) (HCC)     Diabetes mellitus (HCC)     on insulin    Diverticular disease of colon     DJD (degenerative joint disease), multiple sites     GERD (gastroesophageal reflux disease)     Goiter     H/O thyroid nodule     History of heart attack 1987    Hx of malignant neoplasm of prostate     Hyperlipidemia     Hypertension     Hypothyroidism     Ischemic cardiomyopathy     Parotid neoplasm     Status post insertion of drug eluting coronary artery stent 06/18/2021    SVG to OM 1       Medications:    Current Outpatient Medications:     ranolazine (RANEXA) 1000 MG extended release tablet, Take 1 tablet by mouth 2 times daily, Disp: 180 tablet, Rfl: 1    sodium bicarbonate 650 MG tablet, Take 1 tablet by mouth daily, Disp: 90 tablet, Rfl: 1    TRESIBA FLEXTOUCH 200 UNIT/ML SOPN, INJECT 55 UNITS SUBCUTANEOUSLY ONCE DAILY WITH BREAKFAST, Disp: 1 Adjustable Dose Pre-filled Pen Syringe, Rfl: 5    rOPINIRole (REQUIP) 0.5 MG tablet, Take 1 tablet by mouth at bedtime, Disp: 30

## 2025-07-14 NOTE — PROGRESS NOTES
Aamir Garcia  1945 80 y.o.      Referring Physician: Dr. Smith     PCP: Teja Smith, VERENICE - CNP    There were no vitals filed for this visit.     Wt Readings from Last 3 Encounters:   07/14/25 107.5 kg (237 lb)   07/10/25 104.3 kg (230 lb)   07/10/25 104.6 kg (230 lb 9.6 oz)        There is no height or weight on file to calculate BMI.          Chief Complaint: No chief complaint on file.              Current Outpatient Medications:     ranolazine (RANEXA) 1000 MG extended release tablet, Take 1 tablet by mouth 2 times daily, Disp: 180 tablet, Rfl: 1    sodium bicarbonate 650 MG tablet, Take 1 tablet by mouth daily, Disp: 90 tablet, Rfl: 1    TRESIBA FLEXTOUCH 200 UNIT/ML SOPN, INJECT 55 UNITS SUBCUTANEOUSLY ONCE DAILY WITH BREAKFAST, Disp: 1 Adjustable Dose Pre-filled Pen Syringe, Rfl: 5    rOPINIRole (REQUIP) 0.5 MG tablet, Take 1 tablet by mouth at bedtime, Disp: 30 tablet, Rfl: 3    carvedilol (COREG) 25 MG tablet, TAKE 1 TABLET BY MOUTH TWICE DAILY WITH MEALS, Disp: 180 tablet, Rfl: 1    tiZANidine (ZANAFLEX) 4 MG tablet, Take 1 tablet by mouth 3 times daily as needed (neck spasms), Disp: 30 tablet, Rfl: 0    levothyroxine (SYNTHROID) 50 MCG tablet, Take 1 tablet by mouth once daily, Disp: 90 tablet, Rfl: 1    rosuvastatin (CRESTOR) 40 MG tablet, Take 1 tablet by mouth every evening, Disp: 90 tablet, Rfl: 1    blood glucose test strips (ONETOUCH ULTRA) strip, USE 1 STRIP TO CHECK GLUCOSE TWICE DAILY, Disp: 100 each, Rfl: 1    fluticasone (FLONASE) 50 MCG/ACT nasal spray, 1 spray by Each Nostril route daily, Disp: 16 g, Rfl: 0    tamsulosin (FLOMAX) 0.4 MG capsule, Take 1 capsule by mouth in the morning and at bedtime, Disp: 180 capsule, Rfl: 1    insulin lispro protamine & lispro (HUMALOG MIX) (75-25) 100 UNIT per ML SUSP injection vial, Inject 100 Units into the skin as needed, Disp: , Rfl:     B Complex-Biotin-FA (B COMPLETE) TABS, Take by mouth, Disp: , Rfl:     APPLE CIDER VINEGAR PO, Take  Worried About Running Out of Food in the Last Year: Never true     Ran Out of Food in the Last Year: Never true   Transportation Needs: No Transportation Needs (7/10/2025)    PRAPARE - Transportation     Lack of Transportation (Medical): No     Lack of Transportation (Non-Medical): No   Physical Activity: Inactive (6/17/2025)    Exercise Vital Sign     Days of Exercise per Week: 0 days     Minutes of Exercise per Session: 0 min   Stress: Not on file   Social Connections: Not on file   Intimate Partner Violence: Not on file   Housing Stability: Low Risk  (7/10/2025)    Housing Stability Vital Sign     Unable to Pay for Housing in the Last Year: No     Number of Times Moved in the Last Year: 0     Homeless in the Last Year: No           Occupation:   Retired:  YES: 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          REVIEW OF SYSTEMS:     Pacemaker/Defibulator/ICD:  No    Mediport: No           FALLS RISK SCREENING ASSESSMENT    Instructions:  Assess the patient and Venetie the appropriate indicators that are present for fall risk identification.   Total the numbers circled and assign a fall risk score from Table 2.  Reassess patient at a minimum every 12 weeks or with status change.    Assessment   Date  7/14/2025     1.  Mental Ability: confusion/cognitively impaired No - 0       2.  Elimination Issues: incontinence, frequency Yes - 3       3.  Ambulatory: use of assistive devices (walker, cane, off-loading devices), attached to equipment (IV pole, oxygen) No - 0     4.  Sensory Limitations: dizziness, vertigo, impaired vision Yes - 3       5.  Age 65 years or greater - 1       6.  Medication: diuretics, strong analgesics, hypnotics, sedatives, antihypertensive agents   No - 0   7.  Falls:  recent history of falls within the last 3 months (not to include slipping or tripping)   Yes - 7   TOTAL 14    If score of 4 or greater was education given? Yes       TABLE 2   Risk Score Risk Level Plan of Care   0-3 Little or  No

## 2025-07-15 ENCOUNTER — OFFICE VISIT (OUTPATIENT)
Dept: ORTHOPEDIC SURGERY | Age: 80
End: 2025-07-15

## 2025-07-15 VITALS
TEMPERATURE: 97.6 F | DIASTOLIC BLOOD PRESSURE: 76 MMHG | SYSTOLIC BLOOD PRESSURE: 170 MMHG | HEART RATE: 56 BPM | BODY MASS INDEX: 33.93 KG/M2 | HEIGHT: 70 IN | OXYGEN SATURATION: 98 % | WEIGHT: 237 LBS | RESPIRATION RATE: 18 BRPM

## 2025-07-15 DIAGNOSIS — S62.502D CLOSED NONDISPLACED FRACTURE OF PHALANX OF LEFT THUMB WITH ROUTINE HEALING, UNSPECIFIED PHALANX, SUBSEQUENT ENCOUNTER: Primary | ICD-10-CM

## 2025-07-15 DIAGNOSIS — M79.645 PAIN OF FINGER OF LEFT HAND: ICD-10-CM

## 2025-07-15 DIAGNOSIS — S52.501A CLOSED FRACTURE OF DISTAL END OF RIGHT RADIUS, UNSPECIFIED FRACTURE MORPHOLOGY, INITIAL ENCOUNTER: ICD-10-CM

## 2025-07-15 NOTE — PROGRESS NOTES
[facility]  ORTHOPAEDICS    Follow Up Visit     CHIEF COMPLAINT:   Chief Complaint   Patient presents with    Follow-up     Lt hand thumb finger  and wrist    doing         Aamir Garcia returns today for follow up visit regarding right distal radius fracture and left distal phalanx proximal phalanx thumb fractures.  He reports 60% improved since his last visit for both fractures    History of Present Illness  The patient presents for evaluation of a healing right distal radius fracture of the lunate facet and healing left distal phalanx and proximal phalanx fractures of the thumb.    He reports experiencing mild soreness in his right wrist, which he attributes to the healing process. He has been utilizing a removable wrist brace for support but forgot to bring it to today's appointment.      The patient presents for evaluation of thumb and wrist pain.    He reports persistent soreness in his thumb and wrist, although he notes a significant improvement of approximately 60 percent. He is currently supplementing with vitamin D.    Physical Exam:     Height: 1.778 m (5' 10\"), Weight - Scale: 107.5 kg (237 lb), BP: (!) 170/76    General: Alert and oriented x3, no acute distress  Cardiovascular/pulmonary: No labored breathing, peripheral perfusion intact  Musculoskeletal:    Physical Exam  Musculoskeletal:  Right upper extremity: Skin intact. Compartment soft and compressible. +2 radial pulse. Sensation intact to light touch, median, radial, ulnar nerves. Positive PIN, ulnar nerve function. Slight soreness with passive and active range of motion with wrist extension, flexion, ulnar and radial deviation. No tenderness to palpation with squeezing of the distal radius fracture site.  Left upper extremity (thumb): Brisk capillary refill present. Nail bed and nail healing nicely. Minimal swelling compared to prior visits. No tenderness to palpation with squeezing of the distal phalanx or proximal phalanx. Collateral

## 2025-07-17 ENCOUNTER — TELEPHONE (OUTPATIENT)
Dept: PRIMARY CARE CLINIC | Age: 80
End: 2025-07-17

## 2025-07-17 ENCOUNTER — OFFICE VISIT (OUTPATIENT)
Dept: PRIMARY CARE CLINIC | Age: 80
End: 2025-07-17

## 2025-07-17 VITALS
TEMPERATURE: 97.9 F | WEIGHT: 238 LBS | BODY MASS INDEX: 34.07 KG/M2 | DIASTOLIC BLOOD PRESSURE: 55 MMHG | OXYGEN SATURATION: 99 % | SYSTOLIC BLOOD PRESSURE: 112 MMHG | RESPIRATION RATE: 20 BRPM | HEIGHT: 70 IN | HEART RATE: 55 BPM

## 2025-07-17 DIAGNOSIS — Z79.4 TYPE 2 DIABETES MELLITUS WITH STAGE 3A CHRONIC KIDNEY DISEASE, WITH LONG-TERM CURRENT USE OF INSULIN (HCC): ICD-10-CM

## 2025-07-17 DIAGNOSIS — I11.0 HYPERTENSIVE HEART DISEASE WITH HEART FAILURE (HCC): ICD-10-CM

## 2025-07-17 DIAGNOSIS — K21.9 GASTROESOPHAGEAL REFLUX DISEASE WITHOUT ESOPHAGITIS: ICD-10-CM

## 2025-07-17 DIAGNOSIS — I25.10 CORONARY ARTERY DISEASE INVOLVING NATIVE CORONARY ARTERY OF NATIVE HEART WITHOUT ANGINA PECTORIS: ICD-10-CM

## 2025-07-17 DIAGNOSIS — N18.31 TYPE 2 DIABETES MELLITUS WITH STAGE 3A CHRONIC KIDNEY DISEASE, WITH LONG-TERM CURRENT USE OF INSULIN (HCC): ICD-10-CM

## 2025-07-17 DIAGNOSIS — E03.9 ACQUIRED HYPOTHYROIDISM: ICD-10-CM

## 2025-07-17 DIAGNOSIS — E11.22 TYPE 2 DIABETES MELLITUS WITH STAGE 3A CHRONIC KIDNEY DISEASE, WITH LONG-TERM CURRENT USE OF INSULIN (HCC): ICD-10-CM

## 2025-07-17 DIAGNOSIS — I10 PRIMARY HYPERTENSION: Primary | ICD-10-CM

## 2025-07-17 DIAGNOSIS — R14.0 ABDOMINAL BLOATING: ICD-10-CM

## 2025-07-17 DIAGNOSIS — R11.0 NAUSEA: ICD-10-CM

## 2025-07-17 DIAGNOSIS — E21.3 HYPERPARATHYROIDISM: ICD-10-CM

## 2025-07-17 DIAGNOSIS — K59.1 FUNCTIONAL DIARRHEA: ICD-10-CM

## 2025-07-17 DIAGNOSIS — I10 PRIMARY HYPERTENSION: ICD-10-CM

## 2025-07-17 DIAGNOSIS — I65.21 STENOSIS OF RIGHT CAROTID ARTERY: ICD-10-CM

## 2025-07-17 LAB
ALBUMIN: 3 G/DL (ref 3.5–5.2)
ALP BLD-CCNC: 88 U/L (ref 40–129)
ALT SERPL-CCNC: 11 U/L (ref 0–50)
ANION GAP SERPL CALCULATED.3IONS-SCNC: 13 MMOL/L (ref 7–16)
AST SERPL-CCNC: 27 U/L (ref 0–50)
BASOPHILS ABSOLUTE: 0.03 K/UL (ref 0–0.2)
BASOPHILS RELATIVE PERCENT: 0 % (ref 0–2)
BILIRUB SERPL-MCNC: 0.3 MG/DL (ref 0–1.2)
BUN BLDV-MCNC: 14 MG/DL (ref 8–23)
CALCIUM SERPL-MCNC: 8.9 MG/DL (ref 8.8–10.2)
CHLORIDE BLD-SCNC: 104 MMOL/L (ref 98–107)
CO2: 24 MMOL/L (ref 22–29)
COPPER SERPL-MCNC: 103.3 UG/DL (ref 70–140)
CREAT SERPL-MCNC: 1.4 MG/DL (ref 0.7–1.2)
EOSINOPHILS ABSOLUTE: 0.14 K/UL (ref 0.05–0.5)
EOSINOPHILS RELATIVE PERCENT: 2 % (ref 0–6)
GFR, ESTIMATED: 50 ML/MIN/1.73M2
GLUCOSE BLD-MCNC: 83 MG/DL (ref 74–99)
HCT VFR BLD CALC: 31.7 % (ref 37–54)
HEMOGLOBIN: 10.1 G/DL (ref 12.5–16.5)
IMMATURE GRANULOCYTES %: 0 % (ref 0–5)
IMMATURE GRANULOCYTES ABSOLUTE: <0.03 K/UL (ref 0–0.58)
LYMPHOCYTES ABSOLUTE: 1.28 K/UL (ref 1.5–4)
LYMPHOCYTES RELATIVE PERCENT: 18 % (ref 20–42)
MCH RBC QN AUTO: 30.6 PG (ref 26–35)
MCHC RBC AUTO-ENTMCNC: 31.9 G/DL (ref 32–34.5)
MCV RBC AUTO: 96.1 FL (ref 80–99.9)
MONOCYTES ABSOLUTE: 0.43 K/UL (ref 0.1–0.95)
MONOCYTES RELATIVE PERCENT: 6 % (ref 2–12)
NEUTROPHILS ABSOLUTE: 5.23 K/UL (ref 1.8–7.3)
NEUTROPHILS RELATIVE PERCENT: 74 % (ref 43–80)
NT PRO BNP: 5299 PG/ML (ref 0–450)
PDW BLD-RTO: 13.5 % (ref 11.5–15)
PLATELET # BLD: 203 K/UL (ref 130–450)
PMV BLD AUTO: 11.5 FL (ref 7–12)
POTASSIUM SERPL-SCNC: 4 MMOL/L (ref 3.5–5.1)
RBC # BLD: 3.3 M/UL (ref 3.8–5.8)
SODIUM BLD-SCNC: 141 MMOL/L (ref 136–145)
TOTAL PROTEIN: 6.3 G/DL (ref 6.4–8.3)
WBC # BLD: 7.1 K/UL (ref 4.5–11.5)
ZINC SERPL-MCNC: 47.5 UG/DL (ref 60–120)

## 2025-07-17 RX ORDER — OMEGA-3-ACID ETHYL ESTERS 1 G/1
CAPSULE, LIQUID FILLED ORAL
COMMUNITY
End: 2025-07-17

## 2025-07-17 ASSESSMENT — ENCOUNTER SYMPTOMS
ABDOMINAL PAIN: 1
COUGH: 0
DIARRHEA: 0
TROUBLE SWALLOWING: 0
VOMITING: 0
WHEEZING: 0
SHORTNESS OF BREATH: 0
VOICE CHANGE: 0
NAUSEA: 0
BLOOD IN STOOL: 0
BACK PAIN: 1
CHEST TIGHTNESS: 0
CONSTIPATION: 0

## 2025-07-17 NOTE — PROGRESS NOTES
Aamir Garcia : 1945 Sex: male  Age: 80 y.o.    Chief Complaint   Patient presents with    Follow-up     Follow up from Syed Antonio on July 10 seen for anemia and nausea and bp issues , needs to see Cardiologist sooner then Aug 1st. Needs a call from provider he needs seen sooner and they will get him in sooner , seen hematologist does not think it is a blood issue problem , so heart is the next step.        Aamir was seen today for follow-up.    Diagnoses and all orders for this visit:    Primary hypertension  -     CBC with Auto Differential; Future  -     Comprehensive Metabolic Panel; Future  -     Brain Natriuretic Peptide; Future  -     [facility], [place]    Stenosis of right carotid artery  -     [facility] Heart Failure [facility] [place]    Coronary artery disease involving native coronary artery of native heart without angina pectoris  -     [facility] Heart Failure [facility] [place]    Hypertensive heart disease with heart failure (HCC)  -     CBC with Auto Differential; Future  -     Comprehensive Metabolic Panel; Future  -     Brain Natriuretic Peptide; [facility]  -     [facility] Heart Failure [facility] [place]    Gastroesophageal reflux disease without esophagitis    Type 2 diabetes mellitus with stage 3a chronic kidney disease, with long-term current use of insulin ([facility])  -     [facility] Heart Failure [facility] [place]    Hyperparathyroidism    Acquired hypothyroidism  -     [facility]    Abdominal bloating  -     Viktoriya Cox MD, General [facility]    Functional diarrhea  -     Viktoriya Cox MD, [facility]    Nausea  -     [facility]        Assessment & Plan    LAST VISIT    2. Anemia: Chronic.  - Hemoglobin 10.8 in 2025, previously 11 in 2024 and 11.1 on 2024.  - Referral to hematology for

## 2025-07-17 NOTE — TELEPHONE ENCOUNTER
Ilda from [facility] called about the referral.  She stated that she wanted to know why you wanted him to come to them since patient has a Cardiologist at [facility].  They stated they do not have Physician there at facility.   She has no problem reaching out to [facility] but wanted more information.    Call call them back at [phone]

## 2025-07-18 ENCOUNTER — RESULTS FOLLOW-UP (OUTPATIENT)
Dept: PRIMARY CARE CLINIC | Age: 80
End: 2025-07-18

## 2025-07-18 NOTE — TELEPHONE ENCOUNTER
----- Message from VERENICE Mojica - CNP sent at 7/18/2025  8:17 AM EDT -----  PLEASE CALL PATIENT AND DISCUSS THE RESULTS WITH THEM  NO NEW ORDERS  proBNP is still going up already talked to the CHF clinic who is talking to Dr. Tavarez and they will get him worked in please let the patient know

## 2025-07-18 NOTE — TELEPHONE ENCOUNTER
Patient cancelled appointment himself.  Will outreach to reschedule.    Ainsley Ramirez CPhT.   Population Health Clinical   [facility] Clinical Pharmacy  Toll free: [phone] Option 1

## 2025-07-21 ENCOUNTER — OFFICE VISIT (OUTPATIENT)
Age: 80
End: 2025-07-21
Payer: MEDICARE

## 2025-07-21 VITALS
OXYGEN SATURATION: 100 % | BODY MASS INDEX: 33.93 KG/M2 | DIASTOLIC BLOOD PRESSURE: 73 MMHG | HEIGHT: 70 IN | TEMPERATURE: 97.4 F | WEIGHT: 237 LBS | HEART RATE: 65 BPM | SYSTOLIC BLOOD PRESSURE: 138 MMHG

## 2025-07-21 DIAGNOSIS — N18.30 ANEMIA IN STAGE 3 CHRONIC KIDNEY DISEASE, UNSPECIFIED WHETHER STAGE 3A OR 3B CKD (HCC): Primary | ICD-10-CM

## 2025-07-21 DIAGNOSIS — I50.23 ACUTE ON CHRONIC SYSTOLIC HEART FAILURE (HCC): ICD-10-CM

## 2025-07-21 DIAGNOSIS — D63.1 ANEMIA IN STAGE 3 CHRONIC KIDNEY DISEASE, UNSPECIFIED WHETHER STAGE 3A OR 3B CKD (HCC): Primary | ICD-10-CM

## 2025-07-21 PROCEDURE — G8427 DOCREV CUR MEDS BY ELIG CLIN: HCPCS | Performed by: INTERNAL MEDICINE

## 2025-07-21 PROCEDURE — G8417 CALC BMI ABV UP PARAM F/U: HCPCS | Performed by: INTERNAL MEDICINE

## 2025-07-21 PROCEDURE — 1123F ACP DISCUSS/DSCN MKR DOCD: CPT | Performed by: INTERNAL MEDICINE

## 2025-07-21 PROCEDURE — 3078F DIAST BP <80 MM HG: CPT | Performed by: INTERNAL MEDICINE

## 2025-07-21 PROCEDURE — G2211 COMPLEX E/M VISIT ADD ON: HCPCS | Performed by: INTERNAL MEDICINE

## 2025-07-21 PROCEDURE — 1159F MED LIST DOCD IN RCRD: CPT | Performed by: INTERNAL MEDICINE

## 2025-07-21 PROCEDURE — 3075F SYST BP GE 130 - 139MM HG: CPT | Performed by: INTERNAL MEDICINE

## 2025-07-21 PROCEDURE — 99214 OFFICE O/P EST MOD 30 MIN: CPT | Performed by: INTERNAL MEDICINE

## 2025-07-21 PROCEDURE — 99213 OFFICE O/P EST LOW 20 MIN: CPT | Performed by: INTERNAL MEDICINE

## 2025-07-21 PROCEDURE — 1036F TOBACCO NON-USER: CPT | Performed by: INTERNAL MEDICINE

## 2025-07-21 NOTE — PROGRESS NOTES
Patient provided discharge AVS, all questions answered  
for this. Counseled that ongoing symptoms most likely relate to heart failure.     Zinc levels are low. Recommend increasing zinc supplement to two tabs once daily for 1 month to aid supplementation.     Follow up in 3 months to monitor clinical progress and evaluation.     #CAD, CHF  #HLD  #HTN  #DMII  Briefly reviewed chronic diagnoses and management.     This is a follow-up visit for ongoing hematology evaluation and need.  I serve as the primary point of care for ongoing anemia-related needs.    [facility] supports Level 4 patient billing (35338).      Vinay Gee MD

## 2025-07-21 NOTE — PATIENT INSTRUCTIONS
Take two of your over the counter zinc capsules a day for 1 month.     Please get CBC, BMP, iron studies, and zinc within 1 week of our follow up.

## 2025-07-22 ENCOUNTER — TELEPHONE (OUTPATIENT)
Dept: PHARMACY | Facility: CLINIC | Age: 80
End: 2025-07-22

## 2025-07-22 DIAGNOSIS — Z79.899 ENCOUNTER FOR MEDICATION REVIEW: Primary | ICD-10-CM

## 2025-07-22 PROCEDURE — 1111F DSCHRG MED/CURRENT MED MERGE: CPT

## 2025-07-22 RX ORDER — INSULIN LISPRO 100 [IU]/ML
INJECTION, SOLUTION INTRAVENOUS; SUBCUTANEOUS
COMMUNITY

## 2025-07-22 RX ORDER — RED BEET 500 MG
1 CAPSULE ORAL DAILY
COMMUNITY

## 2025-07-22 NOTE — TELEPHONE ENCOUNTER
[facility] CLINICAL PHARMACY REVIEW: Post-Discharge Transitions of Care ([facility])  Subjective/Objective:  Aamir Garcia is a 80 y.o. male. Patient was discharged from [facility] Emergency Department on 7/10/25 with a diagnosis of orthostatic hypotension, anemia.    Patient outreach to review discharge medications and provide medication review and management.    Spoke with patient.    Per heme/onc recent visit: \"Zinc levels are low. Recommend increasing zinc supplement to two tabs once daily for 1 month to aid supplementation. \"    BP this morning 110/65    Blood sugars, checking twice a day. Fastin    Patient is taking a lot of supplements. A lot of which can affect BP, HR, thinness of the blood.     No Known Allergies    Discharge Medications (as per discharging medication list found in AVS):  There are NEW medications for you. START taking them after you leave the hospital:  na  You told us you were taking these medications at home, but the amount or how often you take this medication has CHANGED:  na  These are medications you told us you were taking at home, CONTINUE taking them after you leave the hospital:  Current Outpatient Medications   Medication Sig    ranolazine (RANEXA) 1000 MG extended release tablet Take 1 tablet by mouth 2 times daily    sodium bicarbonate 650 MG tablet Take 1 tablet by mouth daily    TRESIBA FLEXTOUCH 200 UNIT/ML SOPN INJECT 55 UNITS SUBCUTANEOUSLY ONCE DAILY WITH BREAKFAST    rOPINIRole (REQUIP) 0.5 MG tablet Take 1 tablet by mouth at bedtime    carvedilol (COREG) 25 MG tablet TAKE 1 TABLET BY MOUTH TWICE DAILY WITH MEALS (Patient taking differently: Take 0.5 tablets by mouth 2 times daily (with meals))  States doctor told him to cut it in half since pulse was low. But for the last few days he held it due to low pulse and dizziness    levothyroxine (SYNTHROID) 50 MCG tablet Take 1 tablet by mouth once daily    rosuvastatin (CRESTOR) 40 MG

## 2025-07-25 ENCOUNTER — CARE COORDINATION (OUTPATIENT)
Dept: CARE COORDINATION | Age: 80
End: 2025-07-25

## 2025-07-25 ENCOUNTER — HOSPITAL ENCOUNTER (OUTPATIENT)
Dept: OTHER | Age: 80
Setting detail: THERAPIES SERIES
Discharge: HOME | End: 2025-07-25
Payer: MEDICARE

## 2025-07-25 VITALS
SYSTOLIC BLOOD PRESSURE: 148 MMHG | DIASTOLIC BLOOD PRESSURE: 82 MMHG | BODY MASS INDEX: 33.72 KG/M2 | RESPIRATION RATE: 18 BRPM | WEIGHT: 235 LBS | HEART RATE: 57 BPM | OXYGEN SATURATION: 98 %

## 2025-07-25 LAB
ANION GAP SERPL CALCULATED.3IONS-SCNC: 10 MMOL/L (ref 7–16)
BNP SERPL-MCNC: 3123 PG/ML (ref 0–450)
BUN SERPL-MCNC: 18 MG/DL (ref 8–23)
CALCIUM SERPL-MCNC: 8.8 MG/DL (ref 8.8–10.2)
CHLORIDE SERPL-SCNC: 103 MMOL/L (ref 98–107)
CO2 SERPL-SCNC: 25 MMOL/L (ref 22–29)
CREAT SERPL-MCNC: 1.3 MG/DL (ref 0.7–1.2)
GFR, ESTIMATED: 56 ML/MIN/1.73M2
GLUCOSE SERPL-MCNC: 95 MG/DL (ref 74–99)
POTASSIUM SERPL-SCNC: 4.5 MMOL/L (ref 3.5–5.1)
SODIUM SERPL-SCNC: 138 MMOL/L (ref 136–145)

## 2025-07-25 PROCEDURE — 99205 OFFICE O/P NEW HI 60 MIN: CPT

## 2025-07-25 PROCEDURE — 83880 ASSAY OF NATRIURETIC PEPTIDE: CPT

## 2025-07-25 PROCEDURE — 36415 COLL VENOUS BLD VENIPUNCTURE: CPT

## 2025-07-25 PROCEDURE — 96374 THER/PROPH/DIAG INJ IV PUSH: CPT

## 2025-07-25 PROCEDURE — 80048 BASIC METABOLIC PNL TOTAL CA: CPT

## 2025-07-25 RX ORDER — FUROSEMIDE 10 MG/ML
INJECTION INTRAMUSCULAR; INTRAVENOUS
Status: DISPENSED
Start: 2025-07-25 | End: 2025-07-25

## 2025-07-25 RX ORDER — FUROSEMIDE 10 MG/ML
40 INJECTION INTRAMUSCULAR; INTRAVENOUS ONCE
Status: DISCONTINUED | OUTPATIENT
Start: 2025-07-25 | End: 2025-07-26 | Stop reason: HOSPADM

## 2025-07-25 RX ORDER — SODIUM CHLORIDE 0.9 % (FLUSH) 0.9 %
10 SYRINGE (ML) INJECTION ONCE
Status: DISCONTINUED | OUTPATIENT
Start: 2025-07-25 | End: 2025-07-26 | Stop reason: HOSPADM

## 2025-07-25 NOTE — CARE COORDINATION
Care Transitions Note    Follow Up Call     Patient Current Location:  Home: [id number]  [facility] [id number]    Care Transition Nurse contacted the patient by telephone. Verified name and  as identifiers.    Additional needs identified to be addressed with provider   No needs identified                 Method of communication with provider: none.    Care Summary Note:   -Patient does not feel he has urinated anymore than he usually does after receiving the IV Lasix earlier today.  Having no SOB.  Admits to feeling full after eating a small amount.  -Patient denies any needs at this time and is agreeable to continued follow up from care transitions.     Plan of care updates since last contact:  Referrals: completed [facility] telephone appointment   Completed oncology, ortho, PCP TCM/HFU appointments.  Established with [facility] clinic today-mildly hypervolemic.     Advance Care Planning:   Does patient have an Advance Directive: health care decision maker confirmed on a prior call.    Medication Review:  Medication review was performed with [facility] pharmacy on 25,1111F entered: yes.  Received IV Lasix 40 mg today at [facility] clinic.    Remote Patient Monitoring:  Offered patient enrollment in the Remote Patient Monitoring (RPM) program for in-home monitoring: Yes, but did not enroll at this time: already monitoring with home equipment.    Assessments:  Care Transitions Subsequent and Final Call    Subsequent and Final Calls  Do you have any ongoing symptoms?: No  Have your medications changed?: No  Do you have any questions related to your medications?: No  Do you currently have any active services?: Yes  Are you currently active with any services?: Outpatient/Community Services  Do you have any needs or concerns that I can assist you with?: No  Identified Barriers: Lack of Education  Care Transitions Interventions    Pharmacist: Completed    Other Interventions:              Follow Up

## 2025-07-25 NOTE — PROGRESS NOTES
[facility] Failure Clinic   [facility]      Referring Provider: PCP   Primary Care Physician: Teja Smith APRN - CNP   Cardiologist: Dr. Man  Nephrologist: [facility]      HISTORY OF PRESENT ILLNESS:    Aamir Garcia is a 80 y.o. (1945) male with a history of HFpEF (EF> 50%)    He presents to the CHF clinic for ongoing evaluation and monitoring of heart failure.  In the CHF clinic today he denies any adverse symptoms except:  [] Dizziness or lightheadedness   [] Syncope or near syncope  [] Recent Fall  [] Shortness of breath at rest   [] Dyspnea with exertion  [] Decline in functional capacity (unable to perform activities they had previously been able to do)  [x] Fatigue   [] Orthopnea  [] PND  [] Nocturnal cough  [] Hemoptysis  [] Chest pain, pressure, or discomfort  [] Palpitations  [] Abdominal distention  [x] Abdominal bloating  [x] Early satiety  [] Blood in stool   [] Diarrhea  [] Constipation  [] Nausea/Vomiting  [] Decreased urinary response to oral diuretic   [] Scrotal swelling   [x] Lower extremity edema  [] Used PRN doses of oral diuretic   [] Weight gain    Wt Readings from Last 3 Encounters:   07/25/25 106.6 kg (235 lb)   07/21/25 107.5 kg (237 lb)   07/17/25 108 kg (238 lb)       SOCIAL HISTORY:  [x] Denies tobacco, alcohol or illicit drug abuse  [] Tobacco use:  [] ETOH use:  [] Illicit drug use:        MEDICATIONS:    No Known Allergies  Prior to Visit Medications    Medication Sig Taking? Authorizing Provider   Misc Natural Products (BEET ROOT) 500 MG CAPS Take 1 capsule by mouth daily Yes ProviderRene MD   Probiotic Product (FORTIFY DAILY PROBIOTIC PO) Take 1 capsule by mouth nightly Yes ProviderRene MD   insulin lispro, 1 Unit Dial, (HUMALOG KWIKPEN) 100 UNIT/ML SOPN Inject into the skin 3 time daily with meals as needed following SS Yes Rene Echols MD   ranolazine (RANEXA) 1000 MG

## 2025-08-01 ENCOUNTER — CARE COORDINATION (OUTPATIENT)
Dept: CARE COORDINATION | Age: 80
End: 2025-08-01

## 2025-08-01 NOTE — CARE COORDINATION
Care Transitions Note    Follow Up Call     Patient Current Location:  Home: [id number]  [facility] [id number]    Care Transition Nurse contacted the patient by telephone. Verified name and  as identifiers.    Additional needs identified to be addressed with provider   Standard priority: inform PCP of dosage change of the Coreg done by cardiology (Dr Man) earlier today                 Method of communication with provider: chart routing.    Care Summary Note:   -Patient's wife Georgia reports episodes of dizziness and decreased B/P so cardiology lowered the dose of Coreg.  SBP today 112 which is improved.  Georgia remarks recently SBP was 100 and her  took the Coreg anyway and became basically immobile having to be carried by his daughter.  -Having no SOB, remains with the early satiety.  CTN informed Georgia that as per EMR general surgery did attempt to reach on 25 and left message (referral placed by PCP on 25, abdominal bloating, etc)  Georgia requesting CTN to text phone number of [facility] to her as she is currently outside.  -Patient's wife denies any further needs at this time and is agreeable to final call from care transitions next outreach.     Plan of care updates since last contact:  Completed cardiology appointment Dr Man) earlier today        Advance Care Planning:   Does patient have an Advance Directive: health care decision maker confirmed on a prior call.    Medication Review:  Medications changed since last call, reviewed today.   Coreg decreased to 6.25 mg by cardiology.    Remote Patient Monitoring:  Offered patient enrollment in the Remote Patient Monitoring (RPM) program for in-home monitoring: Yes, but did not enroll at this time: already monitoring with home equipment.    Assessments:  Care Transitions Subsequent and Final Call    Subsequent and Final Calls  Have your medications changed?: Yes  Patient Reports: 25-see note  Do you

## 2025-08-04 DIAGNOSIS — E11.22 TYPE 2 DIABETES MELLITUS WITH STAGE 3B CHRONIC KIDNEY DISEASE, WITH LONG-TERM CURRENT USE OF INSULIN (HCC): ICD-10-CM

## 2025-08-04 DIAGNOSIS — Z79.4 TYPE 2 DIABETES MELLITUS WITH STAGE 3B CHRONIC KIDNEY DISEASE, WITH LONG-TERM CURRENT USE OF INSULIN (HCC): ICD-10-CM

## 2025-08-04 DIAGNOSIS — N18.32 TYPE 2 DIABETES MELLITUS WITH STAGE 3B CHRONIC KIDNEY DISEASE, WITH LONG-TERM CURRENT USE OF INSULIN (HCC): ICD-10-CM

## 2025-08-04 RX ORDER — BLOOD SUGAR DIAGNOSTIC
STRIP MISCELLANEOUS
Qty: 100 EACH | Refills: 5 | Status: SHIPPED | OUTPATIENT
Start: 2025-08-04

## 2025-08-08 ENCOUNTER — CARE COORDINATION (OUTPATIENT)
Dept: CARE COORDINATION | Age: 80
End: 2025-08-08

## 2025-08-29 ENCOUNTER — HOSPITAL ENCOUNTER (OUTPATIENT)
Dept: OTHER | Age: 80
Setting detail: THERAPIES SERIES
Discharge: HOME | End: 2025-08-29
Payer: MEDICARE

## 2025-08-29 VITALS
SYSTOLIC BLOOD PRESSURE: 130 MMHG | WEIGHT: 235 LBS | HEART RATE: 60 BPM | DIASTOLIC BLOOD PRESSURE: 70 MMHG | OXYGEN SATURATION: 98 % | BODY MASS INDEX: 33.72 KG/M2 | RESPIRATION RATE: 18 BRPM

## 2025-08-29 LAB
ANION GAP SERPL CALCULATED.3IONS-SCNC: 12 MMOL/L (ref 7–16)
BNP SERPL-MCNC: 5808 PG/ML (ref 0–450)
BUN SERPL-MCNC: 21 MG/DL (ref 8–23)
CALCIUM SERPL-MCNC: 8.1 MG/DL (ref 8.8–10.2)
CHLORIDE SERPL-SCNC: 104 MMOL/L (ref 98–107)
CO2 SERPL-SCNC: 23 MMOL/L (ref 22–29)
CREAT SERPL-MCNC: 1.8 MG/DL (ref 0.7–1.2)
GFR, ESTIMATED: 38 ML/MIN/1.73M2
GLUCOSE SERPL-MCNC: 116 MG/DL (ref 74–99)
POTASSIUM SERPL-SCNC: 4 MMOL/L (ref 3.5–5.1)
SODIUM SERPL-SCNC: 138 MMOL/L (ref 136–145)

## 2025-08-29 PROCEDURE — 6360000002 HC RX W HCPCS

## 2025-08-29 PROCEDURE — 99204 OFFICE O/P NEW MOD 45 MIN: CPT

## 2025-08-29 PROCEDURE — 80048 BASIC METABOLIC PNL TOTAL CA: CPT

## 2025-08-29 PROCEDURE — 83880 ASSAY OF NATRIURETIC PEPTIDE: CPT

## 2025-08-29 PROCEDURE — 2500000003 HC RX 250 WO HCPCS: Performed by: INTERNAL MEDICINE

## 2025-08-29 PROCEDURE — 96374 THER/PROPH/DIAG INJ IV PUSH: CPT

## 2025-08-29 PROCEDURE — 36415 COLL VENOUS BLD VENIPUNCTURE: CPT

## 2025-08-29 RX ORDER — FUROSEMIDE 10 MG/ML
40 INJECTION INTRAMUSCULAR; INTRAVENOUS ONCE
Status: COMPLETED | OUTPATIENT
Start: 2025-08-29 | End: 2025-08-29

## 2025-08-29 RX ORDER — SODIUM CHLORIDE 0.9 % (FLUSH) 0.9 %
10 SYRINGE (ML) INJECTION ONCE
Status: COMPLETED | OUTPATIENT
Start: 2025-08-29 | End: 2025-08-29

## 2025-08-29 RX ORDER — FUROSEMIDE 10 MG/ML
INJECTION INTRAMUSCULAR; INTRAVENOUS
Status: COMPLETED
Start: 2025-08-29 | End: 2025-08-29

## 2025-08-29 RX ADMIN — FUROSEMIDE 40 MG: 10 INJECTION, SOLUTION INTRAMUSCULAR; INTRAVENOUS at 10:39

## 2025-08-29 RX ADMIN — SODIUM CHLORIDE, PRESERVATIVE FREE 10 ML: 5 INJECTION INTRAVENOUS at 10:40

## 2025-08-29 RX ADMIN — FUROSEMIDE 40 MG: 10 INJECTION INTRAMUSCULAR; INTRAVENOUS at 10:39

## 2025-08-31 PROBLEM — K56.609 SBO (SMALL BOWEL OBSTRUCTION) (HCC): Status: ACTIVE | Noted: 2025-08-31

## 2025-09-02 PROBLEM — E44.0 MODERATE PROTEIN-CALORIE MALNUTRITION: Status: ACTIVE | Noted: 2025-09-02

## 2025-09-05 ENCOUNTER — ANESTHESIA EVENT (OUTPATIENT)
Dept: OPERATING ROOM | Age: 80
End: 2025-09-05
Payer: MEDICARE

## 2025-09-05 ENCOUNTER — ANESTHESIA (OUTPATIENT)
Dept: OPERATING ROOM | Age: 80
End: 2025-09-05
Payer: MEDICARE

## 2025-09-05 PROCEDURE — 6360000002 HC RX W HCPCS: Performed by: STUDENT IN AN ORGANIZED HEALTH CARE EDUCATION/TRAINING PROGRAM

## 2025-09-05 PROCEDURE — 2580000003 HC RX 258: Performed by: NURSE ANESTHETIST, CERTIFIED REGISTERED

## 2025-09-05 PROCEDURE — 2500000003 HC RX 250 WO HCPCS: Performed by: NURSE ANESTHETIST, CERTIFIED REGISTERED

## 2025-09-05 PROCEDURE — 6360000002 HC RX W HCPCS: Performed by: NURSE ANESTHETIST, CERTIFIED REGISTERED

## 2025-09-05 PROCEDURE — 2500000003 HC RX 250 WO HCPCS: Performed by: STUDENT IN AN ORGANIZED HEALTH CARE EDUCATION/TRAINING PROGRAM

## 2025-09-05 RX ORDER — ROCURONIUM BROMIDE 10 MG/ML
INJECTION, SOLUTION INTRAVENOUS
Status: DISCONTINUED | OUTPATIENT
Start: 2025-09-05 | End: 2025-09-05 | Stop reason: SDUPTHER

## 2025-09-05 RX ORDER — ONDANSETRON 2 MG/ML
INJECTION INTRAMUSCULAR; INTRAVENOUS
Status: DISCONTINUED | OUTPATIENT
Start: 2025-09-05 | End: 2025-09-05 | Stop reason: SDUPTHER

## 2025-09-05 RX ORDER — SODIUM CHLORIDE 9 MG/ML
INJECTION, SOLUTION INTRAVENOUS
Status: DISCONTINUED | OUTPATIENT
Start: 2025-09-05 | End: 2025-09-05 | Stop reason: SDUPTHER

## 2025-09-05 RX ORDER — LIDOCAINE HYDROCHLORIDE 20 MG/ML
INJECTION, SOLUTION EPIDURAL; INFILTRATION; INTRACAUDAL; PERINEURAL
Status: DISCONTINUED | OUTPATIENT
Start: 2025-09-05 | End: 2025-09-05 | Stop reason: SDUPTHER

## 2025-09-05 RX ORDER — FENTANYL CITRATE 50 UG/ML
INJECTION, SOLUTION INTRAMUSCULAR; INTRAVENOUS
Status: DISCONTINUED | OUTPATIENT
Start: 2025-09-05 | End: 2025-09-05 | Stop reason: SDUPTHER

## 2025-09-05 RX ORDER — ETOMIDATE 2 MG/ML
INJECTION INTRAVENOUS
Status: DISCONTINUED | OUTPATIENT
Start: 2025-09-05 | End: 2025-09-05 | Stop reason: SDUPTHER

## 2025-09-05 RX ORDER — DEXAMETHASONE SODIUM PHOSPHATE 4 MG/ML
INJECTION, SOLUTION INTRA-ARTICULAR; INTRALESIONAL; INTRAMUSCULAR; INTRAVENOUS; SOFT TISSUE
Status: DISCONTINUED | OUTPATIENT
Start: 2025-09-05 | End: 2025-09-05 | Stop reason: SDUPTHER

## 2025-09-05 RX ADMIN — LIDOCAINE HYDROCHLORIDE 100 MG: 20 INJECTION, SOLUTION EPIDURAL; INFILTRATION; INTRACAUDAL; PERINEURAL at 14:31

## 2025-09-05 RX ADMIN — DEXAMETHASONE SODIUM PHOSPHATE 10 MG: 4 INJECTION, SOLUTION INTRAMUSCULAR; INTRAVENOUS at 14:39

## 2025-09-05 RX ADMIN — SODIUM CHLORIDE: 9 INJECTION, SOLUTION INTRAVENOUS at 14:21

## 2025-09-05 RX ADMIN — ETOMIDATE INJECTION 20 MG: 2 SOLUTION INTRAVENOUS at 14:31

## 2025-09-05 RX ADMIN — METRONIDAZOLE 500 MG: 500 INJECTION, SOLUTION INTRAVENOUS at 14:21

## 2025-09-05 RX ADMIN — WATER 2000 MG: 1 INJECTION INTRAMUSCULAR; INTRAVENOUS; SUBCUTANEOUS at 14:21

## 2025-09-05 RX ADMIN — ROCURONIUM BROMIDE 100 MG: 10 INJECTION, SOLUTION INTRAVENOUS at 14:31

## 2025-09-05 RX ADMIN — ONDANSETRON 4 MG: 2 INJECTION, SOLUTION INTRAMUSCULAR; INTRAVENOUS at 14:39

## 2025-09-05 RX ADMIN — FENTANYL CITRATE 100 MCG: 50 INJECTION, SOLUTION INTRAMUSCULAR; INTRAVENOUS at 14:39

## 2025-09-05 RX ADMIN — PHENYLEPHRINE HYDROCHLORIDE 200 MCG: 10 INJECTION INTRAVENOUS at 14:58

## 2025-09-05 ASSESSMENT — LIFESTYLE VARIABLES: SMOKING_STATUS: 0

## (undated) DEVICE — 3M™ STERI-DRAPE™ INCISE DRAPE 1040 (35CM X 35CM): Brand: STERI-DRAPE™

## (undated) DEVICE — TOWEL,OR,DSP,ST,BLUE,STD,6/PK,12PK/CS: Brand: MEDLINE

## (undated) DEVICE — ELECTRODE 8227410 PAIRED 2 CH SET ROHS

## (undated) DEVICE — SPONGE,PEANUT,XRAY,ST,SM,3/8",5/CARD: Brand: MEDLINE INDUSTRIES, INC.

## (undated) DEVICE — SYRINGE,CONTROL,LL,FINGER,GRIP: Brand: MEDLINE INDUSTRIES, INC.

## (undated) DEVICE — 4-PORT MANIFOLD: Brand: NEPTUNE 2

## (undated) DEVICE — SKIN PREP TRAY 4 COMPARTM TRAY: Brand: MEDLINE INDUSTRIES, INC.

## (undated) DEVICE — GLOVE ORANGE PI 7 1/2   MSG9075

## (undated) DEVICE — GOWN,SIRUS,FABRNF,XL,20/CS: Brand: MEDLINE

## (undated) DEVICE — 1000 S-DRAPE TOWEL DRAPE 10/BX: Brand: STERI-DRAPE™

## (undated) DEVICE — GAUZE,SPONGE,4"X4",8PLY,STRL,LF,10/TRAY: Brand: MEDLINE

## (undated) DEVICE — HOOK RETRACT STERIL 12MM S STL BLNT E STAY LONE STAR

## (undated) DEVICE — PACK PROCEDURE SURG GEN CUST

## (undated) DEVICE — DOUBLE BASIN SET: Brand: MEDLINE INDUSTRIES, INC.

## (undated) DEVICE — BLADE,STAINLESS-STEEL,15,STRL,DISPOSABLE: Brand: MEDLINE

## (undated) DEVICE — SURGICAL PROCEDURE PACK EENT CUST

## (undated) DEVICE — DISSECTOR ENDOSCP L21CM TIP CURVATURE 40DEG FN CRV JAW VES

## (undated) DEVICE — COTTON STRIP: Brand: DEROYAL

## (undated) DEVICE — NEEDLE HYPO 25GA L1.5IN BLU POLYPR HUB S STL REG BVL STR

## (undated) DEVICE — TUBING SUCT 12FR MAL ALUM SHFT FN CAP VENT UNIV CONN W/ OBT

## (undated) DEVICE — ELECTRODE PT RET AD L9FT HI MOIST COND ADH HYDRGEL CORDED

## (undated) DEVICE — PROBE 8225101 5PK STD PRASS FL TIP ROHS

## (undated) DEVICE — COVER HNDL LT DISP

## (undated) DEVICE — GAUZE,SPONGE,4"X4",16PLY,XRAY,STRL,LF: Brand: MEDLINE

## (undated) DEVICE — BLADE ES ELASTOMERIC COAT INSUL DURABLE BEND UPTO 90DEG

## (undated) DEVICE — 3M™ TEGADERM™ TRANSPARENT FILM DRESSING FRAME STYLE, 1626W, 4 IN X 4-3/4 IN (10 CM X 12 CM), 50/CT 4CT/CASE: Brand: 3M™ TEGADERM™

## (undated) DEVICE — Device

## (undated) DEVICE — DRAIN SURG 15FR SIL RND CHN W/ TRCR FULL FLUT DBL WRP TRAD

## (undated) DEVICE — CORD,CAUTERY,BIPOLAR,STERILE: Brand: MEDLINE

## (undated) DEVICE — SYSTEM SURG HEMSTAT PWD 1 GM POLYSACCHARIDE HEMOSPHERES